# Patient Record
Sex: MALE | Race: WHITE | HISPANIC OR LATINO | Employment: OTHER | URBAN - METROPOLITAN AREA
[De-identification: names, ages, dates, MRNs, and addresses within clinical notes are randomized per-mention and may not be internally consistent; named-entity substitution may affect disease eponyms.]

---

## 2018-04-24 LAB
APTT PPP: 29 SEC (ref 23–31)
PT - I.N. RATIO (HISTORICAL): 1.1 RATIO(INR)
PT, PATIENT (HISTORICAL): 10.8 SEC (ref 9.2–11.1)

## 2018-04-30 LAB
ABSOL LYMPHOCYTES (HISTORICAL): 2.3 K/UL (ref 0.5–4)
ALBUMIN SERPL BCP-MCNC: 3.8 G/DL (ref 3–5.2)
ALP SERPL-CCNC: 46 U/L (ref 43–122)
ALT SERPL W P-5'-P-CCNC: 37 U/L (ref 9–52)
ANION GAP SERPL CALCULATED.3IONS-SCNC: 10 MMOL/L (ref 5–14)
AST SERPL W P-5'-P-CCNC: 15 U/L (ref 17–59)
BASOPHILS # BLD AUTO: 0.1 K/UL (ref 0–0.1)
BASOPHILS # BLD AUTO: 1 % (ref 0–1)
BILIRUB SERPL-MCNC: 0.4 MG/DL
BUN SERPL-MCNC: 15 MG/DL (ref 5–25)
CALCIUM SERPL-MCNC: 9.5 MG/DL (ref 8.4–10.2)
CHLORIDE SERPL-SCNC: 104 MEQ/L (ref 97–108)
CO2 SERPL-SCNC: 31 MMOL/L (ref 22–30)
COMMENT (HISTORICAL): ABNORMAL
CREATINE, SERUM (HISTORICAL): 0.83 MG/DL (ref 0.7–1.5)
DEPRECATED RDW RBC AUTO: 15.8 %
EGFR (HISTORICAL): >60 ML/MIN/1.73 M2
EOSINOPHIL # BLD AUTO: 0.3 K/UL (ref 0–0.4)
EOSINOPHIL NFR BLD AUTO: 4 % (ref 0–6)
FERRITIN SERPL-MCNC: 37 NG/ML (ref 18–464)
GLUCOSE FASTING (HISTORICAL): 85 MG/DL (ref 70–99)
HCT VFR BLD AUTO: 37.2 % (ref 41–53)
HGB BLD-MCNC: 12 G/DL (ref 13.5–17.5)
IRON SERPL-MCNC: 48 UG/DL (ref 49–181)
LYMPHOCYTES NFR BLD AUTO: 34 % (ref 25–45)
MCH RBC QN AUTO: 25.3 PG (ref 26–34)
MCHC RBC AUTO-ENTMCNC: 32.4 % (ref 31–36)
MCV RBC AUTO: 78 FL (ref 80–100)
MONOCYTES # BLD AUTO: 0.5 K/UL (ref 0.2–0.9)
MONOCYTES NFR BLD AUTO: 8 % (ref 1–10)
NEUTROPHILS ABS COUNT (HISTORICAL): 3.6 K/UL (ref 1.8–7.8)
NEUTS SEG NFR BLD AUTO: 53 % (ref 45–65)
PERCENT SATURATION (HISTORICAL): 14 % (ref 11–46)
PLATELET # BLD AUTO: 264 K/MCL (ref 150–450)
POTASSIUM SERPL-SCNC: 4.5 MEQ/L (ref 3.6–5)
RBC # BLD AUTO: 4.77 M/MCL (ref 4.5–5.9)
RBC MORPHOLOGY (HISTORICAL): ABNORMAL
SODIUM SERPL-SCNC: 144 MEQ/L (ref 137–147)
TIBC SERPL-MCNC: 332 UG/DL (ref 261–462)
TOTAL PROTEIN (HISTORICAL): 6.4 G/DL (ref 5.9–8.4)
VIT B12 SERPL-MCNC: 366 PG/ML (ref 239–931)
WBC # BLD AUTO: 6.8 K/MCL (ref 4.5–11)

## 2018-05-14 LAB
ABSOL LYMPHOCYTES (HISTORICAL): 2 K/UL (ref 0.5–4)
ALBUMIN SERPL BCP-MCNC: 4 G/DL (ref 3–5.2)
ALP SERPL-CCNC: 55 U/L (ref 43–122)
ALT SERPL W P-5'-P-CCNC: 31 U/L (ref 9–52)
ANION GAP SERPL CALCULATED.3IONS-SCNC: 9 MMOL/L (ref 5–14)
AST SERPL W P-5'-P-CCNC: 13 U/L (ref 17–59)
BASOPHILS # BLD AUTO: 0.1 K/UL (ref 0–0.1)
BASOPHILS # BLD AUTO: 1 % (ref 0–1)
BILIRUB SERPL-MCNC: 0.5 MG/DL
BUN SERPL-MCNC: 13 MG/DL (ref 5–25)
CALCIUM SERPL-MCNC: 9.2 MG/DL (ref 8.4–10.2)
CHLORIDE SERPL-SCNC: 102 MEQ/L (ref 97–108)
CO2 SERPL-SCNC: 29 MMOL/L (ref 22–30)
COMMENT (HISTORICAL): ABNORMAL
CREATINE, SERUM (HISTORICAL): 0.81 MG/DL (ref 0.7–1.5)
DEPRECATED RDW RBC AUTO: 15.6 %
EGFR (HISTORICAL): >60 ML/MIN/1.73 M2
EOSINOPHIL # BLD AUTO: 0.2 K/UL (ref 0–0.4)
EOSINOPHIL NFR BLD AUTO: 3 % (ref 0–6)
GLUCOSE SERPL-MCNC: 97 MG/DL (ref 70–99)
HCT VFR BLD AUTO: 38.7 % (ref 41–53)
HGB BLD-MCNC: 12.3 G/DL (ref 13.5–17.5)
LYMPHOCYTES NFR BLD AUTO: 35 % (ref 25–45)
MAGNESIUM SERPL-MCNC: 2.2 MG/DL (ref 1.6–2.3)
MCH RBC QN AUTO: 24.9 PG (ref 26–34)
MCHC RBC AUTO-ENTMCNC: 31.9 % (ref 31–36)
MCV RBC AUTO: 78 FL (ref 80–100)
MONOCYTES # BLD AUTO: 0.6 K/UL (ref 0.2–0.9)
MONOCYTES NFR BLD AUTO: 10 % (ref 1–10)
NEUTROPHILS ABS COUNT (HISTORICAL): 2.8 K/UL (ref 1.8–7.8)
NEUTS SEG NFR BLD AUTO: 51 % (ref 45–65)
PLATELET # BLD AUTO: 340 K/MCL (ref 150–450)
POTASSIUM SERPL-SCNC: 4.1 MEQ/L (ref 3.6–5)
RBC # BLD AUTO: 4.95 M/MCL (ref 4.5–5.9)
RBC MORPHOLOGY (HISTORICAL): ABNORMAL
SODIUM SERPL-SCNC: 140 MEQ/L (ref 137–147)
TOTAL PROTEIN (HISTORICAL): 6.5 G/DL (ref 5.9–8.4)
WBC # BLD AUTO: 5.7 K/MCL (ref 4.5–11)

## 2018-05-29 LAB
ABSOL LYMPHOCYTES (HISTORICAL): 2.2 K/UL (ref 0.5–4)
ALBUMIN SERPL BCP-MCNC: 3.6 G/DL (ref 3–5.2)
ALP SERPL-CCNC: 53 U/L (ref 43–122)
ALT SERPL W P-5'-P-CCNC: 32 U/L (ref 9–52)
ANION GAP SERPL CALCULATED.3IONS-SCNC: 10 MMOL/L (ref 5–14)
AST SERPL W P-5'-P-CCNC: 12 U/L (ref 17–59)
BASOPHILS # BLD AUTO: 0 K/UL (ref 0–0.1)
BASOPHILS # BLD AUTO: 1 % (ref 0–1)
BILIRUB SERPL-MCNC: 0.2 MG/DL
BUN SERPL-MCNC: 19 MG/DL (ref 5–25)
CALCIUM SERPL-MCNC: 8.7 MG/DL (ref 8.4–10.2)
CHLORIDE SERPL-SCNC: 108 MEQ/L (ref 97–108)
CO2 SERPL-SCNC: 24 MMOL/L (ref 22–30)
CREATINE, SERUM (HISTORICAL): 0.8 MG/DL (ref 0.7–1.5)
DEPRECATED RDW RBC AUTO: 16.1 %
EGFR (HISTORICAL): >60 ML/MIN/1.73 M2
EOSINOPHIL # BLD AUTO: 0.1 K/UL (ref 0–0.4)
EOSINOPHIL NFR BLD AUTO: 2 % (ref 0–6)
GLUCOSE SERPL-MCNC: 100 MG/DL (ref 70–99)
HCT VFR BLD AUTO: 37.8 % (ref 41–53)
HGB BLD-MCNC: 12 G/DL (ref 13.5–17.5)
LYMPHOCYTES NFR BLD AUTO: 39 % (ref 25–45)
MAGNESIUM SERPL-MCNC: 1.9 MG/DL (ref 1.6–2.3)
MCH RBC QN AUTO: 25.4 PG (ref 26–34)
MCHC RBC AUTO-ENTMCNC: 31.8 % (ref 31–36)
MCV RBC AUTO: 80 FL (ref 80–100)
MONOCYTES # BLD AUTO: 0.5 K/UL (ref 0.2–0.9)
MONOCYTES NFR BLD AUTO: 9 % (ref 1–10)
NEUTROPHILS ABS COUNT (HISTORICAL): 2.8 K/UL (ref 1.8–7.8)
NEUTS SEG NFR BLD AUTO: 49 % (ref 45–65)
PLATELET # BLD AUTO: 291 K/MCL (ref 150–450)
POTASSIUM SERPL-SCNC: 3.9 MEQ/L (ref 3.6–5)
RBC # BLD AUTO: 4.72 M/MCL (ref 4.5–5.9)
SODIUM SERPL-SCNC: 142 MEQ/L (ref 137–147)
TOTAL PROTEIN (HISTORICAL): 5.9 G/DL (ref 5.9–8.4)
WBC # BLD AUTO: 5.6 K/MCL (ref 4.5–11)

## 2018-06-11 LAB
ABSOL LYMPHOCYTES (HISTORICAL): 2.4 K/UL (ref 0.5–4)
ALBUMIN SERPL BCP-MCNC: 4.1 G/DL (ref 3–5.2)
ALP SERPL-CCNC: 62 U/L (ref 43–122)
ALT SERPL W P-5'-P-CCNC: 31 U/L (ref 9–52)
ANION GAP SERPL CALCULATED.3IONS-SCNC: 12 MMOL/L (ref 5–14)
AST SERPL W P-5'-P-CCNC: 17 U/L (ref 17–59)
BASOPHILS # BLD AUTO: 0.1 K/UL (ref 0–0.1)
BASOPHILS # BLD AUTO: 1 % (ref 0–1)
BILIRUB SERPL-MCNC: 0.3 MG/DL
BUN SERPL-MCNC: 16 MG/DL (ref 5–25)
CALCIUM SERPL-MCNC: 9.3 MG/DL (ref 8.4–10.2)
CHLORIDE SERPL-SCNC: 103 MEQ/L (ref 97–108)
CO2 SERPL-SCNC: 27 MMOL/L (ref 22–30)
COMMENT (HISTORICAL): ABNORMAL
CREATINE, SERUM (HISTORICAL): 0.86 MG/DL (ref 0.7–1.5)
DEPRECATED RDW RBC AUTO: 16.2 %
EGFR (HISTORICAL): >60 ML/MIN/1.73 M2
EOSINOPHIL # BLD AUTO: 0.1 K/UL (ref 0–0.4)
EOSINOPHIL NFR BLD AUTO: 1 % (ref 0–6)
GLUCOSE SERPL-MCNC: 101 MG/DL (ref 70–99)
HCT VFR BLD AUTO: 40.1 % (ref 41–53)
HGB BLD-MCNC: 13.1 G/DL (ref 13.5–17.5)
LYMPHOCYTES NFR BLD AUTO: 43 % (ref 25–45)
MAGNESIUM SERPL-MCNC: 2 MG/DL (ref 1.6–2.3)
MCH RBC QN AUTO: 25.6 PG (ref 26–34)
MCHC RBC AUTO-ENTMCNC: 32.5 % (ref 31–36)
MCV RBC AUTO: 79 FL (ref 80–100)
MONOCYTES # BLD AUTO: 0.6 K/UL (ref 0.2–0.9)
MONOCYTES NFR BLD AUTO: 10 % (ref 1–10)
NEUTROPHILS ABS COUNT (HISTORICAL): 2.4 K/UL (ref 1.8–7.8)
NEUTS SEG NFR BLD AUTO: 45 % (ref 45–65)
PLATELET # BLD AUTO: 295 K/MCL (ref 150–450)
POTASSIUM SERPL-SCNC: 4.5 MEQ/L (ref 3.6–5)
RBC # BLD AUTO: 5.09 M/MCL (ref 4.5–5.9)
RBC MORPHOLOGY (HISTORICAL): ABNORMAL
SODIUM SERPL-SCNC: 141 MEQ/L (ref 137–147)
TOTAL PROTEIN (HISTORICAL): 7.1 G/DL (ref 5.9–8.4)
WBC # BLD AUTO: 5.6 K/MCL (ref 4.5–11)

## 2018-06-25 ENCOUNTER — HOSPITAL ENCOUNTER (OUTPATIENT)
Dept: INFUSION CENTER | Facility: HOSPITAL | Age: 63
Discharge: HOME | End: 2018-06-25
Payer: COMMERCIAL

## 2018-06-25 PROBLEM — C19 RECTOSIGMOID JUNCTION CARCINOMA (HCC): Chronic | Status: ACTIVE | Noted: 2018-06-25

## 2018-06-25 LAB
ALBUMIN SERPL BCP-MCNC: 3.8 G/DL (ref 3–5.2)
ALP SERPL-CCNC: 55 U/L (ref 43–122)
ALT SERPL W P-5'-P-CCNC: 29 U/L (ref 9–52)
ANION GAP SERPL CALCULATED.3IONS-SCNC: 9 MMOL/L (ref 5–14)
AST SERPL W P-5'-P-CCNC: 24 U/L (ref 17–59)
BASOPHILS # BLD AUTO: 0 THOUSANDS/ΜL (ref 0–0.1)
BASOPHILS NFR BLD AUTO: 1 % (ref 0–1)
BILIRUB SERPL-MCNC: 0.4 MG/DL
BUN SERPL-MCNC: 13 MG/DL (ref 5–25)
CALCIUM SERPL-MCNC: 8.7 MG/DL (ref 8.4–10.2)
CHLORIDE SERPL-SCNC: 106 MMOL/L (ref 97–108)
CO2 SERPL-SCNC: 27 MMOL/L (ref 22–30)
CREAT SERPL-MCNC: 0.79 MG/DL (ref 0.7–1.5)
EOSINOPHIL # BLD AUTO: 0.1 THOUSAND/ΜL (ref 0–0.4)
EOSINOPHIL NFR BLD AUTO: 2 % (ref 0–6)
ERYTHROCYTE [DISTWIDTH] IN BLOOD BY AUTOMATED COUNT: 16.3 %
GFR SERPL CREATININE-BSD FRML MDRD: 111 ML/MIN/1.73SQ M
GLUCOSE SERPL-MCNC: 139 MG/DL (ref 70–99)
HCT VFR BLD AUTO: 38.9 % (ref 41–53)
HGB BLD-MCNC: 12.4 G/DL (ref 13.5–17.5)
LYMPHOCYTES # BLD AUTO: 1.7 THOUSANDS/ΜL (ref 0.5–4)
LYMPHOCYTES NFR BLD AUTO: 39 % (ref 20–50)
MAGNESIUM SERPL-MCNC: 2.1 MG/DL (ref 1.6–2.3)
MCH RBC QN AUTO: 25.3 PG (ref 26–34)
MCHC RBC AUTO-ENTMCNC: 31.9 G/DL (ref 31–36)
MCV RBC AUTO: 79 FL (ref 80–100)
MONOCYTES # BLD AUTO: 0.4 THOUSAND/ΜL (ref 0.2–0.9)
MONOCYTES NFR BLD AUTO: 9 % (ref 1–10)
NEUTROPHILS # BLD AUTO: 2.1 THOUSANDS/ΜL (ref 1.8–7.8)
NEUTS SEG NFR BLD AUTO: 49 % (ref 45–65)
PLATELET # BLD AUTO: 244 THOUSANDS/UL (ref 150–450)
PLATELET BLD QL SMEAR: ADEQUATE
PMV BLD AUTO: 7.7 FL (ref 8.9–12.7)
POTASSIUM SERPL-SCNC: 3.8 MMOL/L (ref 3.6–5)
PROT SERPL-MCNC: 6.7 G/DL (ref 5.9–8.4)
RBC # BLD AUTO: 4.9 MILLION/UL (ref 4.5–5.9)
RBC MORPH BLD: NORMAL
SODIUM SERPL-SCNC: 142 MMOL/L (ref 137–147)
WBC # BLD AUTO: 4.2 THOUSAND/UL (ref 4.5–11)

## 2018-06-25 PROCEDURE — 80053 COMPREHEN METABOLIC PANEL: CPT | Performed by: INTERNAL MEDICINE

## 2018-06-25 PROCEDURE — 83735 ASSAY OF MAGNESIUM: CPT | Performed by: INTERNAL MEDICINE

## 2018-06-25 PROCEDURE — 85025 COMPLETE CBC W/AUTO DIFF WBC: CPT | Performed by: INTERNAL MEDICINE

## 2018-06-25 NOTE — PLAN OF CARE
Problem: INFECTION - ADULT  Goal: Absence or prevention of progression during hospitalization  INTERVENTIONS:  - Assess and monitor for signs and symptoms of infection  - Monitor lab/diagnostic results  - Monitor all insertion sites, i e  indwelling lines, tubes, and drains  - Monitor endotracheal (as able) and nasal secretions for changes in amount and color  - [place] appropriate cooling/warming therapies per order  - Administer medications as ordered  - Instruct and encourage patient and family to use good hand hygiene technique  - Identify and instruct in appropriate isolation precautions for identified infection/condition  Outcome: Progressing    Goal: Absence of fever/infection during neutropenic period  INTERVENTIONS:  - Monitor WBC  - Implement neutropenic guidelines  Outcome: Progressing      Problem: Knowledge Deficit  Goal: Patient/family/caregiver demonstrates understanding of disease process, treatment plan, medications, and discharge instructions  Complete learning assessment and assess knowledge base    Interventions:  - Provide teaching at level of understanding  - Provide teaching via preferred learning methods  Outcome: Progressing

## 2018-06-25 NOTE — PROGRESS NOTES
Pt arrived for labs  Port accessed without difficulties aND LABS DRAWN  PTS ENGLISH IS LIMITED  WILL DEFER CLINICAL INTAKE UNTIL TOMORROW PRIOR TO THERAPY

## 2018-06-26 ENCOUNTER — OFFICE VISIT (OUTPATIENT)
Dept: HEMATOLOGY ONCOLOGY | Facility: CLINIC | Age: 63
End: 2018-06-26
Payer: COMMERCIAL

## 2018-06-26 ENCOUNTER — HOSPITAL ENCOUNTER (OUTPATIENT)
Dept: INFUSION CENTER | Facility: HOSPITAL | Age: 63
Discharge: HOME | End: 2018-06-26
Payer: COMMERCIAL

## 2018-06-26 VITALS
HEIGHT: 68 IN | HEART RATE: 95 BPM | WEIGHT: 171.2 LBS | TEMPERATURE: 97.6 F | SYSTOLIC BLOOD PRESSURE: 150 MMHG | DIASTOLIC BLOOD PRESSURE: 90 MMHG | BODY MASS INDEX: 25.94 KG/M2

## 2018-06-26 VITALS
TEMPERATURE: 98.3 F | BODY MASS INDEX: 26.68 KG/M2 | SYSTOLIC BLOOD PRESSURE: 128 MMHG | HEIGHT: 67 IN | HEART RATE: 68 BPM | DIASTOLIC BLOOD PRESSURE: 78 MMHG | RESPIRATION RATE: 16 BRPM | WEIGHT: 170 LBS | OXYGEN SATURATION: 99 %

## 2018-06-26 DIAGNOSIS — C18.9 MALIGNANT NEOPLASM OF COLON, UNSPECIFIED PART OF COLON (HCC): Primary | ICD-10-CM

## 2018-06-26 PROCEDURE — 96415 CHEMO IV INFUSION ADDL HR: CPT

## 2018-06-26 PROCEDURE — 96367 TX/PROPH/DG ADDL SEQ IV INF: CPT

## 2018-06-26 PROCEDURE — 96416 CHEMO PROLONG INFUSE W/PUMP: CPT

## 2018-06-26 PROCEDURE — 96372 THER/PROPH/DIAG INJ SC/IM: CPT

## 2018-06-26 PROCEDURE — 96368 THER/DIAG CONCURRENT INF: CPT

## 2018-06-26 PROCEDURE — 99214 OFFICE O/P EST MOD 30 MIN: CPT | Performed by: INTERNAL MEDICINE

## 2018-06-26 PROCEDURE — 96411 CHEMO IV PUSH ADDL DRUG: CPT

## 2018-06-26 PROCEDURE — 96413 CHEMO IV INFUSION 1 HR: CPT

## 2018-06-26 PROCEDURE — G0498 CHEMO EXTEND IV INFUS W/PUMP: HCPCS

## 2018-06-26 RX ORDER — CYANOCOBALAMIN 1000 UG/ML
1000 INJECTION INTRAMUSCULAR; SUBCUTANEOUS
Status: DISCONTINUED | OUTPATIENT
Start: 2018-06-26 | End: 2018-06-26

## 2018-06-26 RX ORDER — PANTOPRAZOLE SODIUM 40 MG/1
TABLET, DELAYED RELEASE ORAL
Refills: 3 | COMMUNITY
Start: 2018-05-28 | End: 2018-09-10 | Stop reason: SDUPTHER

## 2018-06-26 RX ORDER — CYANOCOBALAMIN 1000 UG/ML
INJECTION INTRAMUSCULAR; SUBCUTANEOUS
Status: DISPENSED
Start: 2018-06-26 | End: 2018-06-27

## 2018-06-26 RX ORDER — FLUOROURACIL 50 MG/ML
768 INJECTION, SOLUTION INTRAVENOUS ONCE
Status: COMPLETED | OUTPATIENT
Start: 2018-06-26 | End: 2018-06-26

## 2018-06-26 RX ORDER — ARIPIPRAZOLE 20 MG/1
TABLET ORAL
Refills: 2 | COMMUNITY
Start: 2018-06-15

## 2018-06-26 RX ORDER — BENZTROPINE MESYLATE 0.5 MG/1
TABLET ORAL EVERY 24 HOURS
COMMUNITY

## 2018-06-26 RX ORDER — CLONAZEPAM 2 MG/1
TABLET ORAL
Refills: 2 | COMMUNITY
Start: 2018-04-21

## 2018-06-26 RX ORDER — ATORVASTATIN CALCIUM 20 MG/1
20 TABLET, FILM COATED ORAL
COMMUNITY
End: 2018-08-31

## 2018-06-26 RX ORDER — LIDOCAINE AND PRILOCAINE 25; 25 MG/G; MG/G
CREAM TOPICAL
Refills: 3 | COMMUNITY
Start: 2018-04-26

## 2018-06-26 RX ORDER — HEPARIN SODIUM (PORCINE) LOCK FLUSH IV SOLN 100 UNIT/ML 100 UNIT/ML
500 SOLUTION INTRAVENOUS AS NEEDED
Status: DISCONTINUED | OUTPATIENT
Start: 2018-06-26 | End: 2018-06-30 | Stop reason: HOSPADM

## 2018-06-26 RX ORDER — CYANOCOBALAMIN 1000 UG/ML
1000 INJECTION INTRAMUSCULAR; SUBCUTANEOUS ONCE
Status: COMPLETED | OUTPATIENT
Start: 2018-06-26 | End: 2018-06-26

## 2018-06-26 RX ORDER — ASPIRIN 81 MG/1
TABLET ORAL
Refills: 4 | COMMUNITY
Start: 2018-05-25 | End: 2019-03-21 | Stop reason: SDUPTHER

## 2018-06-26 RX ORDER — POLYVINYL ALCOHOL 14 MG/ML
SOLUTION/ DROPS OPHTHALMIC
Refills: 0 | COMMUNITY
Start: 2018-05-18 | End: 2021-03-23 | Stop reason: SDUPTHER

## 2018-06-26 RX ORDER — LISINOPRIL 40 MG/1
TABLET ORAL
Refills: 2 | COMMUNITY
Start: 2018-05-28 | End: 2018-07-23 | Stop reason: SDUPTHER

## 2018-06-26 RX ORDER — ONDANSETRON HYDROCHLORIDE 8 MG/1
TABLET, FILM COATED ORAL
Refills: 3 | COMMUNITY
Start: 2018-04-26 | End: 2018-08-06 | Stop reason: SDUPTHER

## 2018-06-26 RX ORDER — HYDROCHLOROTHIAZIDE 12.5 MG/1
12.5 CAPSULE, GELATIN COATED ORAL
COMMUNITY
End: 2018-07-23 | Stop reason: SDUPTHER

## 2018-06-26 RX ADMIN — OXALIPLATIN 163 MG: 5 INJECTION, SOLUTION INTRAVENOUS at 12:17

## 2018-06-26 RX ADMIN — LEUCOVORIN CALCIUM 768 MG: 350 INJECTION, POWDER, LYOPHILIZED, FOR SOLUTION INTRAMUSCULAR; INTRAVENOUS at 12:16

## 2018-06-26 RX ADMIN — FLUOROURACIL 768 MG: 50 INJECTION, SOLUTION INTRAVENOUS at 14:28

## 2018-06-26 RX ADMIN — ONDANSETRON HYDROCHLORIDE: 2 INJECTION, SOLUTION INTRAMUSCULAR; INTRAVENOUS at 11:30

## 2018-06-26 RX ADMIN — SODIUM CHLORIDE 150 MG: 9 INJECTION, SOLUTION INTRAVENOUS at 10:54

## 2018-06-26 RX ADMIN — CYANOCOBALAMIN 1000 MCG: 1000 INJECTION INTRAMUSCULAR; SUBCUTANEOUS at 14:39

## 2018-06-26 NOTE — PLAN OF CARE

## 2018-06-26 NOTE — PLAN OF CARE
Problem: INFECTION - ADULT  Goal: Absence of fever/infection during neutropenic period  INTERVENTIONS:  - Monitor WBC  - Implement neutropenic guidelines  Outcome: Progressing      Problem: Knowledge Deficit  Goal: Patient/family/caregiver demonstrates understanding of disease process, treatment plan, medications, and discharge instructions  Complete learning assessment and assess knowledge base    Interventions:  - Provide teaching at level of understanding  - Provide teaching via preferred learning methods  Outcome: Progressing

## 2018-06-26 NOTE — PROGRESS NOTES
Hematology/Oncology Outpatient Follow-up  Zachary Thayer 58 y o  male 1955 [id number]    Date:  6/26/2018      Assessment and Plan:  1  Stage IIIC adenocarcinoma of the colon  We will continue with the adjuvant chemotherapy FOLFOX6 on every other week basis  He is due for cycle 5 of 12 cycles  2   Mild microcytic anemia without iron deficiency  We will continue to monitor his hemoglobin level and treat him with iron IV if necessary  HPI:    Mr lloyd is tolerating the adjuvant chemotherapy very well  He only complains about mild neuropathy and mild difficulties of swallowing for 3 days after chemotherapy  Oncology History    Mr Wendie Xiao was diagnosed with rectosigmoid adenocarcinoma in March of 2018  He was in his usual state of health until he started to notice blood per rectum with each bowel movement for approximately 2 months  He had a colonoscopy February 27th 2018 which revealed a rectosigmoid junction mass  Biopsy revealed moderately differentiated adenocarcinoma  Staging CT of the chest abdomen and pelvis on March 5, 2018 showed no significant hand of any metastatic process in the liver  He did have 3-5 mm round ground-glass opacities within the periphery periphery of the right upper lobe of the lung, 3-6 month follow-up was recommended  CT also revealed focal narrowing of the mid sigmoid colon with associated wall thickening and mild prostatic hypertrophy and posterior bladder diverticuli  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[address] )    6/25/2018 Initial Diagnosis     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[address] )        Chemotherapy     1  Started FOLFOX6 cycle #1 May 1, 2018  Surgery     1  March 28, 2018 low anterior resection of the rectosigmoid sigmoid junction mass by Dr Marcia Hwang  Stage IIIc (T4, N2a, M0)            Interval history:    ROS: Review of Systems   Constitutional: Negative for appetite change, diaphoresis, fatigue and fever     HENT: Negative for congestion, dental problem, facial swelling, hearing loss, tinnitus and trouble swallowing  Eyes: Negative for visual disturbance  Respiratory: Negative for cough, chest tightness, shortness of breath and wheezing  Cardiovascular: Negative for chest pain and leg swelling  Gastrointestinal: Negative for abdominal distention, abdominal pain, blood in stool, constipation, diarrhea, nausea and vomiting  Genitourinary: Negative for dysuria, hematuria and urgency  Musculoskeletal: Negative for arthralgias, myalgias and neck pain  Skin: Negative  Negative for color change, pallor, rash and wound  Neurological: Positive for numbness (mild)  Negative for dizziness, weakness and headaches  Hematological: Negative for adenopathy  Psychiatric/Behavioral: Negative for agitation, behavioral problems, confusion, hallucinations, self-injury and sleep disturbance  The patient is not nervous/anxious and is not hyperactive  Past Medical History:   Diagnosis Date    [facility]     Depression     Hypertension        History reviewed  No pertinent surgical history      Social History     Social History    Marital status: Single     Spouse name: N/A    Number of children: N/A    Years of education: N/A     Social History Main Topics    Smoking status: Former Smoker    Smokeless tobacco: Never Used      Comment: quit 20 years ago     Alcohol use 0 6 oz/week     1 Cans of beer per week    Drug use: No    Sexual activity: Not Asked     Other Topics Concern    None     Social History Narrative    None       Family History   Problem Relation Age of Onset    Family history unknown: Yes       No Known Allergies      Current Outpatient Prescriptions:     ARIPiprazole (ABILIFY) 20 MG tablet, TK 1 T PO QD , Disp: , Rfl: 2    ARTIFICIAL TEARS 1 4 % ophthalmic solution, USE 1 DROP IN BOTH EYES 3-4 TIMES PER DAY AS NEEDED, Disp: , Rfl: 0    aspirin (ECOTRIN LOW STRENGTH) 81 mg EC tablet, TK 1 T PO QD, Disp: , Rfl: 4    atorvastatin (LIPITOR) 20 mg tablet, Take 20 mg by mouth, Disp: , Rfl:     benztropine (COGENTIN) 0 5 mg tablet, every 24 hours, Disp: , Rfl:     clonazePAM (KlonoPIN) 2 mg tablet, TK 1 T PO HS PRN , Disp: , Rfl: 2    enoxaparin (LOVENOX) 40 mg/0 4 mL, INJECT 0 4 ML UNDER THE SKIN DAILY FOR 21 DAYS, Disp: , Rfl: 0    hydrochlorothiazide (MICROZIDE) 12 5 mg capsule, Take 12 5 mg by mouth, Disp: , Rfl:     lidocaine-prilocaine (EMLA) cream, , Disp: , Rfl: 3    lisinopril (ZESTRIL) 40 mg tablet, TK 1 T PO QD, Disp: , Rfl: 2    ondansetron (ZOFRAN) 8 mg tablet, TK 1 T PO  Q 8 H PRF NAUSEA AND VOMITING, Disp: , Rfl: 3    pantoprazole (PROTONIX) 40 mg tablet, TK 1 T PO D, Disp: , Rfl: 3    Propylene Glycol-Glycerin 1-0 3 % SOLN, Use 3-4 times per day PRN, Disp: , Rfl:       Physical Exam:  /90 (BP Location: Left arm, Patient Position: Sitting, Cuff Size: Adult)   Pulse 95   Temp 97 6 °F (36 4 °C) (Tympanic)   Ht 5' 8" (1 727 m)   Wt 77 7 kg (171 lb 3 2 oz)   BMI 26 03 kg/m²     Physical Exam   Constitutional: He is oriented to person, place, and time  He appears well-developed and well-nourished  HENT:   Head: Normocephalic and atraumatic  Nose: Nose normal    Mouth/Throat: Oropharynx is clear and moist    Eyes: Conjunctivae and EOM are normal  Pupils are equal, round, and reactive to light  Right eye exhibits no discharge  Left eye exhibits no discharge  No scleral icterus  Neck: Normal range of motion  Neck supple  No tracheal deviation present  No thyromegaly present  Cardiovascular: Normal rate, regular rhythm and normal heart sounds  Exam reveals no friction rub  No murmur heard  Pulmonary/Chest: Effort normal and breath sounds normal  No respiratory distress  He has no wheezes  He has no rales  He exhibits no tenderness  Abdominal: Soft  Bowel sounds are normal  He exhibits no distension and no mass  There is no hepatosplenomegaly, splenomegaly or hepatomegaly   There is no tenderness  There is no rebound and no guarding  Musculoskeletal: Normal range of motion  He exhibits no edema, tenderness or deformity  Lymphadenopathy:     He has no cervical adenopathy  Neurological: He is alert and oriented to person, place, and time  He has normal reflexes  No cranial nerve deficit  Coordination normal    Skin: Skin is warm and dry  No rash noted  No erythema  No pallor  Psychiatric: He has a normal mood and affect  His behavior is normal  Judgment and thought content normal          Labs:  Lab Results   Component Value Date    WBC 4 20 (L) 06/25/2018    HGB 12 4 (L) 06/25/2018    HCT 38 9 (L) 06/25/2018    MCV 79 (L) 06/25/2018     06/25/2018     Lab Results   Component Value Date     06/25/2018    K 3 8 06/25/2018     06/25/2018    CO2 27 06/25/2018    ANIONGAP 9 06/25/2018    BUN 13 06/25/2018    CREATININE 0 79 06/25/2018    GLUCOSE 139 (H) 06/25/2018    GLUF 85 04/30/2018    CALCIUM 8 7 06/25/2018    AST 24 06/25/2018    ALT 29 06/25/2018    ALKPHOS 55 06/25/2018    PROT 6 7 06/25/2018    BILITOT 0 40 06/25/2018    EGFR 111 06/25/2018     @RESULTFAST(TSH)@    Patient voiced understanding and agreement in the above discussion  Aware to contact our office with questions/symptoms in the interim

## 2018-06-28 ENCOUNTER — HOSPITAL ENCOUNTER (OUTPATIENT)
Dept: INFUSION CENTER | Facility: HOSPITAL | Age: 63
Discharge: HOME | End: 2018-06-28
Payer: COMMERCIAL

## 2018-06-28 RX ADMIN — Medication 500 UNITS: at 12:58

## 2018-06-28 NOTE — PROGRESS NOTES
Pt arrived for CADD d/c  Res vol=0  Good blood return noted, flushed per protocol   Offers no complaints

## 2018-07-09 ENCOUNTER — HOSPITAL ENCOUNTER (OUTPATIENT)
Dept: INFUSION CENTER | Facility: HOSPITAL | Age: 63
Discharge: HOME | End: 2018-07-09
Payer: COMMERCIAL

## 2018-07-09 ENCOUNTER — OFFICE VISIT (OUTPATIENT)
Dept: HEMATOLOGY ONCOLOGY | Facility: CLINIC | Age: 63
End: 2018-07-09
Payer: COMMERCIAL

## 2018-07-09 VITALS
HEART RATE: 74 BPM | BODY MASS INDEX: 25.67 KG/M2 | SYSTOLIC BLOOD PRESSURE: 138 MMHG | HEIGHT: 68 IN | RESPIRATION RATE: 18 BRPM | DIASTOLIC BLOOD PRESSURE: 88 MMHG | TEMPERATURE: 97.1 F | OXYGEN SATURATION: 99 % | WEIGHT: 169.4 LBS

## 2018-07-09 DIAGNOSIS — D50.9 IRON DEFICIENCY ANEMIA, UNSPECIFIED IRON DEFICIENCY ANEMIA TYPE: ICD-10-CM

## 2018-07-09 DIAGNOSIS — C19 RECTOSIGMOID JUNCTION CARCINOMA (HCC): ICD-10-CM

## 2018-07-09 DIAGNOSIS — C19 RECTOSIGMOID JUNCTION CARCINOMA (HCC): Primary | Chronic | ICD-10-CM

## 2018-07-09 LAB
ALBUMIN SERPL BCP-MCNC: 3.9 G/DL (ref 3–5.2)
ALP SERPL-CCNC: 68 U/L (ref 43–122)
ALT SERPL W P-5'-P-CCNC: 36 U/L (ref 9–52)
ANION GAP SERPL CALCULATED.3IONS-SCNC: 7 MMOL/L (ref 5–14)
AST SERPL W P-5'-P-CCNC: 29 U/L (ref 17–59)
BASOPHILS # BLD AUTO: 0 THOUSANDS/ΜL (ref 0–0.1)
BASOPHILS NFR BLD AUTO: 1 % (ref 0–1)
BILIRUB SERPL-MCNC: 0.4 MG/DL
BUN SERPL-MCNC: 12 MG/DL (ref 5–25)
CALCIUM SERPL-MCNC: 8.9 MG/DL (ref 8.4–10.2)
CHLORIDE SERPL-SCNC: 104 MMOL/L (ref 97–108)
CO2 SERPL-SCNC: 27 MMOL/L (ref 22–30)
CREAT SERPL-MCNC: 0.79 MG/DL (ref 0.7–1.5)
EOSINOPHIL # BLD AUTO: 0.1 THOUSAND/ΜL (ref 0–0.4)
EOSINOPHIL NFR BLD AUTO: 1 % (ref 0–6)
ERYTHROCYTE [DISTWIDTH] IN BLOOD BY AUTOMATED COUNT: 18 %
GFR SERPL CREATININE-BSD FRML MDRD: 111 ML/MIN/1.73SQ M
GLUCOSE SERPL-MCNC: 106 MG/DL (ref 70–99)
HCT VFR BLD AUTO: 39.4 % (ref 41–53)
HGB BLD-MCNC: 12.9 G/DL (ref 13.5–17.5)
HYPERCHROMIA BLD QL SMEAR: PRESENT
LYMPHOCYTES # BLD AUTO: 1.6 THOUSANDS/ΜL (ref 0.5–4)
LYMPHOCYTES NFR BLD AUTO: 32 % (ref 20–50)
MAGNESIUM SERPL-MCNC: 2.2 MG/DL (ref 1.6–2.3)
MCH RBC QN AUTO: 26 PG (ref 26–34)
MCHC RBC AUTO-ENTMCNC: 32.8 G/DL (ref 31–36)
MCV RBC AUTO: 79 FL (ref 80–100)
MONOCYTES # BLD AUTO: 0.5 THOUSAND/ΜL (ref 0.2–0.9)
MONOCYTES NFR BLD AUTO: 10 % (ref 1–10)
NEUTROPHILS # BLD AUTO: 2.8 THOUSANDS/ΜL (ref 1.8–7.8)
NEUTS SEG NFR BLD AUTO: 56 % (ref 45–65)
PLATELET # BLD AUTO: 278 THOUSANDS/UL (ref 150–450)
PLATELET BLD QL SMEAR: ADEQUATE
PMV BLD AUTO: 7.8 FL (ref 8.9–12.7)
POTASSIUM SERPL-SCNC: 4 MMOL/L (ref 3.6–5)
PROT SERPL-MCNC: 7 G/DL (ref 5.9–8.4)
RBC # BLD AUTO: 4.97 MILLION/UL (ref 4.5–5.9)
RBC MORPH BLD: PRESENT
SODIUM SERPL-SCNC: 138 MMOL/L (ref 137–147)
WBC # BLD AUTO: 5.1 THOUSAND/UL (ref 4.5–11)

## 2018-07-09 PROCEDURE — 80053 COMPREHEN METABOLIC PANEL: CPT

## 2018-07-09 PROCEDURE — 85025 COMPLETE CBC W/AUTO DIFF WBC: CPT

## 2018-07-09 PROCEDURE — 99214 OFFICE O/P EST MOD 30 MIN: CPT | Performed by: INTERNAL MEDICINE

## 2018-07-09 PROCEDURE — 83735 ASSAY OF MAGNESIUM: CPT | Performed by: INTERNAL MEDICINE

## 2018-07-09 RX ORDER — HEPARIN SODIUM (PORCINE) LOCK FLUSH IV SOLN 100 UNIT/ML 100 UNIT/ML
300 SOLUTION INTRAVENOUS AS NEEDED
Status: DISCONTINUED | OUTPATIENT
Start: 2018-07-10 | End: 2018-07-14 | Stop reason: HOSPADM

## 2018-07-09 RX ORDER — FLUOROURACIL 50 MG/ML
768 INJECTION, SOLUTION INTRAVENOUS ONCE
Status: COMPLETED | OUTPATIENT
Start: 2018-07-10 | End: 2018-07-10

## 2018-07-09 RX ORDER — SODIUM CHLORIDE 9 MG/ML
20 INJECTION, SOLUTION INTRAVENOUS CONTINUOUS
Status: DISCONTINUED | OUTPATIENT
Start: 2018-07-10 | End: 2018-07-14 | Stop reason: HOSPADM

## 2018-07-09 NOTE — PROGRESS NOTES
Admitted to infusion center today for lab draw via port  Pac accessed per protocol  Good blood return noted  Flushed and deaccessed per routine

## 2018-07-10 ENCOUNTER — HOSPITAL ENCOUNTER (OUTPATIENT)
Dept: INFUSION CENTER | Facility: HOSPITAL | Age: 63
Discharge: HOME | End: 2018-07-10
Payer: COMMERCIAL

## 2018-07-10 VITALS
SYSTOLIC BLOOD PRESSURE: 128 MMHG | RESPIRATION RATE: 16 BRPM | TEMPERATURE: 98.4 F | DIASTOLIC BLOOD PRESSURE: 74 MMHG | HEART RATE: 93 BPM | BODY MASS INDEX: 26.89 KG/M2 | HEIGHT: 67 IN | WEIGHT: 171.3 LBS

## 2018-07-10 DIAGNOSIS — D50.9 IRON DEFICIENCY ANEMIA, UNSPECIFIED IRON DEFICIENCY ANEMIA TYPE: ICD-10-CM

## 2018-07-10 LAB
FERRITIN SERPL-MCNC: 74 NG/ML (ref 8–388)
IRON SATN MFR SERPL: 24 %
IRON SERPL-MCNC: 98 UG/DL (ref 65–175)
TIBC SERPL-MCNC: 405 UG/DL (ref 250–450)
VIT B12 SERPL-MCNC: 619 PG/ML (ref 100–900)

## 2018-07-10 PROCEDURE — 82607 VITAMIN B-12: CPT

## 2018-07-10 PROCEDURE — 83550 IRON BINDING TEST: CPT

## 2018-07-10 PROCEDURE — 96415 CHEMO IV INFUSION ADDL HR: CPT

## 2018-07-10 PROCEDURE — 96413 CHEMO IV INFUSION 1 HR: CPT

## 2018-07-10 PROCEDURE — 96411 CHEMO IV PUSH ADDL DRUG: CPT

## 2018-07-10 PROCEDURE — 96367 TX/PROPH/DG ADDL SEQ IV INF: CPT

## 2018-07-10 PROCEDURE — 96368 THER/DIAG CONCURRENT INF: CPT

## 2018-07-10 PROCEDURE — 83540 ASSAY OF IRON: CPT

## 2018-07-10 PROCEDURE — G0498 CHEMO EXTEND IV INFUS W/PUMP: HCPCS

## 2018-07-10 PROCEDURE — 82728 ASSAY OF FERRITIN: CPT

## 2018-07-10 RX ORDER — DEXTROSE MONOHYDRATE 50 MG/ML
20 INJECTION, SOLUTION INTRAVENOUS CONTINUOUS
Status: DISCONTINUED | OUTPATIENT
Start: 2018-07-10 | End: 2018-07-14 | Stop reason: HOSPADM

## 2018-07-10 RX ADMIN — SODIUM CHLORIDE 20 ML/HR: 9 INJECTION, SOLUTION INTRAVENOUS at 10:33

## 2018-07-10 RX ADMIN — LEUCOVORIN CALCIUM 768 MG: 350 INJECTION, POWDER, LYOPHILIZED, FOR SOLUTION INTRAMUSCULAR; INTRAVENOUS at 12:19

## 2018-07-10 RX ADMIN — OXALIPLATIN 163 MG: 5 INJECTION, SOLUTION INTRAVENOUS at 12:25

## 2018-07-10 RX ADMIN — SODIUM CHLORIDE 150 MG: 9 INJECTION, SOLUTION INTRAVENOUS at 11:40

## 2018-07-10 RX ADMIN — DEXTROSE MONOHYDRATE 20 ML/HR: 50 INJECTION, SOLUTION INTRAVENOUS at 12:16

## 2018-07-10 RX ADMIN — FLUOROURACIL 768 MG: 50 INJECTION, SOLUTION INTRAVENOUS at 14:50

## 2018-07-10 RX ADMIN — ONDANSETRON HYDROCHLORIDE: 2 INJECTION, SOLUTION INTRAMUSCULAR; INTRAVENOUS at 11:14

## 2018-07-10 NOTE — PROGRESS NOTES
Patient offers no complaints today  Tolerated chemo well without complications  CADD pump connected at end and patient aware to return [address] 7-12-18 at 1300  AVS provided in [place] and [facility] [place]

## 2018-07-10 NOTE — PLAN OF CARE
Problem: Potential for Falls  Goal: Patient will remain free of falls  INTERVENTIONS:  - Assess patient frequently for physical needs  -  Identify cognitive and physical deficits and behaviors that affect risk of falls    -  [place] fall precautions as indicated by assessment   - Educate patient/family on patient safety including physical limitations  - Instruct patient to call for assistance with activity based on assessment  - Modify environment to reduce risk of injury  - Consider OT/PT consult to assist with strengthening/mobility   Outcome: Progressing

## 2018-07-11 RX ORDER — HEPARIN SODIUM (PORCINE) LOCK FLUSH IV SOLN 100 UNIT/ML 100 UNIT/ML
300 SOLUTION INTRAVENOUS AS NEEDED
Status: DISCONTINUED | OUTPATIENT
Start: 2018-07-12 | End: 2018-07-16 | Stop reason: HOSPADM

## 2018-07-11 RX ORDER — SODIUM CHLORIDE 9 MG/ML
20 INJECTION, SOLUTION INTRAVENOUS CONTINUOUS
Status: DISCONTINUED | OUTPATIENT
Start: 2018-07-12 | End: 2018-07-16 | Stop reason: HOSPADM

## 2018-07-12 ENCOUNTER — HOSPITAL ENCOUNTER (OUTPATIENT)
Dept: INFUSION CENTER | Facility: HOSPITAL | Age: 63
Discharge: HOME | End: 2018-07-12
Payer: COMMERCIAL

## 2018-07-12 RX ADMIN — Medication 300 UNITS: at 13:09

## 2018-07-18 DIAGNOSIS — C19 RECTOSIGMOID JUNCTION CARCINOMA (HCC): Primary | Chronic | ICD-10-CM

## 2018-07-23 ENCOUNTER — OFFICE VISIT (OUTPATIENT)
Dept: HEMATOLOGY ONCOLOGY | Facility: CLINIC | Age: 63
End: 2018-07-23
Payer: COMMERCIAL

## 2018-07-23 ENCOUNTER — HOSPITAL ENCOUNTER (OUTPATIENT)
Dept: INFUSION CENTER | Facility: HOSPITAL | Age: 63
Discharge: HOME | End: 2018-07-23
Payer: COMMERCIAL

## 2018-07-23 ENCOUNTER — TELEPHONE (OUTPATIENT)
Dept: FAMILY MEDICINE CLINIC | Facility: CLINIC | Age: 63
End: 2018-07-23

## 2018-07-23 VITALS
WEIGHT: 169.4 LBS | DIASTOLIC BLOOD PRESSURE: 84 MMHG | OXYGEN SATURATION: 98 % | HEART RATE: 79 BPM | BODY MASS INDEX: 26.59 KG/M2 | RESPIRATION RATE: 17 BRPM | SYSTOLIC BLOOD PRESSURE: 148 MMHG | TEMPERATURE: 97.5 F | HEIGHT: 67 IN

## 2018-07-23 DIAGNOSIS — E78.5 HYPERLIPIDEMIA, UNSPECIFIED HYPERLIPIDEMIA TYPE: ICD-10-CM

## 2018-07-23 DIAGNOSIS — D50.9 IRON DEFICIENCY ANEMIA, UNSPECIFIED IRON DEFICIENCY ANEMIA TYPE: ICD-10-CM

## 2018-07-23 DIAGNOSIS — C19 RECTOSIGMOID JUNCTION CARCINOMA (HCC): Primary | Chronic | ICD-10-CM

## 2018-07-23 DIAGNOSIS — I10 HYPERTENSION, UNSPECIFIED TYPE: ICD-10-CM

## 2018-07-23 DIAGNOSIS — C19 RECTOSIGMOID JUNCTION CARCINOMA (HCC): ICD-10-CM

## 2018-07-23 DIAGNOSIS — I15.9 SECONDARY HYPERTENSION: Primary | ICD-10-CM

## 2018-07-23 DIAGNOSIS — D51.3 OTHER DIETARY VITAMIN B12 DEFICIENCY ANEMIA: ICD-10-CM

## 2018-07-23 LAB
ALBUMIN SERPL BCP-MCNC: 3.7 G/DL (ref 3–5.2)
ALP SERPL-CCNC: 60 U/L (ref 43–122)
ALT SERPL W P-5'-P-CCNC: 75 U/L (ref 9–52)
ANION GAP SERPL CALCULATED.3IONS-SCNC: 6 MMOL/L (ref 5–14)
ANISOCYTOSIS BLD QL SMEAR: PRESENT
AST SERPL W P-5'-P-CCNC: 41 U/L (ref 17–59)
BILIRUB SERPL-MCNC: 0.3 MG/DL
BUN SERPL-MCNC: 10 MG/DL (ref 5–25)
CALCIUM SERPL-MCNC: 8.8 MG/DL (ref 8.4–10.2)
CHLORIDE SERPL-SCNC: 105 MMOL/L (ref 97–108)
CO2 SERPL-SCNC: 30 MMOL/L (ref 22–30)
CREAT SERPL-MCNC: 0.76 MG/DL (ref 0.7–1.5)
EOSINOPHIL # BLD AUTO: 0.09 THOUSAND/UL (ref 0–0.4)
EOSINOPHIL NFR BLD MANUAL: 2 % (ref 0–6)
ERYTHROCYTE [DISTWIDTH] IN BLOOD BY AUTOMATED COUNT: 19.7 %
FERRITIN SERPL-MCNC: 123 NG/ML (ref 8–388)
GFR SERPL CREATININE-BSD FRML MDRD: 113 ML/MIN/1.73SQ M
GLUCOSE SERPL-MCNC: 84 MG/DL (ref 70–99)
HCT VFR BLD AUTO: 36.7 % (ref 41–53)
HGB BLD-MCNC: 11.9 G/DL (ref 13.5–17.5)
IRON SATN MFR SERPL: 9 %
IRON SERPL-MCNC: 34 UG/DL (ref 65–175)
LYMPHOCYTES # BLD AUTO: 1.8 THOUSAND/UL (ref 0.5–4)
LYMPHOCYTES # BLD AUTO: 41 % (ref 20–50)
MAGNESIUM SERPL-MCNC: 2.3 MG/DL (ref 1.6–2.3)
MCH RBC QN AUTO: 25.9 PG (ref 26–34)
MCHC RBC AUTO-ENTMCNC: 32.4 G/DL (ref 31–36)
MCV RBC AUTO: 80 FL (ref 80–100)
MONOCYTES # BLD AUTO: 0.35 THOUSAND/UL (ref 0.2–0.9)
MONOCYTES NFR BLD AUTO: 8 % (ref 1–10)
NEUTS SEG # BLD: 2.16 THOUSAND/UL (ref 1.8–7.8)
NEUTS SEG NFR BLD AUTO: 49 %
OVALOCYTES BLD QL SMEAR: PRESENT
PLATELET # BLD AUTO: 226 THOUSANDS/UL (ref 150–450)
PLATELET BLD QL SMEAR: ADEQUATE
PMV BLD AUTO: 7.8 FL (ref 8.9–12.7)
POTASSIUM SERPL-SCNC: 3.9 MMOL/L (ref 3.6–5)
PROT SERPL-MCNC: 6.8 G/DL (ref 5.9–8.4)
RBC # BLD AUTO: 4.59 MILLION/UL (ref 4.5–5.9)
RBC MORPH BLD: PRESENT
SODIUM SERPL-SCNC: 141 MMOL/L (ref 137–147)
TIBC SERPL-MCNC: 395 UG/DL (ref 250–450)
TOTAL CELLS COUNTED SPEC: 100
VIT B12 SERPL-MCNC: 791 PG/ML (ref 100–900)
WBC # BLD AUTO: 4.4 THOUSAND/UL (ref 4.5–11)

## 2018-07-23 PROCEDURE — 99214 OFFICE O/P EST MOD 30 MIN: CPT | Performed by: INTERNAL MEDICINE

## 2018-07-23 PROCEDURE — 82607 VITAMIN B-12: CPT

## 2018-07-23 PROCEDURE — 83735 ASSAY OF MAGNESIUM: CPT | Performed by: INTERNAL MEDICINE

## 2018-07-23 PROCEDURE — 83550 IRON BINDING TEST: CPT

## 2018-07-23 PROCEDURE — 80053 COMPREHEN METABOLIC PANEL: CPT

## 2018-07-23 PROCEDURE — 85027 COMPLETE CBC AUTOMATED: CPT

## 2018-07-23 PROCEDURE — 83540 ASSAY OF IRON: CPT

## 2018-07-23 PROCEDURE — 82728 ASSAY OF FERRITIN: CPT

## 2018-07-23 PROCEDURE — 85007 BL SMEAR W/DIFF WBC COUNT: CPT

## 2018-07-23 RX ORDER — FLUOROURACIL 50 MG/ML
768 INJECTION, SOLUTION INTRAVENOUS ONCE
Status: COMPLETED | OUTPATIENT
Start: 2018-07-24 | End: 2018-07-24

## 2018-07-23 RX ORDER — HYDROCHLOROTHIAZIDE 12.5 MG/1
12.5 CAPSULE, GELATIN COATED ORAL EVERY MORNING
Qty: 30 CAPSULE | Refills: 2 | Status: SHIPPED | OUTPATIENT
Start: 2018-07-23 | End: 2019-03-21 | Stop reason: SDUPTHER

## 2018-07-23 RX ORDER — DEXTROSE MONOHYDRATE 50 MG/ML
20 INJECTION, SOLUTION INTRAVENOUS CONTINUOUS
Status: DISCONTINUED | OUTPATIENT
Start: 2018-07-24 | End: 2018-07-28 | Stop reason: HOSPADM

## 2018-07-23 RX ORDER — CYANOCOBALAMIN 1000 UG/ML
1000 INJECTION INTRAMUSCULAR; SUBCUTANEOUS
Status: DISCONTINUED | OUTPATIENT
Start: 2018-07-24 | End: 2018-07-28 | Stop reason: HOSPADM

## 2018-07-23 RX ORDER — LISINOPRIL 40 MG/1
40 TABLET ORAL DAILY
Qty: 30 TABLET | Refills: 2 | Status: SHIPPED | OUTPATIENT
Start: 2018-07-23 | End: 2018-11-20 | Stop reason: SDUPTHER

## 2018-07-23 RX ORDER — HEPARIN SODIUM (PORCINE) LOCK FLUSH IV SOLN 100 UNIT/ML 100 UNIT/ML
300 SOLUTION INTRAVENOUS AS NEEDED
Status: DISCONTINUED | OUTPATIENT
Start: 2018-07-24 | End: 2018-07-26

## 2018-07-23 RX ORDER — ATORVASTATIN CALCIUM 20 MG/1
20 TABLET, FILM COATED ORAL DAILY
Qty: 30 TABLET | Refills: 2 | Status: SHIPPED | OUTPATIENT
Start: 2018-07-23 | End: 2019-03-21 | Stop reason: SDUPTHER

## 2018-07-23 NOTE — PROGRESS NOTES
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[address] )  The patient will be continue on the adjuvant chemotherapy with FOLFOX6, he will need 4 more treatments after this cycle  - CBC and differential; Future  - Comprehensive metabolic panel; Future  - Iron Panel; Future  - Vitamin B12; Future  - Magnesium    2  Iron deficiency anemia, unspecified iron deficiency anemia type  We will check his iron panel prior to his next visit  - CBC and differential; Future  - Comprehensive metabolic panel; Future  - Iron Panel; Future    3  Other dietary vitamin B12 deficiency anemia  Will continue with the vitamin B12 injections on every 4 week basis  - CBC and differential; Future  - Comprehensive metabolic panel; Future  - Vitamin B12; Future      HPI:  The patient came in today for follow-up visit  He is entirely asymptomatic  He continues to work full-time  Oncology History    Mr Alexandre Borges was diagnosed with rectosigmoid adenocarcinoma in March of 2018  He was in his usual state of health until he started to notice blood per rectum with each bowel movement for approximately 2 months  He had a colonoscopy February 27th 2018 which revealed a rectosigmoid junction mass  Biopsy revealed moderately differentiated adenocarcinoma  Staging CT of the chest abdomen and pelvis on March 5, 2018 showed no significant hand of any metastatic process in the liver  He did have 3-5 mm round ground-glass opacities within the periphery periphery of the right upper lobe of the lung, 3-6 month follow-up was recommended  CT also revealed focal narrowing of the mid sigmoid colon with associated wall thickening and mild prostatic hypertrophy and posterior bladder diverticuli  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[address] )    3/28/2018 Initial Diagnosis     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[address] )          Chemotherapy     1  Started FOLFOX6 cycle #1 May 1, 2018  Surgery     1  March 28, 2018 low anterior resection of the rectosigmoid sigmoid junction mass by Dr Kelvin Andrade  Stage IIIc (T4, N2a, M0)            Interval history:    ROS: Review of Systems   Constitutional: Negative for appetite change, diaphoresis, fatigue and fever  HENT: Negative for congestion, dental problem, facial swelling, hearing loss, tinnitus and trouble swallowing  Eyes: Negative for visual disturbance  Respiratory: Negative for cough, chest tightness, shortness of breath and wheezing  Cardiovascular: Negative for chest pain and leg swelling  Gastrointestinal: Negative for abdominal distention, abdominal pain, blood in stool, constipation, diarrhea, nausea and vomiting  Genitourinary: Negative for dysuria, hematuria and urgency  Musculoskeletal: Negative for arthralgias, myalgias and neck pain  Skin: Negative  Negative for color change, pallor, rash and wound  Neurological: Negative for dizziness, weakness, numbness and headaches  Hematological: Negative for adenopathy  Psychiatric/Behavioral: Negative for agitation, behavioral problems, confusion, hallucinations, self-injury and sleep disturbance  The patient is not nervous/anxious and is not hyperactive          Past Medical History:   Diagnosis Date    [facility]     Depression     Hypertension     Psychiatric disorder        Past Surgical History:   Procedure Laterality Date    ABDOMINAL SURGERY      PORTACATH PLACEMENT Right        Social History     Social History    Marital status: Single     Spouse name: N/A    Number of children: N/A    Years of education: N/A     Social History Main Topics    Smoking status: Former Smoker    Smokeless tobacco: Never Used      Comment: quit 20 years ago     Alcohol use 1 2 - 1 8 oz/week     2 - 3 Cans of beer per week      Comment: weekly    Drug use: No    Sexual activity: Not Asked     Other Topics Concern    None     Social History Narrative    None       Family History   Problem Relation Age of Onset    No Known Problems Mother     No Known Problems Father        No Known Allergies      Current Outpatient Prescriptions:     ARIPiprazole (ABILIFY) 20 MG tablet, TK 1 T PO QD , Disp: , Rfl: 2    ARTIFICIAL TEARS 1 4 % ophthalmic solution, USE 1 DROP IN BOTH EYES 3-4 TIMES PER DAY AS NEEDED, Disp: , Rfl: 0    aspirin (ECOTRIN LOW STRENGTH) 81 mg EC tablet, TK 1 T PO QD, Disp: , Rfl: 4    atorvastatin (LIPITOR) 20 mg tablet, Take 20 mg by mouth, Disp: , Rfl:     benztropine (COGENTIN) 0 5 mg tablet, every 24 hours, Disp: , Rfl:     clonazePAM (KlonoPIN) 2 mg tablet, TK 1 T PO HS PRN , Disp: , Rfl: 2    enoxaparin (LOVENOX) 40 mg/0 4 mL, INJECT 0 4 ML UNDER THE SKIN DAILY FOR 21 DAYS, Disp: , Rfl: 0    hydrochlorothiazide (MICROZIDE) 12 5 mg capsule, Take 12 5 mg by mouth, Disp: , Rfl:     lidocaine-prilocaine (EMLA) cream, , Disp: , Rfl: 3    lisinopril (ZESTRIL) 40 mg tablet, TK 1 T PO QD, Disp: , Rfl: 2    ondansetron (ZOFRAN) 8 mg tablet, TK 1 T PO  Q 8 H PRF NAUSEA AND VOMITING, Disp: , Rfl: 3    pantoprazole (PROTONIX) 40 mg tablet, TK 1 T PO D, Disp: , Rfl: 3    Propylene Glycol-Glycerin 1-0 3 % SOLN, Use 3-4 times per day PRN, Disp: , Rfl:       Physical Exam:  /84 (BP Location: Left arm, Cuff Size: Adult)   Pulse 79   Temp 97 5 °F (36 4 °C) (Tympanic)   Resp 17   Ht 5' 7 25" (1 708 m)   Wt 76 8 kg (169 lb 6 4 oz)   SpO2 98%   BMI 26 33 kg/m²     Physical Exam   Constitutional: He is oriented to person, place, and time  He appears well-developed and well-nourished  HENT:   Head: Normocephalic and atraumatic  Nose: Nose normal    Mouth/Throat: Oropharynx is clear and moist    Eyes: Conjunctivae and EOM are normal  Pupils are equal, round, and reactive to light  Right eye exhibits no discharge  Left eye exhibits no discharge  No scleral icterus  Neck: Normal range of motion   Neck supple  No tracheal deviation present  No thyromegaly present  Cardiovascular: Normal rate, regular rhythm and normal heart sounds  Exam reveals no friction rub  No murmur heard  Pulmonary/Chest: Effort normal and breath sounds normal  No respiratory distress  He has no wheezes  He has no rales  He exhibits no tenderness  Abdominal: Soft  Bowel sounds are normal  He exhibits no distension and no mass  There is no hepatosplenomegaly, splenomegaly or hepatomegaly  There is no tenderness  There is no rebound and no guarding  Musculoskeletal: Normal range of motion  He exhibits no edema, tenderness or deformity  Lymphadenopathy:     He has no cervical adenopathy  Neurological: He is alert and oriented to person, place, and time  He has normal reflexes  No cranial nerve deficit  Coordination normal    Skin: Skin is warm and dry  No rash noted  No erythema  No pallor  Psychiatric: He has a normal mood and affect  His behavior is normal  Judgment and thought content normal          Labs:  Lab Results   Component Value Date    WBC 5 10 07/09/2018    HGB 12 9 (L) 07/09/2018    HCT 39 4 (L) 07/09/2018    MCV 79 (L) 07/09/2018     07/09/2018     Lab Results   Component Value Date     07/09/2018    K 4 0 07/09/2018     07/09/2018    CO2 27 07/09/2018    ANIONGAP 7 07/09/2018    BUN 12 07/09/2018    CREATININE 0 79 07/09/2018    GLUCOSE 106 (H) 07/09/2018    GLUF 85 04/30/2018    CALCIUM 8 9 07/09/2018    AST 29 07/09/2018    ALT 36 07/09/2018    ALKPHOS 68 07/09/2018    PROT 7 0 07/09/2018    BILITOT 0 40 07/09/2018    EGFR 111 07/09/2018     Lab Results   Component Value Date    IRON 98 07/10/2018    TIBC 405 07/10/2018    FERRITIN 74 07/10/2018     Lab Results   Component Value Date    VUVCSBPY18 619 07/10/2018       Patient voiced understanding and agreement in the above discussion  Aware to contact our office with questions/symptoms in the interim

## 2018-07-23 NOTE — TELEPHONE ENCOUNTER
hydrochlorothiazide (MICROZIDE) 12 5 mg capsule  lisinopril (ZESTRIL) 40 mg tablet    atorvastatin (LIPITOR) 20 mg tablet

## 2018-07-23 NOTE — PROGRESS NOTES
Admitted to infusion center for lab draw via port  Go[place] blood return noted  No recent changes in health  Port flushed and capped for tomorrows use   Discharged with avs

## 2018-07-24 ENCOUNTER — HOSPITAL ENCOUNTER (OUTPATIENT)
Dept: INFUSION CENTER | Facility: HOSPITAL | Age: 63
Discharge: HOME | End: 2018-07-24
Payer: COMMERCIAL

## 2018-07-24 VITALS
HEIGHT: 67 IN | BODY MASS INDEX: 26.26 KG/M2 | HEART RATE: 87 BPM | WEIGHT: 167.33 LBS | DIASTOLIC BLOOD PRESSURE: 77 MMHG | SYSTOLIC BLOOD PRESSURE: 130 MMHG | RESPIRATION RATE: 16 BRPM | TEMPERATURE: 96.5 F

## 2018-07-24 PROCEDURE — 96368 THER/DIAG CONCURRENT INF: CPT

## 2018-07-24 PROCEDURE — G0498 CHEMO EXTEND IV INFUS W/PUMP: HCPCS

## 2018-07-24 PROCEDURE — 96415 CHEMO IV INFUSION ADDL HR: CPT

## 2018-07-24 PROCEDURE — 96411 CHEMO IV PUSH ADDL DRUG: CPT

## 2018-07-24 PROCEDURE — 96367 TX/PROPH/DG ADDL SEQ IV INF: CPT

## 2018-07-24 PROCEDURE — 96372 THER/PROPH/DIAG INJ SC/IM: CPT

## 2018-07-24 PROCEDURE — 96413 CHEMO IV INFUSION 1 HR: CPT

## 2018-07-24 RX ORDER — SODIUM CHLORIDE 9 MG/ML
20 INJECTION, SOLUTION INTRAVENOUS CONTINUOUS
Status: DISPENSED | OUTPATIENT
Start: 2018-07-24 | End: 2018-07-25

## 2018-07-24 RX ORDER — CYANOCOBALAMIN 1000 UG/ML
INJECTION INTRAMUSCULAR; SUBCUTANEOUS
Status: DISPENSED
Start: 2018-07-24 | End: 2018-07-24

## 2018-07-24 RX ORDER — CYANOCOBALAMIN 1000 UG/ML
INJECTION INTRAMUSCULAR; SUBCUTANEOUS
Status: COMPLETED
Start: 2018-07-24 | End: 2018-07-24

## 2018-07-24 RX ADMIN — DEXTROSE MONOHYDRATE 20 ML/HR: 50 INJECTION, SOLUTION INTRAVENOUS at 11:19

## 2018-07-24 RX ADMIN — CYANOCOBALAMIN 1000 MCG: 1000 INJECTION INTRAMUSCULAR; SUBCUTANEOUS at 09:33

## 2018-07-24 RX ADMIN — FLUOROURACIL 768 MG: 50 INJECTION, SOLUTION INTRAVENOUS at 13:37

## 2018-07-24 RX ADMIN — DIPHENHYDRAMINE HYDROCHLORIDE 50 MG: 50 INJECTION INTRAMUSCULAR; INTRAVENOUS at 10:15

## 2018-07-24 RX ADMIN — LEUCOVORIN CALCIUM 768 MG: 350 INJECTION, POWDER, LYOPHILIZED, FOR SOLUTION INTRAMUSCULAR; INTRAVENOUS at 11:26

## 2018-07-24 RX ADMIN — SODIUM CHLORIDE 150 MG: 9 INJECTION, SOLUTION INTRAVENOUS at 10:37

## 2018-07-24 RX ADMIN — ONDANSETRON HYDROCHLORIDE: 2 INJECTION, SOLUTION INTRAMUSCULAR; INTRAVENOUS at 09:55

## 2018-07-24 RX ADMIN — OXALIPLATIN 163 MG: 5 INJECTION, SOLUTION INTRAVENOUS at 11:26

## 2018-07-24 RX ADMIN — SODIUM CHLORIDE 20 ML/HR: 9 INJECTION, SOLUTION INTRAVENOUS at 09:18

## 2018-07-24 NOTE — PLAN OF CARE
Problem: Potential for Falls  Goal: Patient will remain free of falls  INTERVENTIONS:  - Assess patient frequently for physical needs  -  Identify cognitive and physical deficits and behaviors that affect risk of falls    -  [place] fall precautions as indicated by assessment   - Educate patient/family on patient safety including physical limitations  - Instruct patient to call for assistance with activity based on assessment  - Modify environment to reduce risk of injury  - Consider OT/PT consult to assist with strengthening/mobility   Outcome: Progressing

## 2018-07-24 NOTE — PROGRESS NOTES
Patient arrives for chemo/CADD pump and offers no complaints  Patient tolerated well without complications  Patient CADD pump connected and aware to return 12:00 on Thursday 7-26-18  AVS provided

## 2018-07-26 ENCOUNTER — HOSPITAL ENCOUNTER (OUTPATIENT)
Dept: INFUSION CENTER | Facility: HOSPITAL | Age: 63
Discharge: HOME | End: 2018-07-26
Payer: COMMERCIAL

## 2018-07-26 RX ORDER — HEPARIN SODIUM (PORCINE) LOCK FLUSH IV SOLN 100 UNIT/ML 100 UNIT/ML
300 SOLUTION INTRAVENOUS AS NEEDED
Status: DISCONTINUED | OUTPATIENT
Start: 2018-07-26 | End: 2018-07-30 | Stop reason: HOSPADM

## 2018-07-26 RX ADMIN — Medication 300 UNITS: at 12:09

## 2018-08-03 RX ORDER — SODIUM CHLORIDE 9 MG/ML
20 INJECTION, SOLUTION INTRAVENOUS CONTINUOUS
Status: DISCONTINUED | OUTPATIENT
Start: 2018-08-06 | End: 2018-08-07

## 2018-08-05 PROBLEM — I10 BENIGN ESSENTIAL HYPERTENSION: Status: ACTIVE | Noted: 2017-05-12

## 2018-08-05 PROBLEM — R45.89 DEPRESSED MOOD: Status: ACTIVE | Noted: 2017-05-12

## 2018-08-05 PROBLEM — K21.9 GERD (GASTROESOPHAGEAL REFLUX DISEASE): Status: ACTIVE | Noted: 2018-08-05

## 2018-08-05 PROBLEM — J45.909 ASTHMA: Status: ACTIVE | Noted: 2018-08-05

## 2018-08-05 PROBLEM — E78.00 HYPERCHOLESTEREMIA: Status: ACTIVE | Noted: 2018-08-05

## 2018-08-05 NOTE — ASSESSMENT & PLAN NOTE
Continue Abilify  Denies any suicidal or homicidal ideation    He actually states that he feels good

## 2018-08-05 NOTE — ASSESSMENT & PLAN NOTE
Currently blood pressure is controlled at this time  Reviewed BP target goal with patient  Continue to maintain healthy balanced diet with focus on low salt intake  Limit alcohol intake

## 2018-08-05 NOTE — ASSESSMENT & PLAN NOTE
Symptoms are currently controlled at this time  Avoid exposure to tobacco smoke, polluted air and other known asthma triggers  Monitor albuterol use to report back at follow up  Patient aware that increased albuterol use indicates poor control and may need further medication adjustment

## 2018-08-05 NOTE — ASSESSMENT & PLAN NOTE
S/P  low anterior resection of the rectosigmoid sigmoid junction mass by Dr Maricarmen Stuart  Stage IIIc (T4, N2a, M0) in March 28, 2018 He follows up with Oncology  Started  FOLFOX6 cycle #1 May 1, 2018

## 2018-08-06 ENCOUNTER — OFFICE VISIT (OUTPATIENT)
Dept: HEMATOLOGY ONCOLOGY | Facility: CLINIC | Age: 63
End: 2018-08-06
Payer: COMMERCIAL

## 2018-08-06 ENCOUNTER — HOSPITAL ENCOUNTER (OUTPATIENT)
Dept: INFUSION CENTER | Facility: HOSPITAL | Age: 63
Discharge: HOME | End: 2018-08-06
Payer: COMMERCIAL

## 2018-08-06 ENCOUNTER — OFFICE VISIT (OUTPATIENT)
Dept: FAMILY MEDICINE CLINIC | Facility: CLINIC | Age: 63
End: 2018-08-06
Payer: COMMERCIAL

## 2018-08-06 VITALS
DIASTOLIC BLOOD PRESSURE: 80 MMHG | BODY MASS INDEX: 25.24 KG/M2 | OXYGEN SATURATION: 97 % | SYSTOLIC BLOOD PRESSURE: 124 MMHG | TEMPERATURE: 98.1 F | RESPIRATION RATE: 16 BRPM | HEART RATE: 88 BPM | WEIGHT: 166 LBS

## 2018-08-06 VITALS
RESPIRATION RATE: 16 BRPM | HEART RATE: 96 BPM | OXYGEN SATURATION: 96 % | DIASTOLIC BLOOD PRESSURE: 70 MMHG | SYSTOLIC BLOOD PRESSURE: 114 MMHG

## 2018-08-06 VITALS
DIASTOLIC BLOOD PRESSURE: 95 MMHG | HEART RATE: 102 BPM | BODY MASS INDEX: 25.16 KG/M2 | SYSTOLIC BLOOD PRESSURE: 140 MMHG | OXYGEN SATURATION: 99 % | TEMPERATURE: 96.9 F | WEIGHT: 166 LBS | HEIGHT: 68 IN | RESPIRATION RATE: 16 BRPM

## 2018-08-06 DIAGNOSIS — R42 DIZZINESS: ICD-10-CM

## 2018-08-06 DIAGNOSIS — J45.909 UNCOMPLICATED ASTHMA, UNSPECIFIED ASTHMA SEVERITY, UNSPECIFIED WHETHER PERSISTENT: Primary | ICD-10-CM

## 2018-08-06 DIAGNOSIS — D50.9 IRON DEFICIENCY ANEMIA, UNSPECIFIED IRON DEFICIENCY ANEMIA TYPE: ICD-10-CM

## 2018-08-06 DIAGNOSIS — D51.3 OTHER DIETARY VITAMIN B12 DEFICIENCY ANEMIA: ICD-10-CM

## 2018-08-06 DIAGNOSIS — C19 RECTOSIGMOID JUNCTION CARCINOMA (HCC): Primary | Chronic | ICD-10-CM

## 2018-08-06 PROBLEM — F20.9 SCHIZOPHRENIA (HCC): Status: ACTIVE | Noted: 2018-08-06

## 2018-08-06 LAB
ALBUMIN SERPL BCP-MCNC: 4.1 G/DL (ref 3–5.2)
ALP SERPL-CCNC: 65 U/L (ref 43–122)
ALT SERPL W P-5'-P-CCNC: 60 U/L (ref 9–52)
ANION GAP SERPL CALCULATED.3IONS-SCNC: 10 MMOL/L (ref 5–14)
AST SERPL W P-5'-P-CCNC: 37 U/L (ref 17–59)
BASOPHILS # BLD AUTO: 0.07 THOUSAND/UL (ref 0–0.1)
BASOPHILS NFR MAR MANUAL: 1 % (ref 0–1)
BILIRUB SERPL-MCNC: 0.3 MG/DL
BUN SERPL-MCNC: 15 MG/DL (ref 5–25)
CALCIUM SERPL-MCNC: 9.3 MG/DL (ref 8.4–10.2)
CHLORIDE SERPL-SCNC: 103 MMOL/L (ref 97–108)
CO2 SERPL-SCNC: 29 MMOL/L (ref 22–30)
CREAT SERPL-MCNC: 0.87 MG/DL (ref 0.7–1.5)
EOSINOPHIL # BLD AUTO: 0.07 THOUSAND/UL (ref 0–0.4)
EOSINOPHIL NFR BLD MANUAL: 1 % (ref 0–6)
ERYTHROCYTE [DISTWIDTH] IN BLOOD BY AUTOMATED COUNT: 19.7 %
GFR SERPL CREATININE-BSD FRML MDRD: 107 ML/MIN/1.73SQ M
GLUCOSE SERPL-MCNC: 117 MG/DL (ref 70–99)
HCT VFR BLD AUTO: 37.8 % (ref 41–53)
HGB BLD-MCNC: 12.4 G/DL (ref 13.5–17.5)
LYMPHOCYTES # BLD AUTO: 2.08 THOUSAND/UL (ref 0.5–4)
LYMPHOCYTES # BLD AUTO: 31 % (ref 20–50)
MAGNESIUM SERPL-MCNC: 2 MG/DL (ref 1.6–2.3)
MCH RBC QN AUTO: 26.2 PG (ref 26–34)
MCHC RBC AUTO-ENTMCNC: 32.7 G/DL (ref 31–36)
MCV RBC AUTO: 80 FL (ref 80–100)
MONOCYTES # BLD AUTO: 0.54 THOUSAND/UL (ref 0.2–0.9)
MONOCYTES NFR BLD AUTO: 8 % (ref 1–10)
NEUTS BAND NFR BLD MANUAL: 2 % (ref 0–8)
NEUTS SEG # BLD: 3.95 THOUSAND/UL (ref 1.8–7.8)
NEUTS SEG NFR BLD AUTO: 57 %
PLATELET # BLD AUTO: 244 THOUSANDS/UL (ref 150–450)
PLATELET BLD QL SMEAR: ADEQUATE
PMV BLD AUTO: 8.4 FL (ref 8.9–12.7)
POTASSIUM SERPL-SCNC: 3.5 MMOL/L (ref 3.6–5)
PROT SERPL-MCNC: 7.3 G/DL (ref 5.9–8.4)
RBC # BLD AUTO: 4.72 MILLION/UL (ref 4.5–5.9)
RBC MORPH BLD: NORMAL
SODIUM SERPL-SCNC: 142 MMOL/L (ref 137–147)
TOTAL CELLS COUNTED SPEC: 100
WBC # BLD AUTO: 6.7 THOUSAND/UL (ref 4.5–11)

## 2018-08-06 PROCEDURE — 83735 ASSAY OF MAGNESIUM: CPT | Performed by: INTERNAL MEDICINE

## 2018-08-06 PROCEDURE — 99214 OFFICE O/P EST MOD 30 MIN: CPT | Performed by: NURSE PRACTITIONER

## 2018-08-06 PROCEDURE — 99213 OFFICE O/P EST LOW 20 MIN: CPT | Performed by: INTERNAL MEDICINE

## 2018-08-06 PROCEDURE — 85007 BL SMEAR W/DIFF WBC COUNT: CPT | Performed by: INTERNAL MEDICINE

## 2018-08-06 PROCEDURE — 85027 COMPLETE CBC AUTOMATED: CPT | Performed by: INTERNAL MEDICINE

## 2018-08-06 PROCEDURE — 80053 COMPREHEN METABOLIC PANEL: CPT | Performed by: INTERNAL MEDICINE

## 2018-08-06 RX ORDER — FLUOROURACIL 50 MG/ML
760 INJECTION, SOLUTION INTRAVENOUS ONCE
Status: COMPLETED | OUTPATIENT
Start: 2018-08-07 | End: 2018-08-07

## 2018-08-06 RX ORDER — ALBUTEROL SULFATE 90 UG/1
2 AEROSOL, METERED RESPIRATORY (INHALATION) EVERY 6 HOURS PRN
Qty: 8.5 G | Refills: 0 | Status: SHIPPED | OUTPATIENT
Start: 2018-08-06 | End: 2019-03-21 | Stop reason: SDUPTHER

## 2018-08-06 RX ORDER — ONDANSETRON HYDROCHLORIDE 8 MG/1
8 TABLET, FILM COATED ORAL EVERY 8 HOURS PRN
Qty: 20 TABLET | Refills: 3 | Status: SHIPPED | OUTPATIENT
Start: 2018-08-06 | End: 2020-02-25

## 2018-08-06 RX ORDER — HEPARIN SODIUM (PORCINE) LOCK FLUSH IV SOLN 100 UNIT/ML 100 UNIT/ML
300 SOLUTION INTRAVENOUS AS NEEDED
Status: ACTIVE | OUTPATIENT
Start: 2018-08-07 | End: 2018-08-08

## 2018-08-06 RX ORDER — DEXTROSE MONOHYDRATE 50 MG/ML
20 INJECTION, SOLUTION INTRAVENOUS ONCE
Status: COMPLETED | OUTPATIENT
Start: 2018-08-07 | End: 2018-08-07

## 2018-08-06 RX ADMIN — Medication 300 UNITS: at 12:15

## 2018-08-06 NOTE — PROGRESS NOTES
Assessment/Plan:    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HCC)  S/P  low anterior resection of the rectosigmoid sigmoid junction mass by Dr Jessica Mandujano  Stage IIIc (T4, N2a, M0) in March 28, 2018 He follows up with Oncology  Started  FOLFOX6 cycle #1 May 1, 2018  Benign essential hypertension  Currently blood pressure is controlled at this time  Reviewed BP target goal with patient  Continue to maintain healthy balanced diet with focus on low salt intake  Limit alcohol intake  Asthma  Symptoms are currently controlled at this time  Avoid exposure to tobacco smoke, polluted air and other known asthma triggers  Monitor albuterol use to report back at follow up  Patient aware that increased albuterol use indicates poor control and may need further medication adjustment  Hypercholesteremia  Continue Lipitor     Depressed mood  Continue Abilify  Denies any suicidal or homicidal ideation  He actually states that he feels good    GERD (gastroesophageal reflux disease)  Continue protonix     Schizophrenia ([address] )  He follows up with Psychiatry  He is on congentin, Abilify and Klonopin       Diagnoses and all orders for this visit:    Uncomplicated asthma, unspecified asthma severity, unspecified whether persistent  -     albuterol (PROAIR HFA) 90 mcg/act inhaler; Inhale 2 puffs every 6 (six) hours as needed for wheezing          Subjective:      Patient ID: Ben Yates is a 58 y o  male  BONNIE    Maite Vega is a 71-year-old pleasant but unfortunate male who was diagnosed with rectosigmoid cancer which was found on screening colonoscopy in March 2018  Patient is currently a phone up with Oncology  He had surgery and currently is getting chemotherapy every 2 weeks  He has a port on the right side of his chest  He states that he actually feels good and he is not fatigued  He denies any fever or chills  He has an appointment with Dr Latasha Wayne today    He is going for some blood work today and he is going for chemotherapy tomorrow  States that on the days when he gets chemotherapy gets nausea, vomiting and dizziness  I explained to him that these are expected side effects of chemo  And he should ask for Zofran after  his treatment  Reports compliance with the rest of his medication    The following portions of the patient's history were reviewed and updated as appropriate: current medications, past medical history, past social history, past surgical history and problem list     Review of Systems   Constitutional: Negative for chills, fatigue and fever  HENT: Negative for ear discharge, sneezing and sore throat  Eyes: Negative for pain and visual disturbance  Respiratory: Negative for cough, chest tightness and shortness of breath  Cardiovascular: Negative for chest pain and palpitations  Gastrointestinal: Negative for abdominal distention, abdominal pain, blood in stool, diarrhea, nausea and vomiting  Genitourinary: Negative for difficulty urinating, dysuria and flank pain  Musculoskeletal: Negative for arthralgias and joint swelling  Skin: Negative for pallor and rash  Neurological: Negative for dizziness, syncope and headaches  Hematological: Negative for adenopathy  Psychiatric/Behavioral: Negative for agitation and confusion  Objective:      /95   Pulse 102   Temp (!) 96 9 °F (36 1 °C) (Temporal)   Resp 16   Ht 5' 8" (1 727 m)   Wt 75 3 kg (166 lb)   SpO2 99%   BMI 25 24 kg/m²          Physical Exam   Constitutional: He is oriented to person, place, and time  He appears well-developed and well-nourished  HENT:   Head: Normocephalic and atraumatic  Mouth/Throat: Oropharynx is clear and moist    Eyes: Right eye exhibits no discharge  Left eye exhibits no discharge  Right pupil is reactive  Left pupil is reactive  Neck: Normal range of motion  Neck supple  No JVD present  No tracheal deviation present  No thyromegaly present     Cardiovascular: Normal rate, regular rhythm and normal heart sounds  Exam reveals no friction rub  No murmur heard  Pulmonary/Chest: No respiratory distress  He has no wheezes  He has no rales  He exhibits no tenderness  Port is noted on the right side of the chest  no erythema   Abdominal: Soft  Bowel sounds are normal  He exhibits no distension  There is no tenderness  Surgical scar is noted   Musculoskeletal: Normal range of motion  He exhibits no deformity  Neurological: He is alert and oriented to person, place, and time  Skin: Skin is warm and dry  No rash noted  No erythema       port is noted on the right side of the chest  no erythema

## 2018-08-06 NOTE — PROGRESS NOTES
Patient arrives for central labs for chemo tomorrow  Patient c/o episode lasting 2-3 days post chemo of being dizzy  Denies any SOB, fevers/chills  Patient is seeing Kassandra Juárez today after visit here  Patient confirmed appt tomorrow and AVS declined

## 2018-08-06 NOTE — PROGRESS NOTES
Hematology/Oncology Outpatient Follow-up  Isaias Haney 58 y o  male 1955 [id number]    Date:  8/6/2018      Assessment and Plan:  1  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[facility] [address] )  Patient is tolerating his chemo very well, he will proceed with Folfox cycle 8 tomorrow 8/7/18 as scheduled  He will return for follow-up in 2 weeks prior to his next treatment  Instructed patient to take his p r n  Zofran if he should develop nausea in the future  I did send a new script to the pharmacy  His ALT is mildly elevated at 60, we will continue to monitor this  - CBC and differential; Future  - Comprehensive metabolic panel; Future  - Vitamin B12; Future  - Iron Panel; Future  - Magnesium; Future  - Ferritin; Future  - ondansetron (ZOFRAN) 8 mg tablet; Take 1 tablet (8 mg total) by mouth every 8 (eight) hours as needed for nausea or vomiting  Dispense: 20 tablet; Refill: 3    2  Other dietary vitamin B12 deficiency anemia  Patient has a history of B12 deficiency  Will recheck prior to next visit  He will continue his monthly B12 injections as ordered  - Vitamin B12; Future    3  Iron deficiency anemia, unspecified iron deficiency anemia type  Patient also has a history of iron deficiency  Hemoglobin is stable at 12 4  We will check another iron panel prior to his next appointment  - CBC and differential; Future  - Comprehensive metabolic panel; Future  - Iron Panel; Future  - Ferritin; Future    4  Dizziness  Likely related to chemotherapy  Denies headaches or confusion with the dizziness  Will continue to monitor closely; 5 FU does have a rare side effect of neurotoxicity  Patient instructed to drink plenty of fluid at least 4 bottles of water per day and rest when he is experiencing dizziness  I also instructed him to call if the dizziness lasts for more than 2 days, gets worse, or if he is experiencing additional symptoms  He agrees       - CBC and differential; Future  - Comprehensive metabolic panel; Future  - Vitamin B12; Future  - Iron Panel; Future  - Magnesium; Future  - Ferritin; Future  - ondansetron (ZOFRAN) 8 mg tablet; Take 1 tablet (8 mg total) by mouth every 8 (eight) hours as needed for nausea or vomiting  Dispense: 20 tablet; Refill: 3    HPI:    Patient presents today for his 2 week follow-up  He is currently on chemotherapy with Folfox and is due for cycle #8 tomorrow  Patient states he feels pretty good today  He does mention that he had experienced some dizziness after cycle #7 for approximately 2 days and then it resolved  He admits that he is drinking a lot of fluids typically 4-6 bottles of water per day  He also states that he occasionally has some mild nausea after his chemo but he is not taking his p r n  Zofran for it; in fact he did not realize that he had nausea medication at home  Oncology History    Mr Srinivasan Maravilla was diagnosed with rectosigmoid adenocarcinoma in March of 2018  He was in his usual state of health until he started to notice blood per rectum with each bowel movement for approximately 2 months  He had a colonoscopy February 27th 2018 which revealed a rectosigmoid junction mass  Biopsy revealed moderately differentiated adenocarcinoma  Staging CT of the chest abdomen and pelvis on March 5, 2018 showed no significant hand of any metastatic process in the liver  He did have 3-5 mm round ground-glass opacities within the periphery periphery of the right upper lobe of the lung, 3-6 month follow-up was recommended  CT also revealed focal narrowing of the mid sigmoid colon with associated wall thickening and mild prostatic hypertrophy and posterior bladder diverticuli  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[address] )    3/28/2018 Initial Diagnosis     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[address] )          Chemotherapy     1  Started FOLFOX6 cycle #1 May 1, 2018  Surgery     1    March 28, 2018 low anterior resection of the rectosigmoid sigmoid junction mass by Dr Betsy Adams Josh  Stage IIIc (T4, N2a, M0)            Interval history:    ROS: Review of Systems   Constitutional: Negative for activity change, appetite change, chills, fatigue, fever and unexpected weight change  HENT: Negative for congestion, mouth sores, nosebleeds, sore throat and trouble swallowing  Eyes: Negative  Respiratory: Negative for cough, chest tightness and shortness of breath  Cardiovascular: Negative for chest pain, palpitations and leg swelling  Gastrointestinal: Positive for nausea (Mild at times)  Negative for abdominal distention, abdominal pain, blood in stool, constipation, diarrhea and vomiting  Genitourinary: Negative for difficulty urinating, dysuria, frequency, hematuria and urgency  Musculoskeletal: Negative for arthralgias, back pain, gait problem, joint swelling and myalgias  Skin: Negative for color change, pallor and rash  Neurological: Positive for dizziness ( as per HPI)  Negative for weakness, light-headedness, numbness and headaches  Hematological: Negative for adenopathy  Does not bruise/bleed easily  Psychiatric/Behavioral: Positive for sleep disturbance  Negative for dysphoric mood  The patient is not nervous/anxious          Past Medical History:   Diagnosis Date    Colon cancer ([facility] [address] )     Depression     Hypertension     Psychiatric disorder        Past Surgical History:   Procedure Laterality Date    ABDOMINAL SURGERY      PORTACATH PLACEMENT Right        Social History     Social History    Marital status: Single     Spouse name: N/A    Number of children: N/A    Years of education: N/A     Social History Main Topics    Smoking status: Former Smoker    Smokeless tobacco: Never Used      Comment: quit 20 years ago     Alcohol use 1 2 - 1 8 oz/week     2 - 3 Cans of beer per week      Comment: weekly    Drug use: No    Sexual activity: Not on file     Other Topics Concern    Not on file     Social History Narrative    No narrative on file Family History   Problem Relation Age of Onset    No Known Problems Mother     No Known Problems Father        No Known Allergies      Current Outpatient Prescriptions:     albuterol (PROAIR HFA) 90 mcg/act inhaler, Inhale 2 puffs every 6 (six) hours as needed for wheezing, Disp: 8 5 g, Rfl: 0    ARIPiprazole (ABILIFY) 20 MG tablet, TK 1 T PO QD , Disp: , Rfl: 2    ARTIFICIAL TEARS 1 4 % ophthalmic solution, USE 1 DROP IN BOTH EYES 3-4 TIMES PER DAY AS NEEDED, Disp: , Rfl: 0    aspirin (ECOTRIN LOW STRENGTH) 81 mg EC tablet, TK 1 T PO QD, Disp: , Rfl: 4    atorvastatin (LIPITOR) 20 mg tablet, Take 20 mg by mouth, Disp: , Rfl:     atorvastatin (LIPITOR) 20 mg tablet, Take 1 tablet (20 mg total) by mouth daily for 30 days, Disp: 30 tablet, Rfl: 2    benztropine (COGENTIN) 0 5 mg tablet, every 24 hours, Disp: , Rfl:     clonazePAM (KlonoPIN) 2 mg tablet, TK 1 T PO HS PRN , Disp: , Rfl: 2    enoxaparin (LOVENOX) 40 mg/0 4 mL, INJECT 0 4 ML UNDER THE SKIN DAILY FOR 21 DAYS, Disp: , Rfl: 0    hydrochlorothiazide (MICROZIDE) 12 5 mg capsule, Take 1 capsule (12 5 mg total) by mouth every morning for 30 days, Disp: 30 capsule, Rfl: 2    lidocaine-prilocaine (EMLA) cream, , Disp: , Rfl: 3    lisinopril (ZESTRIL) 40 mg tablet, Take 1 tablet (40 mg total) by mouth daily for 30 days, Disp: 30 tablet, Rfl: 2    ondansetron (ZOFRAN) 8 mg tablet, Take 1 tablet (8 mg total) by mouth every 8 (eight) hours as needed for nausea or vomiting, Disp: 20 tablet, Rfl: 3    pantoprazole (PROTONIX) 40 mg tablet, TK 1 T PO D, Disp: , Rfl: 3    Propylene Glycol-Glycerin 1-0 3 % SOLN, Use 3-4 times per day PRN, Disp: , Rfl:   No current facility-administered medications for this visit       Facility-Administered Medications Ordered in Other Visits:     [START ON 8/7/2018] alteplase (CATHFLO) injection 2 mg, 2 mg, Intracatheter, PRN, Apolinar Jara MD    [START ON 8/7/2018] dextrose 5 % infusion, 20 mL/hr, Intravenous, Once, MD Jose Gonzalez ON 8/7/2018] diphenhydrAMINE (BENADRYL) 50 mg in sodium chloride 0 9 % 50 mL IVPB, 50 mg, Intravenous, Once, Yolande Kimbrough MD    [START ON 8/7/2018] fluorouracil (ADRUCIL) injection 760 mg, 760 mg, Intravenous, Once, Yolande Kimbrough MD    [START ON 8/7/2018] fosaprepitant (EMEND) 150 mg in sodium chloride 0 9 % 250 mL IVPB, 150 mg, Intravenous, Once, Yolande Kimbrough MD    [START ON 8/7/2018] heparin flush (porcine) 100 units/mL injection 300 Units, 300 Units, Intracatheter, PRN, Yolande Kimbrough MD    [START ON 8/7/2018] leucovorin 760 mg in dextrose 5 % 250 mL IVPB, 760 mg, Intravenous, Once, Yolande Kimbrough MD    [START ON 8/7/2018] ondansetron (ZOFRAN) 16 mg, dexamethasone (DECADRON) 10 mg in sodium chloride 0 9 % 50 mL IVPB, , Intravenous, Once, Yolande Kimbrough MD    [START ON 8/7/2018] oxaliplatin (ELOXATIN) 162 mg in dextrose 5 % 250 mL chemo infusion, 162 mg, Intravenous, Once, Yolande Kimbrough MD    sodium chloride 0 9 % infusion, 20 mL/hr, Intravenous, Continuous, Yolande Kimbrough MD      Physical Exam:  /80 (BP Location: Left arm, Patient Position: Sitting, Cuff Size: Adult)   Pulse 88   Temp 98 1 °F (36 7 °C) (Tympanic)   Resp 16   Wt 75 3 kg (166 lb)   SpO2 97%   BMI 25 24 kg/m²     Physical Exam   Constitutional: He is oriented to person, place, and time  He appears well-developed and well-nourished  No distress  HENT:   Head: Normocephalic and atraumatic  Mouth/Throat: Oropharynx is clear and moist  No oropharyngeal exudate  Eyes: Conjunctivae are normal  Pupils are equal, round, and reactive to light  No scleral icterus  Neck: Normal range of motion  Neck supple  No thyromegaly present  Cardiovascular: Normal rate, regular rhythm, normal heart sounds and intact distal pulses  No murmur heard  Pulmonary/Chest: Effort normal and breath sounds normal  No respiratory distress  Abdominal: Soft  Bowel sounds are normal  He exhibits no distension   There is no hepatosplenomegaly  There is no tenderness  Musculoskeletal: Normal range of motion  He exhibits no edema  Lymphadenopathy:     He has no cervical adenopathy  He has no axillary adenopathy  Neurological: He is alert and oriented to person, place, and time  Skin: Skin is warm and dry  No rash noted  He is not diaphoretic  No erythema  No pallor  Psychiatric: He has a normal mood and affect  His behavior is normal  Judgment and thought content normal    Vitals reviewed  Labs:  Lab Results   Component Value Date    WBC 6 70 08/06/2018    HGB 12 4 (L) 08/06/2018    HCT 37 8 (L) 08/06/2018    MCV 80 08/06/2018     08/06/2018     Lab Results   Component Value Date     08/06/2018    K 3 5 (L) 08/06/2018     08/06/2018    CO2 29 08/06/2018    ANIONGAP 10 08/06/2018    BUN 15 08/06/2018    CREATININE 0 87 08/06/2018    GLUCOSE 117 (H) 08/06/2018    GLUF 85 04/30/2018    CALCIUM 9 3 08/06/2018    AST 37 08/06/2018    ALT 60 (H) 08/06/2018    ALKPHOS 65 08/06/2018    PROT 7 3 08/06/2018    BILITOT 0 30 08/06/2018    EGFR 107 08/06/2018         Patient voiced understanding and agreement in the above discussion  Aware to contact our office with questions/symptoms in the interim

## 2018-08-07 ENCOUNTER — HOSPITAL ENCOUNTER (OUTPATIENT)
Dept: INFUSION CENTER | Facility: HOSPITAL | Age: 63
Discharge: HOME | End: 2018-08-07
Payer: COMMERCIAL

## 2018-08-07 VITALS
BODY MASS INDEX: 25.9 KG/M2 | RESPIRATION RATE: 16 BRPM | WEIGHT: 165 LBS | HEIGHT: 67 IN | HEART RATE: 94 BPM | SYSTOLIC BLOOD PRESSURE: 104 MMHG | TEMPERATURE: 97.9 F | DIASTOLIC BLOOD PRESSURE: 72 MMHG

## 2018-08-07 PROCEDURE — 96367 TX/PROPH/DG ADDL SEQ IV INF: CPT

## 2018-08-07 PROCEDURE — 96411 CHEMO IV PUSH ADDL DRUG: CPT

## 2018-08-07 PROCEDURE — G0498 CHEMO EXTEND IV INFUS W/PUMP: HCPCS

## 2018-08-07 PROCEDURE — 96415 CHEMO IV INFUSION ADDL HR: CPT

## 2018-08-07 PROCEDURE — 96368 THER/DIAG CONCURRENT INF: CPT

## 2018-08-07 PROCEDURE — 96413 CHEMO IV INFUSION 1 HR: CPT

## 2018-08-07 RX ORDER — SODIUM CHLORIDE 9 MG/ML
20 INJECTION, SOLUTION INTRAVENOUS ONCE
Status: COMPLETED | OUTPATIENT
Start: 2018-08-07 | End: 2018-08-07

## 2018-08-07 RX ADMIN — OXALIPLATIN 162 MG: 5 INJECTION, SOLUTION INTRAVENOUS at 11:00

## 2018-08-07 RX ADMIN — DIPHENHYDRAMINE HYDROCHLORIDE 50 MG: 50 INJECTION INTRAMUSCULAR; INTRAVENOUS at 09:46

## 2018-08-07 RX ADMIN — SODIUM CHLORIDE 20 ML/HR: 9 INJECTION, SOLUTION INTRAVENOUS at 08:59

## 2018-08-07 RX ADMIN — DEXTROSE MONOHYDRATE 20 ML/HR: 50 INJECTION, SOLUTION INTRAVENOUS at 10:45

## 2018-08-07 RX ADMIN — LEUCOVORIN CALCIUM 760 MG: 350 INJECTION, POWDER, LYOPHILIZED, FOR SOLUTION INTRAMUSCULAR; INTRAVENOUS at 10:58

## 2018-08-07 RX ADMIN — DEXAMETHASONE SODIUM PHOSPHATE: 10 INJECTION, SOLUTION INTRAMUSCULAR; INTRAVENOUS at 09:24

## 2018-08-07 RX ADMIN — SODIUM CHLORIDE 150 MG: 9 INJECTION, SOLUTION INTRAVENOUS at 10:08

## 2018-08-07 RX ADMIN — FLUOROURACIL 760 MG: 50 INJECTION, SOLUTION INTRAVENOUS at 13:04

## 2018-08-07 NOTE — PROGRESS NOTES
Patient arrives without complaints  Patient aware to call DAISY Guadalupe if dizziness lasts more than 2 days and office number provided to patient  Patient tolerated treatment well and CADD pump connected and patient aware to return Thursday 8-9-18 at 11:30 for disconnect  AVS provided

## 2018-08-09 ENCOUNTER — HOSPITAL ENCOUNTER (OUTPATIENT)
Dept: INFUSION CENTER | Facility: HOSPITAL | Age: 63
Discharge: HOME | End: 2018-08-09
Payer: COMMERCIAL

## 2018-08-09 VITALS — DIASTOLIC BLOOD PRESSURE: 56 MMHG | SYSTOLIC BLOOD PRESSURE: 115 MMHG

## 2018-08-09 RX ADMIN — Medication 300 UNITS: at 11:36

## 2018-08-09 NOTE — PROGRESS NOTES
Admitted to infusion center today for cadd pump dc  [place] volume 0 0 upon return to unit  Port with good blood return  Flushed and deaccessed per routine  Pt states he continues to have some dizziness after chemotherapy treatments  "im drinking more like the doctor said " instructed tp to call into office if this worsens  Vital signs noted  Dc with avs ambulatory

## 2018-08-09 NOTE — PLAN OF CARE
Problem: SAFETY ADULT  Goal: Maintain or return mobility status to optimal level  INTERVENTIONS:  - Assess patient's baseline mobility status (ambulation, transfers, stairs, etc )    - Identify cognitive and physical deficits and behaviors that affect mobility  - Identify mobility aids required to assist with transfers and/or ambulation (gait belt, sit-to-stand, lift, walker, cane, etc )  - [place] fall precautions as indicated by assessment  - Record patient progress and toleration of activity level on Mobility SBAR; progress patient to next Phase/Stage  - Instruct patient to call for assistance with activity based on assessment  - Request Rehabilitation consult to assist with strengthening/weightbearing, etc   Outcome: Progressing

## 2018-08-09 NOTE — PLAN OF CARE
Problem: SAFETY ADULT  Goal: Patient will remain free of falls  INTERVENTIONS:  - Assess patient frequently for physical needs  -  Identify cognitive and physical deficits and behaviors that affect risk of falls    -  [place] fall precautions as indicated by assessment   - Educate patient/family on patient safety including physical limitations  - Instruct patient to call for assistance with activity based on assessment  - Modify environment to reduce risk of injury  - Consider OT/PT consult to assist with strengthening/mobility  Outcome: Progressing

## 2018-08-17 RX ORDER — HEPARIN SODIUM (PORCINE) LOCK FLUSH IV SOLN 100 UNIT/ML 100 UNIT/ML
300 SOLUTION INTRAVENOUS AS NEEDED
Status: DISCONTINUED | OUTPATIENT
Start: 2018-08-20 | End: 2018-08-20 | Stop reason: CLARIF

## 2018-08-17 RX ORDER — SODIUM CHLORIDE 9 MG/ML
20 INJECTION, SOLUTION INTRAVENOUS ONCE AS NEEDED
Status: DISCONTINUED | OUTPATIENT
Start: 2018-08-20 | End: 2018-08-24 | Stop reason: HOSPADM

## 2018-08-20 ENCOUNTER — HOSPITAL ENCOUNTER (OUTPATIENT)
Dept: INFUSION CENTER | Facility: HOSPITAL | Age: 63
Discharge: HOME | End: 2018-08-20
Payer: COMMERCIAL

## 2018-08-20 ENCOUNTER — OFFICE VISIT (OUTPATIENT)
Dept: HEMATOLOGY ONCOLOGY | Facility: CLINIC | Age: 63
End: 2018-08-20
Payer: COMMERCIAL

## 2018-08-20 VITALS
WEIGHT: 163.8 LBS | RESPIRATION RATE: 18 BRPM | BODY MASS INDEX: 25.71 KG/M2 | HEIGHT: 67 IN | HEART RATE: 96 BPM | DIASTOLIC BLOOD PRESSURE: 60 MMHG | TEMPERATURE: 98.3 F | SYSTOLIC BLOOD PRESSURE: 104 MMHG | OXYGEN SATURATION: 96 %

## 2018-08-20 DIAGNOSIS — C19 RECTOSIGMOID JUNCTION CARCINOMA (HCC): ICD-10-CM

## 2018-08-20 DIAGNOSIS — D51.3 OTHER DIETARY VITAMIN B12 DEFICIENCY ANEMIA: ICD-10-CM

## 2018-08-20 DIAGNOSIS — C19 RECTOSIGMOID JUNCTION CARCINOMA (HCC): Primary | Chronic | ICD-10-CM

## 2018-08-20 DIAGNOSIS — R42 DIZZINESS: ICD-10-CM

## 2018-08-20 DIAGNOSIS — D50.9 IRON DEFICIENCY ANEMIA, UNSPECIFIED IRON DEFICIENCY ANEMIA TYPE: ICD-10-CM

## 2018-08-20 LAB
ALBUMIN SERPL BCP-MCNC: 3.7 G/DL (ref 3–5.2)
ALP SERPL-CCNC: 69 U/L (ref 43–122)
ALT SERPL W P-5'-P-CCNC: 52 U/L (ref 9–52)
ANION GAP SERPL CALCULATED.3IONS-SCNC: 6 MMOL/L (ref 5–14)
AST SERPL W P-5'-P-CCNC: 42 U/L (ref 17–59)
BASOPHILS # BLD AUTO: 0 THOUSANDS/ΜL (ref 0–0.1)
BASOPHILS NFR BLD AUTO: 1 % (ref 0–1)
BILIRUB SERPL-MCNC: 0.3 MG/DL
BUN SERPL-MCNC: 15 MG/DL (ref 5–25)
CALCIUM SERPL-MCNC: 8.7 MG/DL (ref 8.4–10.2)
CHLORIDE SERPL-SCNC: 102 MMOL/L (ref 97–108)
CO2 SERPL-SCNC: 29 MMOL/L (ref 22–30)
CREAT SERPL-MCNC: 0.86 MG/DL (ref 0.7–1.5)
EOSINOPHIL # BLD AUTO: 0.1 THOUSAND/ΜL (ref 0–0.4)
EOSINOPHIL NFR BLD AUTO: 1 % (ref 0–6)
ERYTHROCYTE [DISTWIDTH] IN BLOOD BY AUTOMATED COUNT: 19.6 %
FERRITIN SERPL-MCNC: 144 NG/ML (ref 8–388)
GFR SERPL CREATININE-BSD FRML MDRD: 107 ML/MIN/1.73SQ M
GLUCOSE SERPL-MCNC: 106 MG/DL (ref 70–99)
HCT VFR BLD AUTO: 34.9 % (ref 41–53)
HGB BLD-MCNC: 11.5 G/DL (ref 13.5–17.5)
IRON SATN MFR SERPL: 9 %
IRON SERPL-MCNC: 37 UG/DL (ref 65–175)
LYMPHOCYTES # BLD AUTO: 2 THOUSANDS/ΜL (ref 0.5–4)
LYMPHOCYTES NFR BLD AUTO: 35 % (ref 20–50)
MAGNESIUM SERPL-MCNC: 2.1 MG/DL (ref 1.6–2.3)
MCH RBC QN AUTO: 26.4 PG (ref 26–34)
MCHC RBC AUTO-ENTMCNC: 33 G/DL (ref 31–36)
MCV RBC AUTO: 80 FL (ref 80–100)
MONOCYTES # BLD AUTO: 0.6 THOUSAND/ΜL (ref 0.2–0.9)
MONOCYTES NFR BLD AUTO: 11 % (ref 1–10)
NEUTROPHILS # BLD AUTO: 3 THOUSANDS/ΜL (ref 1.8–7.8)
NEUTS SEG NFR BLD AUTO: 52 % (ref 45–65)
PLATELET # BLD AUTO: 210 THOUSANDS/UL (ref 150–450)
PMV BLD AUTO: 7.9 FL (ref 8.9–12.7)
POTASSIUM SERPL-SCNC: 3.8 MMOL/L (ref 3.6–5)
PROT SERPL-MCNC: 6.8 G/DL (ref 5.9–8.4)
RBC # BLD AUTO: 4.37 MILLION/UL (ref 4.5–5.9)
SODIUM SERPL-SCNC: 137 MMOL/L (ref 137–147)
TIBC SERPL-MCNC: 407 UG/DL (ref 250–450)
VIT B12 SERPL-MCNC: 784 PG/ML (ref 100–900)
WBC # BLD AUTO: 5.8 THOUSAND/UL (ref 4.5–11)

## 2018-08-20 PROCEDURE — 82607 VITAMIN B-12: CPT

## 2018-08-20 PROCEDURE — 99214 OFFICE O/P EST MOD 30 MIN: CPT | Performed by: INTERNAL MEDICINE

## 2018-08-20 PROCEDURE — 82728 ASSAY OF FERRITIN: CPT

## 2018-08-20 PROCEDURE — 83540 ASSAY OF IRON: CPT

## 2018-08-20 PROCEDURE — 83550 IRON BINDING TEST: CPT

## 2018-08-20 PROCEDURE — 80053 COMPREHEN METABOLIC PANEL: CPT

## 2018-08-20 PROCEDURE — 83735 ASSAY OF MAGNESIUM: CPT

## 2018-08-20 PROCEDURE — 85025 COMPLETE CBC W/AUTO DIFF WBC: CPT

## 2018-08-20 RX ORDER — HEPARIN SODIUM (PORCINE) LOCK FLUSH IV SOLN 100 UNIT/ML 100 UNIT/ML
300 SOLUTION INTRAVENOUS CONTINUOUS PRN
Status: DISCONTINUED | OUTPATIENT
Start: 2018-08-21 | End: 2018-08-25 | Stop reason: HOSPADM

## 2018-08-20 RX ORDER — FOSAPREPITANT 150 MG/5ML
150 INJECTION, POWDER, LYOPHILIZED, FOR SOLUTION INTRAVENOUS ONCE
Status: DISCONTINUED | OUTPATIENT
Start: 2018-08-21 | End: 2018-08-20 | Stop reason: CLARIF

## 2018-08-20 RX ORDER — DEXTROSE MONOHYDRATE 50 MG/ML
20 INJECTION, SOLUTION INTRAVENOUS ONCE
Status: COMPLETED | OUTPATIENT
Start: 2018-08-21 | End: 2018-08-21

## 2018-08-20 RX ORDER — CYANOCOBALAMIN 1000 UG/ML
1000 INJECTION INTRAMUSCULAR; SUBCUTANEOUS ONCE
Status: COMPLETED | OUTPATIENT
Start: 2018-08-21 | End: 2018-08-21

## 2018-08-20 RX ORDER — LIDOCAINE 40 MG/G
CREAM TOPICAL ONCE
Status: COMPLETED | OUTPATIENT
Start: 2018-08-20 | End: 2018-08-20

## 2018-08-20 RX ORDER — FLUOROURACIL 50 MG/ML
752 INJECTION, SOLUTION INTRAVENOUS ONCE
Status: COMPLETED | OUTPATIENT
Start: 2018-08-21 | End: 2018-08-21

## 2018-08-20 RX ORDER — HEPARIN SODIUM (PORCINE) LOCK FLUSH IV SOLN 100 UNIT/ML 100 UNIT/ML
300 SOLUTION INTRAVENOUS ONCE
Status: DISCONTINUED | OUTPATIENT
Start: 2018-08-21 | End: 2018-08-20

## 2018-08-20 RX ADMIN — LIDOCAINE: 40 CREAM TOPICAL at 13:15

## 2018-08-20 RX ADMIN — HEPARIN SODIUM (PORCINE) LOCK FLUSH IV SOLN 100 UNIT/ML 300 UNITS: 100 SOLUTION at 14:31

## 2018-08-20 RX ADMIN — Medication 300 UNITS: at 14:31

## 2018-08-20 NOTE — PROGRESS NOTES
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[address] )  The patient will be continued on the current treatment with FOLFOX6 to complete a total of 6 months worth of adjuvant chemotherapy  He was assured that the weight loss he is experiencing is a very common scenario on chemotherapy  The dizziness is also much better which is most likely related to the 5 fluorouracil   - CBC and differential; Future  - Comprehensive metabolic panel; Future      HPI:  The patient came in today for a follow-up visit  He is feeling better with improved dizziness  He complained today about further weight loss and decreased appetite  Oncology History    Mr Zoe Delgado was diagnosed with rectosigmoid adenocarcinoma in March of 2018  He was in his usual state of health until he started to notice blood per rectum with each bowel movement for approximately 2 months  He had a colonoscopy February 27th 2018 which revealed a rectosigmoid junction mass  Biopsy revealed moderately differentiated adenocarcinoma  Staging CT of the chest abdomen and pelvis on March 5, 2018 showed no significant hand of any metastatic process in the liver  He did have 3-5 mm round ground-glass opacities within the periphery periphery of the right upper lobe of the lung, 3-6 month follow-up was recommended  CT also revealed focal narrowing of the mid sigmoid colon with associated wall thickening and mild prostatic hypertrophy and posterior bladder diverticuli  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[address] )    3/28/2018 Initial Diagnosis     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[address] )          Chemotherapy     1  Started FOLFOX6 cycle #1 May 1, 2018  Surgery     1  March 28, 2018 low anterior resection of the rectosigmoid sigmoid junction mass by Dr Roosevelt Walker   Stage IIIc (T4, N2a, M0)            Interval history:    ROS: Review of Systems Constitutional: Positive for appetite change and unexpected weight change  Gastrointestinal: Positive for diarrhea  Genitourinary: Positive for dysuria  Neurological: Positive for dizziness  Psychiatric/Behavioral: Positive for sleep disturbance         Past Medical History:   Diagnosis Date    [address] [facility] ([facility] [address] )     Depression     Hypertension     Psychiatric disorder        Past Surgical History:   Procedure Laterality Date    ABDOMINAL SURGERY      PORTACATH PLACEMENT Right        Social History     Social History    Marital status: Single     Spouse name: N/A    Number of children: N/A    Years of education: N/A     Social History Main Topics    Smoking status: Former Smoker    Smokeless tobacco: Never Used      Comment: quit 20 years ago     Alcohol use 1 2 - 1 8 oz/week     2 - 3 Cans of beer per week      Comment: weekly    Drug use: No    Sexual activity: Not Asked     Other Topics Concern    None     Social History Narrative    None       Family History   Problem Relation Age of Onset    No Known Problems Mother     No Known Problems Father        No Known Allergies      Current Outpatient Prescriptions:     albuterol (PROAIR HFA) 90 mcg/act inhaler, Inhale 2 puffs every 6 (six) hours as needed for wheezing, Disp: 8 5 g, Rfl: 0    ARIPiprazole (ABILIFY) 20 MG tablet, TK 1 T PO QD , Disp: , Rfl: 2    ARTIFICIAL TEARS 1 4 % ophthalmic solution, USE 1 DROP IN BOTH EYES 3-4 TIMES PER DAY AS NEEDED, Disp: , Rfl: 0    aspirin (ECOTRIN LOW STRENGTH) 81 mg EC tablet, TK 1 T PO QD, Disp: , Rfl: 4    atorvastatin (LIPITOR) 20 mg tablet, Take 20 mg by mouth, Disp: , Rfl:     atorvastatin (LIPITOR) 20 mg tablet, Take 1 tablet (20 mg total) by mouth daily for 30 days, Disp: 30 tablet, Rfl: 2    benztropine (COGENTIN) 0 5 mg tablet, every 24 hours, Disp: , Rfl:     clonazePAM (KlonoPIN) 2 mg tablet, TK 1 T PO HS PRN , Disp: , Rfl: 2    enoxaparin (LOVENOX) 40 mg/0 4 mL, INJECT 0 4 ML UNDER THE SKIN DAILY FOR 21 DAYS, Disp: , Rfl: 0    hydrochlorothiazide (MICROZIDE) 12 5 mg capsule, Take 1 capsule (12 5 mg total) by mouth every morning for 30 days, Disp: 30 capsule, Rfl: 2    lidocaine-prilocaine (EMLA) cream, , Disp: , Rfl: 3    lisinopril (ZESTRIL) 40 mg tablet, Take 1 tablet (40 mg total) by mouth daily for 30 days, Disp: 30 tablet, Rfl: 2    ondansetron (ZOFRAN) 8 mg tablet, Take 1 tablet (8 mg total) by mouth every 8 (eight) hours as needed for nausea or vomiting, Disp: 20 tablet, Rfl: 3    pantoprazole (PROTONIX) 40 mg tablet, TK 1 T PO D, Disp: , Rfl: 3    Propylene Glycol-Glycerin 1-0 3 % SOLN, Use 3-4 times per day PRN, Disp: , Rfl:   No current facility-administered medications for this visit  Facility-Administered Medications Ordered in Other Visits:     heparin flush (porcine) 100 units/mL injection 300 Units, 300 Units, Intracatheter, PRN, Tom Horvath MD    heparin lock flush 100 units/mL injection **AcuDose Override Pull**, , , ,     sodium chloride 0 9 % infusion, 20 mL/hr, Intravenous, Once PRN, Tom Horvath MD      Physical Exam:  /60 (BP Location: Left arm, Cuff Size: Standard)   Pulse 96   Temp 98 3 °F (36 8 °C) (Tympanic)   Resp 18   Ht 5' 7 32" (1 71 m)   Wt 74 3 kg (163 lb 12 8 oz)   SpO2 96%   BMI 25 41 kg/m²     Physical Exam   Constitutional: He is oriented to person, place, and time  He appears well-developed and well-nourished  HENT:   Head: Normocephalic and atraumatic  Nose: Nose normal    Mouth/Throat: Oropharynx is clear and moist    Eyes: Conjunctivae and EOM are normal  Pupils are equal, round, and reactive to light  Right eye exhibits no discharge  Left eye exhibits no discharge  No scleral icterus  Neck: Normal range of motion  Neck supple  No tracheal deviation present  No thyromegaly present  Cardiovascular: Normal rate, regular rhythm and normal heart sounds  Exam reveals no friction rub      No murmur heard   Pulmonary/Chest: Effort normal and breath sounds normal  No respiratory distress  He has no wheezes  He has no rales  He exhibits no tenderness  Abdominal: Soft  Bowel sounds are normal  He exhibits no distension and no mass  There is no hepatosplenomegaly, splenomegaly or hepatomegaly  There is no tenderness  There is no rebound and no guarding  Musculoskeletal: Normal range of motion  He exhibits no edema, tenderness or deformity  Lymphadenopathy:     He has no cervical adenopathy  Neurological: He is alert and oriented to person, place, and time  He has normal reflexes  No cranial nerve deficit  Coordination normal    Skin: Skin is warm and dry  No rash noted  No erythema  No pallor  Psychiatric: He has a normal mood and affect  His behavior is normal  Judgment and thought content normal          Labs:  Lab Results   Component Value Date    WBC 5 80 08/20/2018    HGB 11 5 (L) 08/20/2018    HCT 34 9 (L) 08/20/2018    MCV 80 08/20/2018     08/20/2018     Lab Results   Component Value Date     08/06/2018    K 3 5 (L) 08/06/2018     08/06/2018    CO2 29 08/06/2018    ANIONGAP 10 08/06/2018    BUN 15 08/06/2018    CREATININE 0 87 08/06/2018    GLUCOSE 117 (H) 08/06/2018    GLUF 85 04/30/2018    CALCIUM 9 3 08/06/2018    AST 37 08/06/2018    ALT 60 (H) 08/06/2018    ALKPHOS 65 08/06/2018    PROT 7 3 08/06/2018    BILITOT 0 30 08/06/2018    EGFR 107 08/06/2018     No results found for: TSH    Patient voiced understanding and agreement in the above discussion  Aware to contact our office with questions/symptoms in the interim

## 2018-08-20 NOTE — PROGRESS NOTES
Port accessed for lab testing  Emla cream applied prior  Good blood return noted, flushed per protocol   Returning tomorrow for tx

## 2018-08-21 ENCOUNTER — HOSPITAL ENCOUNTER (OUTPATIENT)
Dept: INFUSION CENTER | Facility: HOSPITAL | Age: 63
Discharge: HOME | End: 2018-08-21
Payer: COMMERCIAL

## 2018-08-21 VITALS
SYSTOLIC BLOOD PRESSURE: 129 MMHG | RESPIRATION RATE: 18 BRPM | BODY MASS INDEX: 25.9 KG/M2 | HEART RATE: 87 BPM | DIASTOLIC BLOOD PRESSURE: 77 MMHG | WEIGHT: 165 LBS | TEMPERATURE: 98.2 F | HEIGHT: 67 IN

## 2018-08-21 PROCEDURE — 96367 TX/PROPH/DG ADDL SEQ IV INF: CPT

## 2018-08-21 PROCEDURE — G0498 CHEMO EXTEND IV INFUS W/PUMP: HCPCS

## 2018-08-21 PROCEDURE — 96368 THER/DIAG CONCURRENT INF: CPT

## 2018-08-21 PROCEDURE — 96411 CHEMO IV PUSH ADDL DRUG: CPT

## 2018-08-21 PROCEDURE — 96413 CHEMO IV INFUSION 1 HR: CPT

## 2018-08-21 PROCEDURE — 96415 CHEMO IV INFUSION ADDL HR: CPT

## 2018-08-21 PROCEDURE — 96372 THER/PROPH/DIAG INJ SC/IM: CPT

## 2018-08-21 RX ORDER — SODIUM CHLORIDE 9 MG/ML
20 INJECTION, SOLUTION INTRAVENOUS CONTINUOUS
Status: DISCONTINUED | OUTPATIENT
Start: 2018-08-21 | End: 2018-08-22 | Stop reason: ALTCHOICE

## 2018-08-21 RX ORDER — CYANOCOBALAMIN 1000 UG/ML
INJECTION INTRAMUSCULAR; SUBCUTANEOUS
Status: COMPLETED
Start: 2018-08-21 | End: 2018-08-21

## 2018-08-21 RX ADMIN — OXALIPLATIN 160 MG: 5 INJECTION, SOLUTION INTRAVENOUS at 10:20

## 2018-08-21 RX ADMIN — DEXTROSE MONOHYDRATE 20 ML/HR: 50 INJECTION, SOLUTION INTRAVENOUS at 10:10

## 2018-08-21 RX ADMIN — DIPHENHYDRAMINE HYDROCHLORIDE 50 MG: 50 INJECTION INTRAMUSCULAR; INTRAVENOUS at 09:12

## 2018-08-21 RX ADMIN — CYANOCOBALAMIN 1000 MCG: 1000 INJECTION INTRAMUSCULAR; SUBCUTANEOUS at 12:13

## 2018-08-21 RX ADMIN — LEUCOVORIN CALCIUM 752 MG: 350 INJECTION, POWDER, LYOPHILIZED, FOR SOLUTION INTRAMUSCULAR; INTRAVENOUS at 10:15

## 2018-08-21 RX ADMIN — DEXAMETHASONE SODIUM PHOSPHATE: 10 INJECTION, SOLUTION INTRAMUSCULAR; INTRAVENOUS at 08:51

## 2018-08-21 RX ADMIN — SODIUM CHLORIDE 150 MG: 9 INJECTION, SOLUTION INTRAVENOUS at 09:34

## 2018-08-21 RX ADMIN — SODIUM CHLORIDE 20 ML/HR: 9 INJECTION, SOLUTION INTRAVENOUS at 08:15

## 2018-08-21 RX ADMIN — FLUOROURACIL 752 MG: 50 INJECTION, SOLUTION INTRAVENOUS at 12:26

## 2018-08-21 NOTE — PROGRESS NOTES
Pt tolerated chemo infusion w/out any adverse effects  Labs w/in parameters  Returning on 8/23 for cadd d/c   Offers no complaints

## 2018-08-22 RX ORDER — SODIUM CHLORIDE 9 MG/ML
20 INJECTION, SOLUTION INTRAVENOUS ONCE
Status: DISCONTINUED | OUTPATIENT
Start: 2018-08-23 | End: 2018-08-27 | Stop reason: HOSPADM

## 2018-08-22 RX ORDER — HEPARIN SODIUM (PORCINE) LOCK FLUSH IV SOLN 100 UNIT/ML 100 UNIT/ML
300 SOLUTION INTRAVENOUS ONCE
Status: COMPLETED | OUTPATIENT
Start: 2018-08-23 | End: 2018-08-23

## 2018-08-23 ENCOUNTER — HOSPITAL ENCOUNTER (OUTPATIENT)
Dept: INFUSION CENTER | Facility: HOSPITAL | Age: 63
Discharge: HOME | End: 2018-08-23
Payer: COMMERCIAL

## 2018-08-23 RX ADMIN — Medication 300 UNITS: at 11:02

## 2018-08-23 RX ADMIN — HEPARIN SODIUM (PORCINE) LOCK FLUSH IV SOLN 100 UNIT/ML 300 UNITS: 100 SOLUTION at 11:02

## 2018-08-23 NOTE — PROGRESS NOTES
Pt came to infusion center today for 5 fu cadd pump dc  [place] volume 0 0   Pt states he again had some dizziness with treatment  Reinforced same instructions he was givwen two weeks ago as to increase his oral fluids  He is to call office if his symptoms worsen or don't get better  Port site dressed with bandaid after routine flush  Discharged ambulatory

## 2018-08-30 RX ORDER — SODIUM CHLORIDE 9 MG/ML
20 INJECTION, SOLUTION INTRAVENOUS CONTINUOUS
Status: DISCONTINUED | OUTPATIENT
Start: 2018-08-31 | End: 2018-09-04 | Stop reason: HOSPADM

## 2018-08-31 ENCOUNTER — OFFICE VISIT (OUTPATIENT)
Dept: HEMATOLOGY ONCOLOGY | Facility: CLINIC | Age: 63
End: 2018-08-31
Payer: COMMERCIAL

## 2018-08-31 ENCOUNTER — HOSPITAL ENCOUNTER (OUTPATIENT)
Dept: INFUSION CENTER | Facility: HOSPITAL | Age: 63
Discharge: HOME | End: 2018-08-31

## 2018-08-31 VITALS
BODY MASS INDEX: 26.36 KG/M2 | OXYGEN SATURATION: 97 % | DIASTOLIC BLOOD PRESSURE: 82 MMHG | SYSTOLIC BLOOD PRESSURE: 132 MMHG | RESPIRATION RATE: 16 BRPM | TEMPERATURE: 98.1 F | HEART RATE: 90 BPM | WEIGHT: 164 LBS | HEIGHT: 66 IN

## 2018-08-31 DIAGNOSIS — D50.9 IRON DEFICIENCY ANEMIA, UNSPECIFIED IRON DEFICIENCY ANEMIA TYPE: ICD-10-CM

## 2018-08-31 DIAGNOSIS — C19 RECTOSIGMOID JUNCTION CARCINOMA (HCC): Primary | Chronic | ICD-10-CM

## 2018-08-31 PROCEDURE — 99214 OFFICE O/P EST MOD 30 MIN: CPT | Performed by: INTERNAL MEDICINE

## 2018-08-31 NOTE — PROGRESS NOTES
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[address] )  The patient will be continued on the adjuvant chemotherapy with FOLFOX6 cycle 10/12   - CBC and differential; Future  - Comprehensive metabolic panel; Future  - Magnesium; Future    2  Iron deficiency anemia, unspecified iron deficiency anemia type  We will add 1 dose of injectafer to his chemotherapy since he seems to be iron deficient  HPI:  The patient came today for a follow-up visit  He is tolerating the adjuvant chemotherapy very well without significant side effects  His blood work from the 20th of August showed mild anemia with hemoglobin of 11 5 his iron saturation is 9% with ferritin of 144  Oncology History    Mr Marissa Dasilva was diagnosed with rectosigmoid adenocarcinoma in March of 2018  He was in his usual state of health until he started to notice blood per rectum with each bowel movement for approximately 2 months  He had a colonoscopy February 27th 2018 which revealed a rectosigmoid junction mass  Biopsy revealed moderately differentiated adenocarcinoma  Staging CT of the chest abdomen and pelvis on March 5, 2018 showed no significant hand of any metastatic process in the liver  He did have 3-5 mm round ground-glass opacities within the periphery periphery of the right upper lobe of the lung, 3-6 month follow-up was recommended  CT also revealed focal narrowing of the mid sigmoid colon with associated wall thickening and mild prostatic hypertrophy and posterior bladder diverticuli  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[address] )    3/28/2018 Initial Diagnosis     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[address] )          Chemotherapy     1  Started FOLFOX6 cycle #1 May 1, 2018  Surgery     1  March 28, 2018 low anterior resection of the rectosigmoid sigmoid junction mass by Dr Alanna Gomez   Stage IIIc (T4, N2a, M0) Interval history:    ROS: Review of Systems   Constitutional: Negative for appetite change, diaphoresis, fatigue and fever  HENT: Negative for congestion, dental problem, facial swelling, hearing loss, tinnitus and trouble swallowing  Eyes: Negative for visual disturbance  Respiratory: Negative for cough, chest tightness, shortness of breath and wheezing  Cardiovascular: Negative for chest pain and leg swelling  Gastrointestinal: Negative for abdominal distention, abdominal pain, blood in stool, constipation, diarrhea, nausea and vomiting  Genitourinary: Negative for dysuria, hematuria and urgency  Musculoskeletal: Negative for arthralgias, myalgias and neck pain  Skin: Negative  Negative for color change, pallor, rash and wound  Neurological: Positive for numbness  Negative for dizziness, weakness and headaches  Hematological: Negative for adenopathy  Psychiatric/Behavioral: Negative for agitation, behavioral problems, confusion, hallucinations and self-injury  The patient is not nervous/anxious and is not hyperactive          Past Medical History:   Diagnosis Date    [facility] [address] [address] )     Depression     Hypertension     Psychiatric disorder        Past Surgical History:   Procedure Laterality Date    ABDOMINAL SURGERY      PORTACATH PLACEMENT Right        Social History     Social History    Marital status: Single     Spouse name: N/A    Number of children: N/A    Years of education: N/A     Social History Main Topics    Smoking status: Former Smoker    Smokeless tobacco: Never Used      Comment: quit 20 years ago     Alcohol use 1 2 - 1 8 oz/week     2 - 3 Cans of beer per week      Comment: weekly    Drug use: No    Sexual activity: Not Asked     Other Topics Concern    None     Social History Narrative    None       Family History   Problem Relation Age of Onset    No Known Problems Mother     No Known Problems Father        No Known Allergies      Current Outpatient Prescriptions:     albuterol (PROAIR HFA) 90 mcg/act inhaler, Inhale 2 puffs every 6 (six) hours as needed for wheezing, Disp: 8 5 g, Rfl: 0    ARIPiprazole (ABILIFY) 20 MG tablet, TK 1 T PO QD , Disp: , Rfl: 2    ARTIFICIAL TEARS 1 4 % ophthalmic solution, USE 1 DROP IN BOTH EYES 3-4 TIMES PER DAY AS NEEDED, Disp: , Rfl: 0    aspirin (ECOTRIN LOW STRENGTH) 81 mg EC tablet, TK 1 T PO QD, Disp: , Rfl: 4    benztropine (COGENTIN) 0 5 mg tablet, every 24 hours, Disp: , Rfl:     clonazePAM (KlonoPIN) 2 mg tablet, TK 1 T PO HS PRN , Disp: , Rfl: 2    lidocaine-prilocaine (EMLA) cream, , Disp: , Rfl: 3    ondansetron (ZOFRAN) 8 mg tablet, Take 1 tablet (8 mg total) by mouth every 8 (eight) hours as needed for nausea or vomiting, Disp: 20 tablet, Rfl: 3    pantoprazole (PROTONIX) 40 mg tablet, TK 1 T PO D, Disp: , Rfl: 3    Propylene Glycol-Glycerin 1-0 3 % SOLN, Use 3-4 times per day PRN, Disp: , Rfl:     atorvastatin (LIPITOR) 20 mg tablet, Take 1 tablet (20 mg total) by mouth daily for 30 days, Disp: 30 tablet, Rfl: 2    hydrochlorothiazide (MICROZIDE) 12 5 mg capsule, Take 1 capsule (12 5 mg total) by mouth every morning for 30 days, Disp: 30 capsule, Rfl: 2    lisinopril (ZESTRIL) 40 mg tablet, Take 1 tablet (40 mg total) by mouth daily for 30 days, Disp: 30 tablet, Rfl: 2  No current facility-administered medications for this visit  Facility-Administered Medications Ordered in Other Visits:     heparin lock flush 100 units/mL injection 300 Units, 300 Units, Intracatheter, PRN, Waqas Miller MD    sodium chloride 0 9 % infusion, 20 mL/hr, Intravenous, Continuous, Waqas Miller MD      Physical Exam:  /82 (BP Location: Left arm, Patient Position: Sitting, Cuff Size: Adult)   Pulse 90   Temp 98 1 °F (36 7 °C) (Tympanic)   Resp 16   Ht 5' 6" (1 676 m)   Wt 74 4 kg (164 lb)   SpO2 97%   BMI 26 47 kg/m²     Physical Exam   Constitutional: He is oriented to person, place, and time  He appears well-developed and well-nourished  HENT:   Head: Normocephalic and atraumatic  Nose: Nose normal    Mouth/Throat: Oropharynx is clear and moist    Eyes: Conjunctivae and EOM are normal  Pupils are equal, round, and reactive to light  Right eye exhibits no discharge  Left eye exhibits no discharge  No scleral icterus  Neck: Normal range of motion  Neck supple  No tracheal deviation present  No thyromegaly present  Cardiovascular: Normal rate, regular rhythm and normal heart sounds  Exam reveals no friction rub  No murmur heard  Pulmonary/Chest: Effort normal and breath sounds normal  No respiratory distress  He has no wheezes  He has no rales  He exhibits no tenderness  Abdominal: Soft  Bowel sounds are normal  He exhibits no distension and no mass  There is no hepatosplenomegaly, splenomegaly or hepatomegaly  There is no tenderness  There is no rebound and no guarding  Musculoskeletal: Normal range of motion  He exhibits no edema, tenderness or deformity  Lymphadenopathy:     He has no cervical adenopathy  Neurological: He is alert and oriented to person, place, and time  He has normal reflexes  No cranial nerve deficit  Coordination normal    Skin: Skin is warm and dry  No rash noted  No erythema  No pallor  Psychiatric: He has a normal mood and affect   His behavior is normal  Judgment and thought content normal          Labs:  Lab Results   Component Value Date    WBC 5 80 08/20/2018    HGB 11 5 (L) 08/20/2018    HCT 34 9 (L) 08/20/2018    MCV 80 08/20/2018     08/20/2018     Lab Results   Component Value Date     08/20/2018    K 3 8 08/20/2018     08/20/2018    CO2 29 08/20/2018    ANIONGAP 12 06/11/2018    BUN 15 08/20/2018    CREATININE 0 86 08/20/2018    GLUCOSE 101 (H) 06/11/2018    GLUF 85 04/30/2018    CALCIUM 8 7 08/20/2018    AST 42 08/20/2018    ALT 52 08/20/2018    ALKPHOS 69 08/20/2018    PROT 7 1 06/11/2018    BILITOT 0 3 06/11/2018    EGFR 107 08/20/2018     No results found for: TSH    Patient voiced understanding and agreement in the above discussion  Aware to contact our office with questions/symptoms in the interim

## 2018-09-01 RX ORDER — HEPARIN SODIUM (PORCINE) LOCK FLUSH IV SOLN 100 UNIT/ML 100 UNIT/ML
300 SOLUTION INTRAVENOUS AS NEEDED
Status: DISCONTINUED | OUTPATIENT
Start: 2018-09-04 | End: 2018-09-05 | Stop reason: SDUPTHER

## 2018-09-01 RX ORDER — SODIUM CHLORIDE 9 MG/ML
20 INJECTION, SOLUTION INTRAVENOUS CONTINUOUS
Status: DISCONTINUED | OUTPATIENT
Start: 2018-09-04 | End: 2018-09-08 | Stop reason: HOSPADM

## 2018-09-01 RX ORDER — DEXTROSE MONOHYDRATE 50 MG/ML
20 INJECTION, SOLUTION INTRAVENOUS CONTINUOUS
Status: DISCONTINUED | OUTPATIENT
Start: 2018-09-04 | End: 2018-09-08 | Stop reason: HOSPADM

## 2018-09-01 RX ORDER — FLUOROURACIL 50 MG/ML
752 INJECTION, SOLUTION INTRAVENOUS ONCE
Status: COMPLETED | OUTPATIENT
Start: 2018-09-04 | End: 2018-09-04

## 2018-09-04 ENCOUNTER — HOSPITAL ENCOUNTER (OUTPATIENT)
Dept: INFUSION CENTER | Facility: HOSPITAL | Age: 63
Discharge: HOME | End: 2018-09-04
Payer: COMMERCIAL

## 2018-09-04 VITALS
HEIGHT: 67 IN | WEIGHT: 166.89 LBS | OXYGEN SATURATION: 99 % | RESPIRATION RATE: 18 BRPM | DIASTOLIC BLOOD PRESSURE: 87 MMHG | BODY MASS INDEX: 26.19 KG/M2 | SYSTOLIC BLOOD PRESSURE: 145 MMHG | HEART RATE: 101 BPM | TEMPERATURE: 97.7 F

## 2018-09-04 DIAGNOSIS — C19 RECTOSIGMOID JUNCTION CARCINOMA (HCC): Chronic | ICD-10-CM

## 2018-09-04 LAB
ALBUMIN SERPL BCP-MCNC: 3.7 G/DL (ref 3–5.2)
ALP SERPL-CCNC: 64 U/L (ref 43–122)
ALT SERPL W P-5'-P-CCNC: 53 U/L (ref 9–52)
ANION GAP SERPL CALCULATED.3IONS-SCNC: 6 MMOL/L (ref 5–14)
AST SERPL W P-5'-P-CCNC: 45 U/L (ref 17–59)
BASOPHILS # BLD AUTO: 0 THOUSANDS/ΜL (ref 0–0.1)
BASOPHILS NFR BLD AUTO: 1 % (ref 0–1)
BILIRUB SERPL-MCNC: 0.5 MG/DL
BUN SERPL-MCNC: 14 MG/DL (ref 5–25)
CALCIUM SERPL-MCNC: 8.8 MG/DL (ref 8.4–10.2)
CHLORIDE SERPL-SCNC: 105 MMOL/L (ref 97–108)
CO2 SERPL-SCNC: 29 MMOL/L (ref 22–30)
CREAT SERPL-MCNC: 0.78 MG/DL (ref 0.7–1.5)
EOSINOPHIL # BLD AUTO: 0.1 THOUSAND/ΜL (ref 0–0.4)
EOSINOPHIL NFR BLD AUTO: 1 % (ref 0–6)
ERYTHROCYTE [DISTWIDTH] IN BLOOD BY AUTOMATED COUNT: 21.5 %
GFR SERPL CREATININE-BSD FRML MDRD: 111 ML/MIN/1.73SQ M
GLUCOSE SERPL-MCNC: 146 MG/DL (ref 70–99)
HCT VFR BLD AUTO: 37.4 % (ref 41–53)
HGB BLD-MCNC: 12.2 G/DL (ref 13.5–17.5)
LYMPHOCYTES # BLD AUTO: 2.1 THOUSANDS/ΜL (ref 0.5–4)
LYMPHOCYTES NFR BLD AUTO: 39 % (ref 20–50)
MAGNESIUM SERPL-MCNC: 2 MG/DL (ref 1.6–2.3)
MCH RBC QN AUTO: 26.6 PG (ref 26–34)
MCHC RBC AUTO-ENTMCNC: 32.5 G/DL (ref 31–36)
MCV RBC AUTO: 82 FL (ref 80–100)
MONOCYTES # BLD AUTO: 0.6 THOUSAND/ΜL (ref 0.2–0.9)
MONOCYTES NFR BLD AUTO: 11 % (ref 1–10)
NEUTROPHILS # BLD AUTO: 2.6 THOUSANDS/ΜL (ref 1.8–7.8)
NEUTS SEG NFR BLD AUTO: 49 % (ref 45–65)
PLATELET # BLD AUTO: 175 THOUSANDS/UL (ref 150–450)
PMV BLD AUTO: 8.7 FL (ref 8.9–12.7)
POTASSIUM SERPL-SCNC: 3.6 MMOL/L (ref 3.6–5)
PROT SERPL-MCNC: 7 G/DL (ref 5.9–8.4)
RBC # BLD AUTO: 4.58 MILLION/UL (ref 4.5–5.9)
SODIUM SERPL-SCNC: 140 MMOL/L (ref 137–147)
WBC # BLD AUTO: 5.4 THOUSAND/UL (ref 4.5–11)

## 2018-09-04 PROCEDURE — 96413 CHEMO IV INFUSION 1 HR: CPT

## 2018-09-04 PROCEDURE — 85025 COMPLETE CBC W/AUTO DIFF WBC: CPT

## 2018-09-04 PROCEDURE — G0498 CHEMO EXTEND IV INFUS W/PUMP: HCPCS

## 2018-09-04 PROCEDURE — 96368 THER/DIAG CONCURRENT INF: CPT

## 2018-09-04 PROCEDURE — 80053 COMPREHEN METABOLIC PANEL: CPT

## 2018-09-04 PROCEDURE — 96411 CHEMO IV PUSH ADDL DRUG: CPT

## 2018-09-04 PROCEDURE — 96415 CHEMO IV INFUSION ADDL HR: CPT

## 2018-09-04 PROCEDURE — 96367 TX/PROPH/DG ADDL SEQ IV INF: CPT

## 2018-09-04 PROCEDURE — 83735 ASSAY OF MAGNESIUM: CPT

## 2018-09-04 RX ADMIN — DEXTROSE MONOHYDRATE 20 ML/HR: 50 INJECTION, SOLUTION INTRAVENOUS at 09:50

## 2018-09-04 RX ADMIN — DIPHENHYDRAMINE HYDROCHLORIDE 50 MG: 50 INJECTION INTRAMUSCULAR; INTRAVENOUS at 10:12

## 2018-09-04 RX ADMIN — LEUCOVORIN CALCIUM 752 MG: 350 INJECTION, POWDER, LYOPHILIZED, FOR SOLUTION INTRAMUSCULAR; INTRAVENOUS at 11:35

## 2018-09-04 RX ADMIN — FLUOROURACIL 752 MG: 50 INJECTION, SOLUTION INTRAVENOUS at 13:44

## 2018-09-04 RX ADMIN — SODIUM CHLORIDE 20 ML/HR: 9 INJECTION, SOLUTION INTRAVENOUS at 09:30

## 2018-09-04 RX ADMIN — SODIUM CHLORIDE 150 MG: 9 INJECTION, SOLUTION INTRAVENOUS at 10:49

## 2018-09-04 RX ADMIN — DEXAMETHASONE SODIUM PHOSPHATE: 10 INJECTION, SOLUTION INTRAMUSCULAR; INTRAVENOUS at 10:30

## 2018-09-04 RX ADMIN — OXALIPLATIN 160 MG: 5 INJECTION, SOLUTION INTRAVENOUS at 11:35

## 2018-09-04 RX ADMIN — FERRIC CARBOXYMALTOSE INJECTION 750 MG: 50 INJECTION, SOLUTION INTRAVENOUS at 09:29

## 2018-09-04 NOTE — PROGRESS NOTES
Completed all meds as per orders  Pt connected to 46 hour 5fu cadd pump  All connections and clamps taped securely  Pt has worn cadd home multiple times and is familiar with device  Discharged ambulatory with avs and instructions to return in 46 hours  Pt verbalized understanding

## 2018-09-04 NOTE — PLAN OF CARE

## 2018-09-04 NOTE — PROGRESS NOTES
Admitted to infusion center today for chemotherapy  Tc from dr reed office that pt would be also getting injectofer today  Pt did not come for labs last Friday and therefore labs were just drawn  Will await results until results are back  Pt aware  nss at o

## 2018-09-04 NOTE — PROGRESS NOTES
Lab results noted  Pt tolerated injectofer without reaction  Pt education information given through [employer] in [address]  Pt verbalized understanding  Chemotherapy now infusing as per orders  No adverse reaction noted  Port site clean and dry  Resting quietly in recliner  Tolerated lunch well

## 2018-09-05 RX ORDER — HEPARIN SODIUM (PORCINE) LOCK FLUSH IV SOLN 100 UNIT/ML 100 UNIT/ML
300 SOLUTION INTRAVENOUS AS NEEDED
Status: ACTIVE | OUTPATIENT
Start: 2018-09-06 | End: 2018-09-07

## 2018-09-06 ENCOUNTER — HOSPITAL ENCOUNTER (OUTPATIENT)
Dept: INFUSION CENTER | Facility: HOSPITAL | Age: 63
Discharge: HOME | End: 2018-09-06
Payer: COMMERCIAL

## 2018-09-06 VITALS
DIASTOLIC BLOOD PRESSURE: 59 MMHG | SYSTOLIC BLOOD PRESSURE: 107 MMHG | OXYGEN SATURATION: 94 % | TEMPERATURE: 97 F | HEART RATE: 100 BPM | RESPIRATION RATE: 16 BRPM

## 2018-09-06 RX ADMIN — Medication 300 UNITS: at 12:01

## 2018-09-06 NOTE — PROGRESS NOTES
Patient arrives to unit c/o dizziness  States this happens 2-3 days after he gets 5FU  Spoke with DAISY Bermudez and will order 1 liter of NSS over one hour for him today  After relaying information to patient about fluids, he refused and stated he was no longer dizzy and needed to leave for an appt  CADD pump disconnected at 1201 and port flushed per protocol    Patient discharged and AVS provided with next appts

## 2018-09-10 DIAGNOSIS — Z51.11 ENCOUNTER FOR CHEMOTHERAPY MANAGEMENT: Primary | ICD-10-CM

## 2018-09-10 RX ORDER — PANTOPRAZOLE SODIUM 40 MG/1
TABLET, DELAYED RELEASE ORAL
Qty: 30 TABLET | Refills: 2 | Status: SHIPPED | OUTPATIENT
Start: 2018-09-10 | End: 2019-03-21 | Stop reason: SDUPTHER

## 2018-09-14 ENCOUNTER — OFFICE VISIT (OUTPATIENT)
Dept: HEMATOLOGY ONCOLOGY | Facility: CLINIC | Age: 63
End: 2018-09-14
Payer: COMMERCIAL

## 2018-09-14 VITALS
RESPIRATION RATE: 16 BRPM | HEIGHT: 66 IN | SYSTOLIC BLOOD PRESSURE: 128 MMHG | BODY MASS INDEX: 26.52 KG/M2 | DIASTOLIC BLOOD PRESSURE: 82 MMHG | HEART RATE: 102 BPM | OXYGEN SATURATION: 96 % | TEMPERATURE: 97.7 F | WEIGHT: 165 LBS

## 2018-09-14 DIAGNOSIS — C19 RECTOSIGMOID JUNCTION CARCINOMA (HCC): Primary | Chronic | ICD-10-CM

## 2018-09-14 DIAGNOSIS — D51.3 OTHER DIETARY VITAMIN B12 DEFICIENCY ANEMIA: ICD-10-CM

## 2018-09-14 PROCEDURE — 99214 OFFICE O/P EST MOD 30 MIN: CPT | Performed by: INTERNAL MEDICINE

## 2018-09-14 RX ORDER — DEXTROSE MONOHYDRATE 50 MG/ML
20 INJECTION, SOLUTION INTRAVENOUS CONTINUOUS
Status: DISCONTINUED | OUTPATIENT
Start: 2018-09-17 | End: 2018-09-21 | Stop reason: HOSPADM

## 2018-09-14 RX ORDER — HEPARIN SODIUM (PORCINE) LOCK FLUSH IV SOLN 100 UNIT/ML 100 UNIT/ML
300 SOLUTION INTRAVENOUS AS NEEDED
Status: DISCONTINUED | OUTPATIENT
Start: 2018-09-17 | End: 2018-09-21 | Stop reason: HOSPADM

## 2018-09-14 RX ORDER — SODIUM CHLORIDE 9 MG/ML
20 INJECTION, SOLUTION INTRAVENOUS CONTINUOUS
Status: DISCONTINUED | OUTPATIENT
Start: 2018-09-17 | End: 2018-09-21 | Stop reason: HOSPADM

## 2018-09-14 RX ORDER — CYANOCOBALAMIN 1000 UG/ML
1000 INJECTION INTRAMUSCULAR; SUBCUTANEOUS ONCE
Status: COMPLETED | OUTPATIENT
Start: 2018-09-17 | End: 2018-09-17

## 2018-09-14 RX ORDER — FLUOROURACIL 50 MG/ML
752 INJECTION, SOLUTION INTRAVENOUS ONCE
Status: COMPLETED | OUTPATIENT
Start: 2018-09-17 | End: 2018-09-17

## 2018-09-14 NOTE — PROGRESS NOTES
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[address] )  The patient will be continued on the adjuvant chemotherapy with FOLFOX6 cycle 11/12  The dizziness is most likely related to the 5 FU which seems to be tolerable  We will aim to complete the adjuvant chemotherapy as planned  - CBC and differential; Future  - Comprehensive metabolic panel; Future  - Magnesium; Future    2  Iron deficiency anemia, unspecified iron deficiency anemia type  We will recheck his iron panel after the completion of his adjuvant chemotherapy  HPI:  The patient came today for a follow-up visit  He is tolerating the adjuvant chemotherapy relatively well  He stated that he started to notice significant dizziness couple days after he got discontinued from the 5 FU pump  This lasted for couple of days and got back to the normal condition  He is now asymptomatic  Oncology History    Mr Trenton Tavera was diagnosed with rectosigmoid adenocarcinoma in March of 2018  He was in his usual state of health until he started to notice blood per rectum with each bowel movement for approximately 2 months  He had a colonoscopy February 27th 2018 which revealed a rectosigmoid junction mass  Biopsy revealed moderately differentiated adenocarcinoma  Staging CT of the chest abdomen and pelvis on March 5, 2018 showed no significant hand of any metastatic process in the liver  He did have 3-5 mm round ground-glass opacities within the periphery periphery of the right upper lobe of the lung, 3-6 month follow-up was recommended  CT also revealed focal narrowing of the mid sigmoid colon with associated wall thickening and mild prostatic hypertrophy and posterior bladder diverticuli  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[address] )    3/28/2018 Initial Diagnosis     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[address] )          Chemotherapy     1  Started FOLFOX6 cycle #1 May 1, 2018  Surgery     1  March 28, 2018 low anterior resection of the rectosigmoid sigmoid junction mass by Dr Hamilton Brain  Stage IIIc (T4, N2a, M0)            Interval history:    ROS: Review of Systems   Constitutional: Negative for appetite change, diaphoresis, fatigue and fever  HENT: Negative for congestion, dental problem, facial swelling, hearing loss, tinnitus and trouble swallowing  Eyes: Negative for visual disturbance  Respiratory: Negative for cough, chest tightness, shortness of breath and wheezing  Cardiovascular: Negative for chest pain and leg swelling  Gastrointestinal: Negative for abdominal distention, abdominal pain, blood in stool, constipation, diarrhea, nausea and vomiting  Genitourinary: Negative for dysuria, hematuria and urgency  Musculoskeletal: Negative for arthralgias, myalgias and neck pain  Skin: Negative  Negative for color change, pallor, rash and wound  Neurological: Positive for dizziness (Started couple days after he was disconnected from the 5 FU pump)  Negative for weakness and headaches  Hematological: Negative for adenopathy  Psychiatric/Behavioral: Negative for agitation, behavioral problems, confusion, hallucinations and self-injury  The patient is not nervous/anxious and is not hyperactive          Past Medical History:   Diagnosis Date    [facility] [address] [address]     Depression     Hypertension     Psychiatric disorder        Past Surgical History:   Procedure Laterality Date    ABDOMINAL SURGERY      PORTACATH PLACEMENT Right        Social History     Social History    Marital status: Single     Spouse name: N/A    Number of children: N/A    Years of education: N/A     Social History Main Topics    Smoking status: Former Smoker    Smokeless tobacco: Never Used      Comment: quit 20 years ago     Alcohol use 1 2 - 1 8 oz/week     2 - 3 Cans of beer per week      Comment: weekly    Drug use: No    Sexual activity: Not on file     Other Topics Concern    Not on file     Social History Narrative    No narrative on file       Family History   Problem Relation Age of Onset    No Known Problems Mother     No Known Problems Father        No Known Allergies      Current Outpatient Prescriptions:     albuterol (PROAIR HFA) 90 mcg/act inhaler, Inhale 2 puffs every 6 (six) hours as needed for wheezing, Disp: 8 5 g, Rfl: 0    ARIPiprazole (ABILIFY) 20 MG tablet, TK 1 T PO QD , Disp: , Rfl: 2    ARTIFICIAL TEARS 1 4 % ophthalmic solution, USE 1 DROP IN BOTH EYES 3-4 TIMES PER DAY AS NEEDED, Disp: , Rfl: 0    aspirin (ECOTRIN LOW STRENGTH) 81 mg EC tablet, TK 1 T PO QD, Disp: , Rfl: 4    benztropine (COGENTIN) 0 5 mg tablet, every 24 hours, Disp: , Rfl:     clonazePAM (KlonoPIN) 2 mg tablet, TK 1 T PO HS PRN , Disp: , Rfl: 2    lidocaine-prilocaine (EMLA) cream, , Disp: , Rfl: 3    ondansetron (ZOFRAN) 8 mg tablet, Take 1 tablet (8 mg total) by mouth every 8 (eight) hours as needed for nausea or vomiting, Disp: 20 tablet, Rfl: 3    pantoprazole (PROTONIX) 40 mg tablet, TAKE 1 TABLET BY MOUTH DAILY, Disp: 30 tablet, Rfl: 2    Propylene Glycol-Glycerin 1-0 3 % SOLN, Use 3-4 times per day PRN, Disp: , Rfl:     atorvastatin (LIPITOR) 20 mg tablet, Take 1 tablet (20 mg total) by mouth daily for 30 days, Disp: 30 tablet, Rfl: 2    hydrochlorothiazide (MICROZIDE) 12 5 mg capsule, Take 1 capsule (12 5 mg total) by mouth every morning for 30 days, Disp: 30 capsule, Rfl: 2    lisinopril (ZESTRIL) 40 mg tablet, Take 1 tablet (40 mg total) by mouth daily for 30 days, Disp: 30 tablet, Rfl: 2  No current facility-administered medications for this visit       Facility-Administered Medications Ordered in Other Visits:     heparin lock flush 100 units/mL injection 300 Units, 300 Units, Intracatheter, Q1H PRN, Zahra Mathews MD    sodium chloride 0 9 % infusion, 20 mL/hr, Intravenous, Once, Zahra Mathews MD      Physical Exam:  BP 128/82 (BP Location: Left arm, Patient Position: Sitting, Cuff Size: Adult)   Pulse 102   Temp 97 7 °F (36 5 °C) (Tympanic)   Resp 16   Ht 5' 6" (1 676 m)   Wt 74 8 kg (165 lb)   SpO2 96%   BMI 26 63 kg/m²     Physical Exam   Constitutional: He is oriented to person, place, and time  He appears well-developed and well-nourished  HENT:   Head: Normocephalic and atraumatic  Nose: Nose normal    Mouth/Throat: Oropharynx is clear and moist    Eyes: Conjunctivae and EOM are normal  Pupils are equal, round, and reactive to light  Right eye exhibits no discharge  Left eye exhibits no discharge  No scleral icterus  Neck: Normal range of motion  Neck supple  No tracheal deviation present  No thyromegaly present  Cardiovascular: Normal rate, regular rhythm and normal heart sounds  Exam reveals no friction rub  No murmur heard  Pulmonary/Chest: Effort normal and breath sounds normal  No respiratory distress  He has no wheezes  He has no rales  He exhibits no tenderness  Abdominal: Soft  Bowel sounds are normal  He exhibits no distension and no mass  There is no hepatosplenomegaly, splenomegaly or hepatomegaly  There is no tenderness  There is no rebound and no guarding  Musculoskeletal: Normal range of motion  He exhibits no edema, tenderness or deformity  Lymphadenopathy:     He has no cervical adenopathy  Neurological: He is alert and oriented to person, place, and time  He has normal reflexes  No cranial nerve deficit  Coordination normal    Skin: Skin is warm and dry  No rash noted  No erythema  No pallor  Psychiatric: He has a normal mood and affect   His behavior is normal  Judgment and thought content normal          Labs:  Lab Results   Component Value Date    WBC 5 40 09/04/2018    HGB 12 2 (L) 09/04/2018    HCT 37 4 (L) 09/04/2018    MCV 82 09/04/2018     09/04/2018     Lab Results   Component Value Date     09/04/2018    K 3 6 09/04/2018     09/04/2018    CO2 29 09/04/2018    ANIONGAP 12 06/11/2018    BUN 14 09/04/2018    CREATININE 0 78 09/04/2018    GLUCOSE 101 (H) 06/11/2018    GLUF 85 04/30/2018    CALCIUM 8 8 09/04/2018    AST 45 09/04/2018    ALT 53 (H) 09/04/2018    ALKPHOS 64 09/04/2018    PROT 7 1 06/11/2018    BILITOT 0 3 06/11/2018    EGFR 111 09/04/2018     No results found for: TSH    Patient voiced understanding and agreement in the above discussion  Aware to contact our office with questions/symptoms in the interim

## 2018-09-17 ENCOUNTER — HOSPITAL ENCOUNTER (OUTPATIENT)
Dept: INFUSION CENTER | Facility: HOSPITAL | Age: 63
Discharge: HOME | End: 2018-09-17
Payer: COMMERCIAL

## 2018-09-17 VITALS
HEART RATE: 101 BPM | WEIGHT: 164.9 LBS | RESPIRATION RATE: 18 BRPM | SYSTOLIC BLOOD PRESSURE: 135 MMHG | HEIGHT: 67 IN | DIASTOLIC BLOOD PRESSURE: 72 MMHG | TEMPERATURE: 97.6 F | BODY MASS INDEX: 25.88 KG/M2

## 2018-09-17 DIAGNOSIS — C19 RECTOSIGMOID JUNCTION CARCINOMA (HCC): ICD-10-CM

## 2018-09-17 LAB
ALBUMIN SERPL BCP-MCNC: 3.6 G/DL (ref 3–5.2)
ALP SERPL-CCNC: 72 U/L (ref 43–122)
ALT SERPL W P-5'-P-CCNC: 50 U/L (ref 9–52)
ANION GAP SERPL CALCULATED.3IONS-SCNC: 7 MMOL/L (ref 5–14)
ANISOCYTOSIS BLD QL SMEAR: PRESENT
AST SERPL W P-5'-P-CCNC: 49 U/L (ref 17–59)
BILIRUB SERPL-MCNC: 0.5 MG/DL
BUN SERPL-MCNC: 9 MG/DL (ref 5–25)
CALCIUM SERPL-MCNC: 8.8 MG/DL (ref 8.4–10.2)
CHLORIDE SERPL-SCNC: 106 MMOL/L (ref 97–108)
CO2 SERPL-SCNC: 26 MMOL/L (ref 22–30)
CREAT SERPL-MCNC: 0.75 MG/DL (ref 0.7–1.5)
ERYTHROCYTE [DISTWIDTH] IN BLOOD BY AUTOMATED COUNT: 23.7 %
GFR SERPL CREATININE-BSD FRML MDRD: 113 ML/MIN/1.73SQ M
GLUCOSE SERPL-MCNC: 155 MG/DL (ref 70–99)
HCT VFR BLD AUTO: 36.7 % (ref 41–53)
HGB BLD-MCNC: 11.9 G/DL (ref 13.5–17.5)
LYMPHOCYTES # BLD AUTO: 1.61 THOUSAND/UL (ref 0.5–4)
LYMPHOCYTES # BLD AUTO: 35 % (ref 20–50)
MAGNESIUM SERPL-MCNC: 2.2 MG/DL (ref 1.6–2.3)
MCH RBC QN AUTO: 27.3 PG (ref 26–34)
MCHC RBC AUTO-ENTMCNC: 32.5 G/DL (ref 31–36)
MCV RBC AUTO: 84 FL (ref 80–100)
MONOCYTES # BLD AUTO: 0.28 THOUSAND/UL (ref 0.2–0.9)
MONOCYTES NFR BLD AUTO: 6 % (ref 1–10)
MYELOCYTES NFR BLD MANUAL: 2 % (ref 0–1)
NEUTS SEG # BLD: 2.62 THOUSAND/UL (ref 1.8–7.8)
NEUTS SEG NFR BLD AUTO: 57 %
NRBC BLD AUTO-RTO: 1 /100 WBC (ref 0–2)
PLATELET # BLD AUTO: 201 THOUSANDS/UL (ref 150–450)
PLATELET BLD QL SMEAR: ADEQUATE
PMV BLD AUTO: 7.6 FL (ref 8.9–12.7)
POTASSIUM SERPL-SCNC: 3.5 MMOL/L (ref 3.6–5)
PROT SERPL-MCNC: 7 G/DL (ref 5.9–8.4)
RBC # BLD AUTO: 4.37 MILLION/UL (ref 4.5–5.9)
RBC MORPH BLD: ABNORMAL
SODIUM SERPL-SCNC: 139 MMOL/L (ref 137–147)
TOTAL CELLS COUNTED SPEC: 100
WBC # BLD AUTO: 4.6 THOUSAND/UL (ref 4.5–11)

## 2018-09-17 PROCEDURE — 96367 TX/PROPH/DG ADDL SEQ IV INF: CPT

## 2018-09-17 PROCEDURE — 96413 CHEMO IV INFUSION 1 HR: CPT

## 2018-09-17 PROCEDURE — 80053 COMPREHEN METABOLIC PANEL: CPT

## 2018-09-17 PROCEDURE — 96416 CHEMO PROLONG INFUSE W/PUMP: CPT

## 2018-09-17 PROCEDURE — 96368 THER/DIAG CONCURRENT INF: CPT

## 2018-09-17 PROCEDURE — 85007 BL SMEAR W/DIFF WBC COUNT: CPT

## 2018-09-17 PROCEDURE — 83735 ASSAY OF MAGNESIUM: CPT | Performed by: INTERNAL MEDICINE

## 2018-09-17 PROCEDURE — 96411 CHEMO IV PUSH ADDL DRUG: CPT

## 2018-09-17 PROCEDURE — 96372 THER/PROPH/DIAG INJ SC/IM: CPT

## 2018-09-17 PROCEDURE — 85027 COMPLETE CBC AUTOMATED: CPT

## 2018-09-17 PROCEDURE — 96415 CHEMO IV INFUSION ADDL HR: CPT

## 2018-09-17 RX ORDER — CYANOCOBALAMIN 1000 UG/ML
INJECTION INTRAMUSCULAR; SUBCUTANEOUS
Status: COMPLETED
Start: 2018-09-17 | End: 2018-09-17

## 2018-09-17 RX ADMIN — DEXAMETHASONE SODIUM PHOSPHATE: 10 INJECTION, SOLUTION INTRAMUSCULAR; INTRAVENOUS at 10:41

## 2018-09-17 RX ADMIN — LEUCOVORIN CALCIUM 752 MG: 350 INJECTION, POWDER, LYOPHILIZED, FOR SOLUTION INTRAMUSCULAR; INTRAVENOUS at 12:00

## 2018-09-17 RX ADMIN — DEXTROSE MONOHYDRATE 20 ML/HR: 50 INJECTION, SOLUTION INTRAVENOUS at 11:58

## 2018-09-17 RX ADMIN — CYANOCOBALAMIN 1000 MCG: 1000 INJECTION INTRAMUSCULAR; SUBCUTANEOUS at 09:08

## 2018-09-17 RX ADMIN — SODIUM CHLORIDE 20 ML/HR: 9 INJECTION, SOLUTION INTRAVENOUS at 14:05

## 2018-09-17 RX ADMIN — SODIUM CHLORIDE 20 ML/HR: 9 INJECTION, SOLUTION INTRAVENOUS at 08:50

## 2018-09-17 RX ADMIN — FLUOROURACIL 752 MG: 50 INJECTION, SOLUTION INTRAVENOUS at 14:09

## 2018-09-17 RX ADMIN — SODIUM CHLORIDE 150 MG: 9 INJECTION, SOLUTION INTRAVENOUS at 11:23

## 2018-09-17 RX ADMIN — DIPHENHYDRAMINE HYDROCHLORIDE 50 MG: 50 INJECTION, SOLUTION INTRAMUSCULAR; INTRAVENOUS at 11:01

## 2018-09-17 RX ADMIN — OXALIPLATIN 160 MG: 5 INJECTION, SOLUTION INTRAVENOUS at 12:05

## 2018-09-17 NOTE — PLAN OF CARE
Problem: Potential for Falls  Goal: Patient will remain free of falls  INTERVENTIONS:  - Assess patient frequently for physical needs  -  Identify cognitive and physical deficits and behaviors that affect risk of falls    -  [place] fall precautions as indicated by assessment   - Educate patient/family on patient safety including physical limitations  - Instruct patient to call for assistance with activity based on assessment  - Modify environment to reduce risk of injury  - Consider OT/PT consult to assist with strengthening/mobility   Outcome: Progressing

## 2018-09-17 NOTE — PROGRESS NOTES
Patient arrives to unit without complaints  Central labs drawn per order  Vit B12 given to right deltoid  Tolerated treatment well without complications  CADD pump connected and patient aware to return Wed at 1430  AVS given

## 2018-09-19 ENCOUNTER — HOSPITAL ENCOUNTER (OUTPATIENT)
Dept: INFUSION CENTER | Facility: HOSPITAL | Age: 63
Discharge: HOME | End: 2018-09-19
Payer: COMMERCIAL

## 2018-09-19 RX ADMIN — Medication 300 UNITS: at 12:27

## 2018-09-19 NOTE — PROGRESS NOTES
Patient arrives to unit for CADD pump disconnect  CADD disconnected, port flushed per protocol  Patient offers no new complaints  States he does have some dizziness, but states it's not as bad as it's been in the past   AVS given to patient and confirmed appts

## 2018-09-19 NOTE — PLAN OF CARE
Problem: Potential for Falls  Goal: Patient will remain free of falls  INTERVENTIONS:  - Assess patient frequently for physical needs  -  Identify cognitive and physical deficits and behaviors that affect risk of falls    -  [place] fall precautions as indicated by assessment   - Educate patient/family on patient safety including physical limitations  - Instruct patient to call for assistance with activity based on assessment  - Modify environment to reduce risk of injury  - Consider OT/PT consult to assist with strengthening/mobility   Outcome: Progressing

## 2018-09-27 RX ORDER — SODIUM CHLORIDE 9 MG/ML
20 INJECTION, SOLUTION INTRAVENOUS CONTINUOUS
Status: DISCONTINUED | OUTPATIENT
Start: 2018-09-28 | End: 2018-10-02 | Stop reason: HOSPADM

## 2018-09-28 ENCOUNTER — HOSPITAL ENCOUNTER (OUTPATIENT)
Dept: INFUSION CENTER | Facility: HOSPITAL | Age: 63
Discharge: HOME | End: 2018-09-28
Payer: COMMERCIAL

## 2018-09-28 ENCOUNTER — OFFICE VISIT (OUTPATIENT)
Dept: HEMATOLOGY ONCOLOGY | Facility: CLINIC | Age: 63
End: 2018-09-28
Payer: COMMERCIAL

## 2018-09-28 VITALS
HEIGHT: 67 IN | TEMPERATURE: 97.8 F | BODY MASS INDEX: 26.37 KG/M2 | SYSTOLIC BLOOD PRESSURE: 130 MMHG | WEIGHT: 168 LBS | DIASTOLIC BLOOD PRESSURE: 78 MMHG | OXYGEN SATURATION: 98 % | HEART RATE: 90 BPM | RESPIRATION RATE: 16 BRPM

## 2018-09-28 DIAGNOSIS — D51.3 OTHER DIETARY VITAMIN B12 DEFICIENCY ANEMIA: ICD-10-CM

## 2018-09-28 DIAGNOSIS — C19 RECTOSIGMOID JUNCTION CARCINOMA (HCC): Primary | Chronic | ICD-10-CM

## 2018-09-28 DIAGNOSIS — C19 RECTOSIGMOID JUNCTION CARCINOMA (HCC): ICD-10-CM

## 2018-09-28 LAB
ALBUMIN SERPL BCP-MCNC: 3.7 G/DL (ref 3–5.2)
ALP SERPL-CCNC: 83 U/L (ref 43–122)
ALT SERPL W P-5'-P-CCNC: 58 U/L (ref 9–52)
ANION GAP SERPL CALCULATED.3IONS-SCNC: 7 MMOL/L (ref 5–14)
AST SERPL W P-5'-P-CCNC: 47 U/L (ref 17–59)
BILIRUB SERPL-MCNC: 0.4 MG/DL
BUN SERPL-MCNC: 11 MG/DL (ref 5–25)
CALCIUM SERPL-MCNC: 8.8 MG/DL (ref 8.4–10.2)
CHLORIDE SERPL-SCNC: 106 MMOL/L (ref 97–108)
CO2 SERPL-SCNC: 27 MMOL/L (ref 22–30)
CREAT SERPL-MCNC: 0.69 MG/DL (ref 0.7–1.5)
EOSINOPHIL # BLD AUTO: 0.04 THOUSAND/UL (ref 0–0.4)
EOSINOPHIL NFR BLD MANUAL: 1 % (ref 0–6)
ERYTHROCYTE [DISTWIDTH] IN BLOOD BY AUTOMATED COUNT: 22.4 %
GFR SERPL CREATININE-BSD FRML MDRD: 117 ML/MIN/1.73SQ M
GLUCOSE SERPL-MCNC: 117 MG/DL (ref 70–99)
HCT VFR BLD AUTO: 37.3 % (ref 41–53)
HGB BLD-MCNC: 12.2 G/DL (ref 13.5–17.5)
LYMPHOCYTES # BLD AUTO: 1.85 THOUSAND/UL (ref 0.5–4)
LYMPHOCYTES # BLD AUTO: 42 % (ref 20–50)
MCH RBC QN AUTO: 27.7 PG (ref 26–34)
MCHC RBC AUTO-ENTMCNC: 32.6 G/DL (ref 31–36)
MCV RBC AUTO: 85 FL (ref 80–100)
MONOCYTES # BLD AUTO: 0.26 THOUSAND/UL (ref 0.2–0.9)
MONOCYTES NFR BLD AUTO: 6 % (ref 1–10)
NEUTS SEG # BLD: 2.24 THOUSAND/UL (ref 1.8–7.8)
NEUTS SEG NFR BLD AUTO: 51 %
PLATELET # BLD AUTO: 228 THOUSANDS/UL (ref 150–450)
PLATELET BLD QL SMEAR: ADEQUATE
PMV BLD AUTO: 7.7 FL (ref 8.9–12.7)
POTASSIUM SERPL-SCNC: 3.6 MMOL/L (ref 3.6–5)
PROT SERPL-MCNC: 6.7 G/DL (ref 5.9–8.4)
RBC # BLD AUTO: 4.38 MILLION/UL (ref 4.5–5.9)
RBC MORPH BLD: NORMAL
SODIUM SERPL-SCNC: 140 MMOL/L (ref 137–147)
TOTAL CELLS COUNTED SPEC: 100
WBC # BLD AUTO: 4.4 THOUSAND/UL (ref 4.5–11)

## 2018-09-28 PROCEDURE — 85007 BL SMEAR W/DIFF WBC COUNT: CPT

## 2018-09-28 PROCEDURE — 85027 COMPLETE CBC AUTOMATED: CPT

## 2018-09-28 PROCEDURE — 80053 COMPREHEN METABOLIC PANEL: CPT

## 2018-09-28 PROCEDURE — 99214 OFFICE O/P EST MOD 30 MIN: CPT | Performed by: INTERNAL MEDICINE

## 2018-09-28 RX ORDER — FOSAPREPITANT 150 MG/5ML
150 INJECTION, POWDER, LYOPHILIZED, FOR SOLUTION INTRAVENOUS ONCE
Status: DISCONTINUED | OUTPATIENT
Start: 2018-10-01 | End: 2018-10-01 | Stop reason: CLARIF

## 2018-09-28 RX ORDER — FLUOROURACIL 50 MG/ML
752 INJECTION, SOLUTION INTRAVENOUS ONCE
Status: COMPLETED | OUTPATIENT
Start: 2018-10-01 | End: 2018-10-01

## 2018-09-28 RX ORDER — SODIUM CHLORIDE 9 MG/ML
20 INJECTION, SOLUTION INTRAVENOUS ONCE
Status: COMPLETED | OUTPATIENT
Start: 2018-10-01 | End: 2018-10-01

## 2018-09-28 RX ORDER — HEPARIN SODIUM (PORCINE) LOCK FLUSH IV SOLN 100 UNIT/ML 100 UNIT/ML
300 SOLUTION INTRAVENOUS AS NEEDED
Status: ACTIVE | OUTPATIENT
Start: 2018-10-01 | End: 2018-10-02

## 2018-09-28 RX ADMIN — Medication 300 UNITS: at 11:41

## 2018-09-28 NOTE — PROGRESS NOTES
patient arrives for central labs  Port flushed per protocol and central labs drawn per order for chemo on Monday  Patient requested to have port stay accessed    Aware of appt Monday and declined AVS

## 2018-09-28 NOTE — PLAN OF CARE
Problem: Potential for Falls  Goal: Patient will remain free of falls  INTERVENTIONS:  - Assess patient frequently for physical needs  -  Identify cognitive and physical deficits and behaviors that affect risk of falls    -  [place] fall precautions as indicated by assessment   - Educate patient/family on patient safety including physical limitations  - Instruct patient to call for assistance with activity based on assessment  - Modify environment to reduce risk of injury  - Consider OT/PT consult to assist with strengthening/mobility   Outcome: Progressing

## 2018-09-28 NOTE — PROGRESS NOTES
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[address] )  The patient will receive adjuvant chemotherapy cycle 12  Of FOLFOX 6 next week which she tolerated extremely well  We will see him again in about 3-4 months from now for close follow-up  I will check his baseline CEA level post adjuvant chemotherapy and will discuss the removal of the Port-A-Cath at the next visit  For now we will continue to flush the port on every 6-8 week basis     we will follow the NCCN guidelines for close surveillance  _   CBC and differential; Future  - Comprehensive metabolic panel; Future  - Iron Panel; Future  - CEA; Future    2  Other dietary vitamin B12 deficiency anemia  He will be continue with the vitamin B12 injections on every 4 week basis until I see him next time  We will recheck his levels prior to next visit  - CBC and differential; Future  - Vitamin B12; Future  - Iron Panel; Future        HPI:  The patient came today for a follow-up visit  He is doing great on the adjuvant chemotherapy without significant complaints  He is due for his last chemotherapy cycle next week  Oncology History    Mr Bahman Camacho was diagnosed with rectosigmoid adenocarcinoma in March of 2018  He was in his usual state of health until he started to notice blood per rectum with each bowel movement for approximately 2 months  He had a colonoscopy February 27th 2018 which revealed a rectosigmoid junction mass  Biopsy revealed moderately differentiated adenocarcinoma  Staging CT of the chest abdomen and pelvis on March 5, 2018 showed no significant hand of any metastatic process in the liver  He did have 3-5 mm round ground-glass opacities within the periphery periphery of the right upper lobe of the lung, 3-6 month follow-up was recommended    CT also revealed focal narrowing of the mid sigmoid colon with associated wall thickening and mild prostatic hypertrophy and posterior bladder diverticuli  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[address] )    3/28/2018 Initial Diagnosis     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[address] )          Chemotherapy     1  Started FOLFOX6 cycle #1 May 1, 2018  Surgery     1  March 28, 2018 low anterior resection of the rectosigmoid sigmoid junction mass by Dr Ivery Peabody  Stage IIIc (T4, N2a, M0)            Interval history:    ROS: Review of Systems   Constitutional: Negative for appetite change, diaphoresis, fatigue and fever  HENT: Negative for congestion, dental problem, facial swelling, hearing loss, tinnitus and trouble swallowing  Eyes: Negative for visual disturbance  Respiratory: Negative for cough, chest tightness, shortness of breath and wheezing  Cardiovascular: Negative for chest pain and leg swelling  Gastrointestinal: Negative for abdominal distention, abdominal pain, blood in stool, constipation, diarrhea, nausea and vomiting  Genitourinary: Negative for dysuria, hematuria and urgency  Musculoskeletal: Negative for arthralgias, myalgias and neck pain  Skin: Negative  Negative for color change, pallor, rash and wound  Neurological: Negative for dizziness, weakness, numbness and headaches  Hematological: Negative for adenopathy  Psychiatric/Behavioral: Negative for agitation, behavioral problems, confusion, hallucinations, self-injury and sleep disturbance  The patient is not nervous/anxious and is not hyperactive          Past Medical History:   Diagnosis Date    [address] [address]     Depression     Hypertension     Psychiatric disorder        Past Surgical History:   Procedure Laterality Date    ABDOMINAL SURGERY      PORTACATH PLACEMENT Right        Social History     Social History    Marital status: Single     Spouse name: N/A    Number of children: N/A    Years of education: N/A     Social History Main Topics    Smoking status: Former Smoker    Smokeless tobacco: Never Used      Comment: quit 20 years ago     Alcohol use 1 2 - 1 8 oz/week     2 - 3 Cans of beer per week      Comment: weekly    Drug use: No    Sexual activity: Not on file     Other Topics Concern    Not on file     Social History Narrative    No narrative on file       Family History   Problem Relation Age of Onset    No Known Problems Mother     No Known Problems Father        No Known Allergies      Current Outpatient Prescriptions:     albuterol (PROAIR HFA) 90 mcg/act inhaler, Inhale 2 puffs every 6 (six) hours as needed for wheezing, Disp: 8 5 g, Rfl: 0    ARIPiprazole (ABILIFY) 20 MG tablet, TK 1 T PO QD , Disp: , Rfl: 2    ARTIFICIAL TEARS 1 4 % ophthalmic solution, USE 1 DROP IN BOTH EYES 3-4 TIMES PER DAY AS NEEDED, Disp: , Rfl: 0    aspirin (ECOTRIN LOW STRENGTH) 81 mg EC tablet, TK 1 T PO QD, Disp: , Rfl: 4    benztropine (COGENTIN) 0 5 mg tablet, every 24 hours, Disp: , Rfl:     clonazePAM (KlonoPIN) 2 mg tablet, TK 1 T PO HS PRN , Disp: , Rfl: 2    lidocaine-prilocaine (EMLA) cream, , Disp: , Rfl: 3    ondansetron (ZOFRAN) 8 mg tablet, Take 1 tablet (8 mg total) by mouth every 8 (eight) hours as needed for nausea or vomiting, Disp: 20 tablet, Rfl: 3    pantoprazole (PROTONIX) 40 mg tablet, TAKE 1 TABLET BY MOUTH DAILY, Disp: 30 tablet, Rfl: 2    Propylene Glycol-Glycerin 1-0 3 % SOLN, Use 3-4 times per day PRN, Disp: , Rfl:     atorvastatin (LIPITOR) 20 mg tablet, Take 1 tablet (20 mg total) by mouth daily for 30 days, Disp: 30 tablet, Rfl: 2    hydrochlorothiazide (MICROZIDE) 12 5 mg capsule, Take 1 capsule (12 5 mg total) by mouth every morning for 30 days, Disp: 30 capsule, Rfl: 2    lisinopril (ZESTRIL) 40 mg tablet, Take 1 tablet (40 mg total) by mouth daily for 30 days, Disp: 30 tablet, Rfl: 2  No current facility-administered medications for this visit       Facility-Administered Medications Ordered in Other Visits:     heparin lock flush 100 units/mL injection 300 Units, 300 Units, Intracatheter, PRN, To Barber MD    sodium chloride 0 9 % infusion, 20 mL/hr, Intravenous, Continuous, To Barber MD      Physical Exam:  /78 (BP Location: Left arm, Patient Position: Sitting, Cuff Size: Adult)   Pulse 90   Temp 97 8 °F (36 6 °C) (Tympanic)   Resp 16   Ht 5' 7" (1 702 m)   Wt 76 2 kg (168 lb)   SpO2 98%   BMI 26 31 kg/m²     Physical Exam   Constitutional: He is oriented to person, place, and time  He appears well-developed and well-nourished  HENT:   Head: Normocephalic and atraumatic  Nose: Nose normal    Mouth/Throat: Oropharynx is clear and moist    Eyes: Pupils are equal, round, and reactive to light  Conjunctivae and EOM are normal  Right eye exhibits no discharge  Left eye exhibits no discharge  No scleral icterus  Neck: Normal range of motion  Neck supple  No tracheal deviation present  No thyromegaly present  Cardiovascular: Normal rate, regular rhythm and normal heart sounds  Exam reveals no friction rub  No murmur heard  Pulmonary/Chest: Effort normal and breath sounds normal  No respiratory distress  He has no wheezes  He has no rales  He exhibits no tenderness  Abdominal: Soft  Bowel sounds are normal  He exhibits no distension and no mass  There is no hepatosplenomegaly, splenomegaly or hepatomegaly  There is no tenderness  There is no rebound and no guarding  Musculoskeletal: Normal range of motion  He exhibits no edema, tenderness or deformity  Lymphadenopathy:     He has no cervical adenopathy  Neurological: He is alert and oriented to person, place, and time  He has normal reflexes  No cranial nerve deficit  Coordination normal    Skin: Skin is warm and dry  No rash noted  No erythema  No pallor  Psychiatric: He has a normal mood and affect   His behavior is normal  Judgment and thought content normal          Labs:  Lab Results   Component Value Date    WBC 4 60 09/17/2018    HGB 11 9 (L) 09/17/2018    HCT 36 7 (L) 09/17/2018    MCV 84 09/17/2018     09/17/2018     Lab Results   Component Value Date     09/17/2018    K 3 5 (L) 09/17/2018     09/17/2018    CO2 26 09/17/2018    ANIONGAP 12 06/11/2018    BUN 9 09/17/2018    CREATININE 0 75 09/17/2018    GLUCOSE 101 (H) 06/11/2018    GLUF 85 04/30/2018    CALCIUM 8 8 09/17/2018    AST 49 09/17/2018    ALT 50 09/17/2018    ALKPHOS 72 09/17/2018    PROT 7 1 06/11/2018    BILITOT 0 3 06/11/2018    EGFR 113 09/17/2018     No results found for: TSH    Patient voiced understanding and agreement in the above discussion  Aware to contact our office with questions/symptoms in the interim

## 2018-10-01 ENCOUNTER — HOSPITAL ENCOUNTER (OUTPATIENT)
Dept: INFUSION CENTER | Facility: HOSPITAL | Age: 63
Discharge: HOME | End: 2018-10-01
Payer: COMMERCIAL

## 2018-10-01 VITALS
TEMPERATURE: 98.3 F | HEIGHT: 67 IN | OXYGEN SATURATION: 100 % | HEART RATE: 88 BPM | BODY MASS INDEX: 26.06 KG/M2 | DIASTOLIC BLOOD PRESSURE: 80 MMHG | RESPIRATION RATE: 16 BRPM | WEIGHT: 166.01 LBS | SYSTOLIC BLOOD PRESSURE: 128 MMHG

## 2018-10-01 PROCEDURE — 96411 CHEMO IV PUSH ADDL DRUG: CPT

## 2018-10-01 PROCEDURE — 96413 CHEMO IV INFUSION 1 HR: CPT

## 2018-10-01 PROCEDURE — 96368 THER/DIAG CONCURRENT INF: CPT

## 2018-10-01 PROCEDURE — 96367 TX/PROPH/DG ADDL SEQ IV INF: CPT

## 2018-10-01 PROCEDURE — 96415 CHEMO IV INFUSION ADDL HR: CPT

## 2018-10-01 PROCEDURE — G0498 CHEMO EXTEND IV INFUS W/PUMP: HCPCS

## 2018-10-01 RX ORDER — DEXTROSE MONOHYDRATE 50 MG/ML
20 INJECTION, SOLUTION INTRAVENOUS CONTINUOUS
Status: DISCONTINUED | OUTPATIENT
Start: 2018-10-01 | End: 2018-10-05 | Stop reason: HOSPADM

## 2018-10-01 RX ADMIN — DEXTROSE MONOHYDRATE 20 ML/HR: 50 INJECTION, SOLUTION INTRAVENOUS at 10:47

## 2018-10-01 RX ADMIN — FLUOROURACIL 752 MG: 50 INJECTION, SOLUTION INTRAVENOUS at 12:56

## 2018-10-01 RX ADMIN — DIPHENHYDRAMINE HYDROCHLORIDE 50 MG: 50 INJECTION, SOLUTION INTRAMUSCULAR; INTRAVENOUS at 09:06

## 2018-10-01 RX ADMIN — LEUCOVORIN CALCIUM 752 MG: 350 INJECTION, POWDER, LYOPHILIZED, FOR SOLUTION INTRAMUSCULAR; INTRAVENOUS at 10:47

## 2018-10-01 RX ADMIN — OXALIPLATIN 160 MG: 5 INJECTION, SOLUTION INTRAVENOUS at 10:48

## 2018-10-01 RX ADMIN — SODIUM CHLORIDE 20 ML/HR: 9 INJECTION, SOLUTION INTRAVENOUS at 08:33

## 2018-10-01 RX ADMIN — DEXAMETHASONE SODIUM PHOSPHATE: 10 INJECTION, SOLUTION INTRAMUSCULAR; INTRAVENOUS at 09:28

## 2018-10-01 RX ADMIN — SODIUM CHLORIDE 150 MG: 9 INJECTION, SOLUTION INTRAVENOUS at 09:58

## 2018-10-01 NOTE — PROGRESS NOTES
Premeds complete  Pt sleeping quietly when uninterupted  Leucovorin and oxaliplatin currently infusing

## 2018-10-01 NOTE — PROGRESS NOTES
Admitted to infusion dept for last folfox treatment  Feels "well"  No recent cough or fevers  Port remains accessed from Friday  Redressed to secure  Excellent blood return  Lab work from Friday reviewed

## 2018-10-01 NOTE — PROGRESS NOTES
Tolerated all meds without reaction  Excellent blood return before and after 5 fu push  Connected to cadd pump  All junctions secured with tape  All clamps taped in open position  Pump infusing with green light on  Pt aware of need to return wednsday at 1115  Pt Excited that this is his last scheduled treatment   Discharged ambulatory with avs

## 2018-10-02 RX ORDER — SODIUM CHLORIDE 9 MG/ML
20 INJECTION, SOLUTION INTRAVENOUS ONCE
Status: DISCONTINUED | OUTPATIENT
Start: 2018-10-03 | End: 2018-10-07 | Stop reason: HOSPADM

## 2018-10-02 RX ORDER — HEPARIN SODIUM (PORCINE) LOCK FLUSH IV SOLN 100 UNIT/ML 100 UNIT/ML
300 SOLUTION INTRAVENOUS AS NEEDED
Status: ACTIVE | OUTPATIENT
Start: 2018-10-03 | End: 2018-10-04

## 2018-10-03 ENCOUNTER — HOSPITAL ENCOUNTER (OUTPATIENT)
Dept: INFUSION CENTER | Facility: HOSPITAL | Age: 63
Discharge: HOME | End: 2018-10-03
Payer: COMMERCIAL

## 2018-10-03 RX ADMIN — HEPARIN SODIUM (PORCINE) LOCK FLUSH IV SOLN 100 UNIT/ML 300 UNITS: 100 SOLUTION at 11:46

## 2018-10-03 RX ADMIN — Medication 300 UNITS: at 11:46

## 2018-10-03 NOTE — PROGRESS NOTES
Patient arrives for CADD disconnect  CADD disconnected and port flushed per protocol  Patient stated today is his last day  Patient on schedule in 4 weeks for Vit B12 and port flush  Confirmed appt and AVS provided

## 2018-10-26 RX ORDER — CYANOCOBALAMIN 1000 UG/ML
1000 INJECTION INTRAMUSCULAR; SUBCUTANEOUS ONCE
Status: COMPLETED | OUTPATIENT
Start: 2018-10-27 | End: 2018-10-29

## 2018-10-29 ENCOUNTER — HOSPITAL ENCOUNTER (OUTPATIENT)
Dept: INFUSION CENTER | Facility: HOSPITAL | Age: 63
Discharge: HOME | End: 2018-10-29
Payer: COMMERCIAL

## 2018-10-29 PROCEDURE — 96372 THER/PROPH/DIAG INJ SC/IM: CPT

## 2018-10-29 RX ORDER — CYANOCOBALAMIN 1000 UG/ML
INJECTION INTRAMUSCULAR; SUBCUTANEOUS
Status: COMPLETED
Start: 2018-10-29 | End: 2018-10-29

## 2018-10-29 RX ORDER — HEPARIN SODIUM (PORCINE) LOCK FLUSH IV SOLN 100 UNIT/ML 100 UNIT/ML
300 SOLUTION INTRAVENOUS AS NEEDED
Status: DISCONTINUED | OUTPATIENT
Start: 2018-10-29 | End: 2018-11-02 | Stop reason: HOSPADM

## 2018-10-29 RX ADMIN — HEPARIN SODIUM (PORCINE) LOCK FLUSH IV SOLN 100 UNIT/ML 300 UNITS: 100 SOLUTION at 08:18

## 2018-10-29 RX ADMIN — Medication 300 UNITS: at 08:18

## 2018-10-29 RX ADMIN — CYANOCOBALAMIN 1000 MCG: 1000 INJECTION INTRAMUSCULAR; SUBCUTANEOUS at 08:08

## 2018-11-20 DIAGNOSIS — I10 HYPERTENSION, UNSPECIFIED TYPE: ICD-10-CM

## 2018-11-20 RX ORDER — LISINOPRIL 40 MG/1
TABLET ORAL
Qty: 30 TABLET | Refills: 0 | Status: SHIPPED | OUTPATIENT
Start: 2018-11-20 | End: 2019-02-06 | Stop reason: SDUPTHER

## 2018-11-23 RX ORDER — CYANOCOBALAMIN 1000 UG/ML
1000 INJECTION INTRAMUSCULAR; SUBCUTANEOUS ONCE
Status: COMPLETED | OUTPATIENT
Start: 2018-11-26 | End: 2018-11-26

## 2018-11-23 RX ORDER — SODIUM CHLORIDE 9 MG/ML
20 INJECTION, SOLUTION INTRAVENOUS CONTINUOUS
Status: DISPENSED | OUTPATIENT
Start: 2018-11-26 | End: 2018-11-27

## 2018-11-26 ENCOUNTER — HOSPITAL ENCOUNTER (OUTPATIENT)
Dept: INFUSION CENTER | Facility: HOSPITAL | Age: 63
Discharge: HOME | End: 2018-11-26
Payer: COMMERCIAL

## 2018-11-26 PROCEDURE — 96372 THER/PROPH/DIAG INJ SC/IM: CPT

## 2018-11-26 RX ADMIN — Medication 300 UNITS: at 08:01

## 2018-11-26 RX ADMIN — CYANOCOBALAMIN 1000 MCG: 1000 INJECTION INTRAMUSCULAR; SUBCUTANEOUS at 07:56

## 2018-11-26 NOTE — PROGRESS NOTES
Pt here for every 4 week Vit B12 injection, given to right deltoid IM and port flushed per protocol  Next appt made and appt card given

## 2018-12-21 RX ORDER — CYANOCOBALAMIN 1000 UG/ML
1000 INJECTION INTRAMUSCULAR; SUBCUTANEOUS ONCE
Status: COMPLETED | OUTPATIENT
Start: 2018-12-24 | End: 2018-12-24

## 2018-12-24 ENCOUNTER — HOSPITAL ENCOUNTER (OUTPATIENT)
Dept: INFUSION CENTER | Facility: HOSPITAL | Age: 63
Discharge: HOME | End: 2018-12-24
Payer: COMMERCIAL

## 2018-12-24 PROCEDURE — 96372 THER/PROPH/DIAG INJ SC/IM: CPT

## 2018-12-24 RX ADMIN — CYANOCOBALAMIN 1000 MCG: 1000 INJECTION INTRAMUSCULAR; SUBCUTANEOUS at 08:16

## 2018-12-24 RX ADMIN — Medication 300 UNITS: at 08:16

## 2018-12-24 NOTE — PROGRESS NOTES
Pt admitted to infusion center for routine port flush and vitamin b 12 injection  Tolerated same well  Good blood return from port   Discharged ambulatory with avs

## 2019-01-05 ENCOUNTER — APPOINTMENT (OUTPATIENT)
Dept: LAB | Facility: HOSPITAL | Age: 64
End: 2019-01-05
Payer: COMMERCIAL

## 2019-01-05 ENCOUNTER — TRANSCRIBE ORDERS (OUTPATIENT)
Dept: ADMINISTRATIVE | Facility: HOSPITAL | Age: 64
End: 2019-01-05

## 2019-01-05 DIAGNOSIS — Z79.899 HISTORY OF LONG-TERM TREATMENT WITH HIGH-RISK MEDICATION: ICD-10-CM

## 2019-01-05 DIAGNOSIS — C19 RECTOSIGMOID JUNCTION CARCINOMA (HCC): ICD-10-CM

## 2019-01-05 DIAGNOSIS — F33.1 MAJOR DEPRESSIVE DISORDER, RECURRENT EPISODE, MODERATE (HCC): Primary | ICD-10-CM

## 2019-01-05 DIAGNOSIS — D51.3 OTHER DIETARY VITAMIN B12 DEFICIENCY ANEMIA: ICD-10-CM

## 2019-01-05 DIAGNOSIS — F33.1 MAJOR DEPRESSIVE DISORDER, RECURRENT EPISODE, MODERATE (HCC): ICD-10-CM

## 2019-01-05 LAB
ALBUMIN SERPL BCP-MCNC: 4.3 G/DL (ref 3–5.2)
ALP SERPL-CCNC: 73 U/L (ref 43–122)
ALT SERPL W P-5'-P-CCNC: 45 U/L (ref 9–52)
ANION GAP SERPL CALCULATED.3IONS-SCNC: 7 MMOL/L (ref 5–14)
AST SERPL W P-5'-P-CCNC: 37 U/L (ref 17–59)
BASOPHILS # BLD AUTO: 0 THOUSANDS/ΜL (ref 0–0.1)
BASOPHILS NFR BLD AUTO: 0 % (ref 0–1)
BILIRUB SERPL-MCNC: 0.8 MG/DL
BUN SERPL-MCNC: 14 MG/DL (ref 5–25)
CALCIUM SERPL-MCNC: 9.2 MG/DL (ref 8.4–10.2)
CEA SERPL-MCNC: <0.5 NG/ML (ref 0–3)
CHLORIDE SERPL-SCNC: 100 MMOL/L (ref 97–108)
CHOLEST SERPL-MCNC: 251 MG/DL
CO2 SERPL-SCNC: 30 MMOL/L (ref 22–30)
CREAT SERPL-MCNC: 0.84 MG/DL (ref 0.7–1.5)
EOSINOPHIL # BLD AUTO: 0.1 THOUSAND/ΜL (ref 0–0.4)
EOSINOPHIL NFR BLD AUTO: 1 % (ref 0–6)
ERYTHROCYTE [DISTWIDTH] IN BLOOD BY AUTOMATED COUNT: 13.9 %
FERRITIN SERPL-MCNC: 127 NG/ML (ref 8–388)
GFR SERPL CREATININE-BSD FRML MDRD: 93 ML/MIN/1.73SQ M
GLUCOSE P FAST SERPL-MCNC: 104 MG/DL (ref 70–99)
HCT VFR BLD AUTO: 48.9 % (ref 41–53)
HDLC SERPL-MCNC: 53 MG/DL (ref 40–59)
HGB BLD-MCNC: 15.8 G/DL (ref 13.5–17.5)
IRON SATN MFR SERPL: 26 %
IRON SERPL-MCNC: 95 UG/DL (ref 65–175)
LDLC SERPL CALC-MCNC: 173 MG/DL
LYMPHOCYTES # BLD AUTO: 2.6 THOUSANDS/ΜL (ref 0.5–4)
LYMPHOCYTES NFR BLD AUTO: 35 % (ref 25–45)
MAGNESIUM SERPL-MCNC: 2.3 MG/DL (ref 1.6–2.3)
MCH RBC QN AUTO: 27.8 PG (ref 26–34)
MCHC RBC AUTO-ENTMCNC: 32.4 G/DL (ref 31–36)
MCV RBC AUTO: 86 FL (ref 80–100)
MONOCYTES # BLD AUTO: 0.5 THOUSAND/ΜL (ref 0.2–0.9)
MONOCYTES NFR BLD AUTO: 7 % (ref 1–10)
NEUTROPHILS # BLD AUTO: 4.2 THOUSANDS/ΜL (ref 1.8–7.8)
NEUTS SEG NFR BLD AUTO: 57 % (ref 45–65)
NONHDLC SERPL-MCNC: 198 MG/DL
PLATELET # BLD AUTO: 286 THOUSANDS/UL (ref 150–450)
PMV BLD AUTO: 9.1 FL (ref 8.9–12.7)
POTASSIUM SERPL-SCNC: 3.8 MMOL/L (ref 3.6–5)
PROT SERPL-MCNC: 7.8 G/DL (ref 5.9–8.4)
RBC # BLD AUTO: 5.71 MILLION/UL (ref 4.5–5.9)
SODIUM SERPL-SCNC: 137 MMOL/L (ref 137–147)
TIBC SERPL-MCNC: 367 UG/DL (ref 250–450)
TRIGL SERPL-MCNC: 127 MG/DL
TSH SERPL DL<=0.05 MIU/L-ACNC: 0.32 UIU/ML (ref 0.47–4.68)
VIT B12 SERPL-MCNC: 662 PG/ML (ref 100–900)
WBC # BLD AUTO: 7.4 THOUSAND/UL (ref 4.5–11)

## 2019-01-05 PROCEDURE — 82378 CARCINOEMBRYONIC ANTIGEN: CPT

## 2019-01-05 PROCEDURE — 80053 COMPREHEN METABOLIC PANEL: CPT | Performed by: NURSE PRACTITIONER

## 2019-01-05 PROCEDURE — 83735 ASSAY OF MAGNESIUM: CPT | Performed by: INTERNAL MEDICINE

## 2019-01-05 PROCEDURE — 83540 ASSAY OF IRON: CPT

## 2019-01-05 PROCEDURE — 84443 ASSAY THYROID STIM HORMONE: CPT

## 2019-01-05 PROCEDURE — 82607 VITAMIN B-12: CPT

## 2019-01-05 PROCEDURE — 83550 IRON BINDING TEST: CPT

## 2019-01-05 PROCEDURE — 82728 ASSAY OF FERRITIN: CPT

## 2019-01-05 PROCEDURE — 85025 COMPLETE CBC W/AUTO DIFF WBC: CPT | Performed by: NURSE PRACTITIONER

## 2019-01-05 PROCEDURE — 80061 LIPID PANEL: CPT

## 2019-01-05 PROCEDURE — 36415 COLL VENOUS BLD VENIPUNCTURE: CPT | Performed by: NURSE PRACTITIONER

## 2019-01-18 RX ORDER — CYANOCOBALAMIN 1000 UG/ML
1000 INJECTION INTRAMUSCULAR; SUBCUTANEOUS ONCE
Status: DISCONTINUED | OUTPATIENT
Start: 2019-01-21 | End: 2019-01-25 | Stop reason: HOSPADM

## 2019-01-21 ENCOUNTER — APPOINTMENT (OUTPATIENT)
Dept: LAB | Facility: HOSPITAL | Age: 64
End: 2019-01-21
Payer: COMMERCIAL

## 2019-01-21 ENCOUNTER — OFFICE VISIT (OUTPATIENT)
Dept: FAMILY MEDICINE CLINIC | Facility: CLINIC | Age: 64
End: 2019-01-21

## 2019-01-21 ENCOUNTER — HOSPITAL ENCOUNTER (OUTPATIENT)
Dept: INFUSION CENTER | Facility: HOSPITAL | Age: 64
Discharge: HOME | End: 2019-01-21
Payer: COMMERCIAL

## 2019-01-21 VITALS
DIASTOLIC BLOOD PRESSURE: 90 MMHG | SYSTOLIC BLOOD PRESSURE: 140 MMHG | TEMPERATURE: 96.8 F | WEIGHT: 170 LBS | RESPIRATION RATE: 15 BRPM | HEIGHT: 67 IN | BODY MASS INDEX: 26.68 KG/M2

## 2019-01-21 DIAGNOSIS — R20.0 NUMBNESS AND TINGLING IN BOTH HANDS: ICD-10-CM

## 2019-01-21 DIAGNOSIS — R20.2 NUMBNESS AND TINGLING IN BOTH HANDS: ICD-10-CM

## 2019-01-21 DIAGNOSIS — Z23 NEED FOR INFLUENZA VACCINATION: Primary | ICD-10-CM

## 2019-01-21 DIAGNOSIS — Z91.09 ENVIRONMENTAL ALLERGIES: ICD-10-CM

## 2019-01-21 LAB
ALBUMIN SERPL BCP-MCNC: 4.1 G/DL (ref 3–5.2)
ALP SERPL-CCNC: 72 U/L (ref 43–122)
ALT SERPL W P-5'-P-CCNC: 34 U/L (ref 9–52)
ANION GAP SERPL CALCULATED.3IONS-SCNC: 6 MMOL/L (ref 5–14)
AST SERPL W P-5'-P-CCNC: 32 U/L (ref 17–59)
BILIRUB SERPL-MCNC: 0.5 MG/DL
BUN SERPL-MCNC: 20 MG/DL (ref 5–25)
CALCIUM SERPL-MCNC: 9.3 MG/DL (ref 8.4–10.2)
CHLORIDE SERPL-SCNC: 103 MMOL/L (ref 97–108)
CO2 SERPL-SCNC: 30 MMOL/L (ref 22–30)
CREAT SERPL-MCNC: 0.98 MG/DL (ref 0.7–1.5)
EST. AVERAGE GLUCOSE BLD GHB EST-MCNC: 126 MG/DL
GFR SERPL CREATININE-BSD FRML MDRD: 82 ML/MIN/1.73SQ M
GLUCOSE P FAST SERPL-MCNC: 103 MG/DL (ref 70–99)
HBA1C MFR BLD: 6 % (ref 4.2–6.3)
MAGNESIUM SERPL-MCNC: 2.2 MG/DL (ref 1.6–2.3)
POTASSIUM SERPL-SCNC: 4.2 MMOL/L (ref 3.6–5)
PROT SERPL-MCNC: 7.5 G/DL (ref 5.9–8.4)
SODIUM SERPL-SCNC: 139 MMOL/L (ref 137–147)
TSH SERPL DL<=0.05 MIU/L-ACNC: 1.58 UIU/ML (ref 0.47–4.68)
VIT B12 SERPL-MCNC: 349 PG/ML (ref 100–900)

## 2019-01-21 PROCEDURE — 83036 HEMOGLOBIN GLYCOSYLATED A1C: CPT

## 2019-01-21 PROCEDURE — 80053 COMPREHEN METABOLIC PANEL: CPT

## 2019-01-21 PROCEDURE — 90471 IMMUNIZATION ADMIN: CPT | Performed by: FAMILY MEDICINE

## 2019-01-21 PROCEDURE — 82607 VITAMIN B-12: CPT

## 2019-01-21 PROCEDURE — 36415 COLL VENOUS BLD VENIPUNCTURE: CPT

## 2019-01-21 PROCEDURE — 84207 ASSAY OF VITAMIN B-6: CPT

## 2019-01-21 PROCEDURE — 90686 IIV4 VACC NO PRSV 0.5 ML IM: CPT | Performed by: FAMILY MEDICINE

## 2019-01-21 PROCEDURE — 84443 ASSAY THYROID STIM HORMONE: CPT

## 2019-01-21 PROCEDURE — 99213 OFFICE O/P EST LOW 20 MIN: CPT | Performed by: FAMILY MEDICINE

## 2019-01-21 PROCEDURE — 83735 ASSAY OF MAGNESIUM: CPT

## 2019-01-21 RX ORDER — LORATADINE 10 MG/1
10 TABLET ORAL DAILY
Qty: 30 TABLET | Refills: 2 | Status: SHIPPED | OUTPATIENT
Start: 2019-01-21 | End: 2019-03-21 | Stop reason: SDUPTHER

## 2019-01-21 NOTE — PROGRESS NOTES
Assessment/Plan:    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HCC)  S/P  low anterior resection of the rectosigmoid sigmoid junction mass by Dr Diane Potter  Stage IIIc (T4, N2a, M0) in March 28, 2018 He follows up with Oncology  Started  FOLFOX6 cycle #1 May 1, 2018  Currently he finished chemotherapy  Still have a port in which will remain in place for 1 year as he was told  He has an appointment with Oncology today to have the port cleaned    Numbness and tingling in both hands  Could be secondary for to vitamin B12 deficiency especially with recent chemotherapy  Will check also for diabetes and hypothyroidism especially that his recent TSH was mildly suppressed    Encounter for immunization  Will give flu vaccine today    Benign essential hypertension  Currently blood pressure is controlled at this time  Reviewed BP target goal with patient  Continue to maintain healthy balanced diet with focus on low salt intake  Limit alcohol intake  Environmental allergies  Will give Claritin       Diagnoses and all orders for this visit:    Need for influenza vaccination  -     SYRINGE/SINGLE-DOSE VIAL: influenza vaccine, 5908-0625, quadrivalent, 0 5 mL, preservative-free (AFLURIA, FLUARIX, FLULAVAL, FLUZONE)    Environmental allergies  -     loratadine (CLARITIN) 10 mg tablet; Take 1 tablet (10 mg total) by mouth daily    Numbness and tingling in both hands  -     TSH, 3rd generation with Free T4 reflex; Future  -     Vitamin B12; Future  -     HEMOGLOBIN A1C W/ EAG ESTIMATION; Future  -     Comprehensive metabolic panel; Future          Subjective:      Patient ID: Dionna Biswas is a 61 y o  male  HPI    Candy Kim is a 66-year-old pleasant but unfortunate male who was diagnosed with rectosigmoid cancer which was found on screening colonoscopy in March 2018  He had surgery and a finished chemotherapy recently   He denies any fever or chills  No chest pain or shortness of breath   However he is complaining of cramping, numbness, and tingling in both his hands and his feet  Also states his eyes are always red and itchy  Does not need refills for any of his medications      The following portions of the patient's history were reviewed and updated as appropriate: current medications, past medical history, past social history, past surgical history and problem list     Review of Systems   Constitutional: Negative for chills, fatigue and fever  HENT: Negative for ear discharge, sneezing and sore throat  Eyes: Negative for pain and visual disturbance  Respiratory: Negative for cough, chest tightness and shortness of breath  Cardiovascular: Negative for chest pain and palpitations  Gastrointestinal: Negative for abdominal distention, abdominal pain, blood in stool, diarrhea, nausea and vomiting  Genitourinary: Negative for difficulty urinating, dysuria and flank pain  Musculoskeletal: Negative for arthralgias and joint swelling  Skin: Negative for pallor and rash  Neurological: Positive for numbness  Negative for dizziness, syncope and headaches  Tingling and cramps   Hematological: Negative for adenopathy  Psychiatric/Behavioral: Negative for agitation and confusion  Objective:      /90 (BP Location: Right arm, Patient Position: Sitting, Cuff Size: Standard)   Temp (!) 96 8 °F (36 °C) (Temporal)   Resp 15   Ht 5' 7" (1 702 m)   Wt 77 1 kg (170 lb)   BMI 26 63 kg/m²          Physical Exam   Constitutional: He is oriented to person, place, and time  He appears well-developed and well-nourished  HENT:   Head: Normocephalic and atraumatic  Mouth/Throat: Oropharynx is clear and moist    Eyes: Right eye exhibits no discharge  Left eye exhibits no discharge  Right conjunctiva is injected  Left conjunctiva is injected  Right pupil is reactive  Left pupil is reactive  Neck: Normal range of motion  Neck supple  No JVD present  No tracheal deviation present  No thyromegaly present  Cardiovascular: Normal rate, regular rhythm and normal heart sounds  Exam reveals no friction rub  No murmur heard  Pulmonary/Chest: No respiratory distress  He has no wheezes  He has no rales  He exhibits no tenderness  Port is noted on the right side of the chest  no erythema   Abdominal: Soft  Bowel sounds are normal  He exhibits no distension  There is no tenderness  Surgical scar is noted   Musculoskeletal: Normal range of motion  He exhibits no deformity  Neurological: He is alert and oriented to person, place, and time  Skin: Skin is warm and dry  No rash noted  No erythema       port is noted on the right side of the chest  no erythema

## 2019-01-21 NOTE — ASSESSMENT & PLAN NOTE
S/P  low anterior resection of the rectosigmoid sigmoid junction mass by Dr Fischer Postal  Stage IIIc (T4, N2a, M0) in March 28, 2018 He follows up with Oncology  Started  FOLFOX6 cycle #1 May 1, 2018  Currently he finished chemotherapy  Still have a port in which will remain in place for 1 year as he was told    He has an appointment with Oncology today to have the port cleaned

## 2019-01-21 NOTE — ASSESSMENT & PLAN NOTE
Could be secondary for to vitamin B12 deficiency especially with recent chemotherapy    Will check also for diabetes and hypothyroidism especially that his recent TSH was mildly suppressed

## 2019-01-24 LAB — VIT B6 SERPL-MCNC: 13.2 UG/L (ref 5.3–46.7)

## 2019-02-06 DIAGNOSIS — I10 HYPERTENSION, UNSPECIFIED TYPE: ICD-10-CM

## 2019-02-07 RX ORDER — LISINOPRIL 40 MG/1
TABLET ORAL
Qty: 30 TABLET | Refills: 0 | Status: SHIPPED | OUTPATIENT
Start: 2019-02-07 | End: 2019-03-21 | Stop reason: SDUPTHER

## 2019-02-08 ENCOUNTER — OFFICE VISIT (OUTPATIENT)
Dept: HEMATOLOGY ONCOLOGY | Facility: CLINIC | Age: 64
End: 2019-02-08
Payer: COMMERCIAL

## 2019-02-08 ENCOUNTER — HOSPITAL ENCOUNTER (OUTPATIENT)
Dept: INFUSION CENTER | Facility: HOSPITAL | Age: 64
Discharge: HOME | End: 2019-02-08
Payer: COMMERCIAL

## 2019-02-08 VITALS
TEMPERATURE: 98.7 F | HEIGHT: 67 IN | RESPIRATION RATE: 16 BRPM | HEART RATE: 75 BPM | DIASTOLIC BLOOD PRESSURE: 80 MMHG | WEIGHT: 171 LBS | SYSTOLIC BLOOD PRESSURE: 140 MMHG | BODY MASS INDEX: 26.84 KG/M2 | OXYGEN SATURATION: 97 %

## 2019-02-08 DIAGNOSIS — D51.3 OTHER DIETARY VITAMIN B12 DEFICIENCY ANEMIA: Primary | ICD-10-CM

## 2019-02-08 DIAGNOSIS — C19 RECTOSIGMOID JUNCTION CARCINOMA (HCC): Primary | Chronic | ICD-10-CM

## 2019-02-08 PROCEDURE — 99214 OFFICE O/P EST MOD 30 MIN: CPT | Performed by: INTERNAL MEDICINE

## 2019-02-08 PROCEDURE — 96523 IRRIG DRUG DELIVERY DEVICE: CPT

## 2019-02-08 NOTE — PROGRESS NOTES
Pt tolerated port flush without issue  Excellent blood return noted  Discharged ambulatory with avs  Next appt march 22 2019 for port flush  Pt made aware

## 2019-02-08 NOTE — PROGRESS NOTES
Hematology/Oncology Outpatient Follow-up  Lorenza Leon 61 y o  male 1955 [id number]    Date:  2/8/2019        Assessment and Plan:  1  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[address] )  The patient has no clinical hint of recurrence of his colon cancer at least clinically  Would be appropriate to see him again in about 6 months from now with a CT scan of the chest abdomen pelvis for close surveillance  At the next visit we will discuss getting his Port-A-Cath removed pending the imaging studies in his overall condition  We will add vitamin B12 oral supplements instead injections to his treatment and check his vitamin B12 level prior to his next visit in 6 months from now  - CBC and differential; Future  - Comprehensive metabolic panel; Future  - Magnesium; Future  - CEA; Future  - CT chest abdomen pelvis w wo contrast; Future        HPI:  The patient came today for a follow-up visit  He is doing great after the adjuvant chemotherapy which he tolerated very well  His most recent blood work on the 5th of January showed normal CEA level his CBC and CMP are within normal ranges well  His vitamin B12 continues to be in the low-normal range of 349  Oncology History    Mr Whitley Leong was diagnosed with rectosigmoid adenocarcinoma in March of 2018  He was in his usual state of health until he started to notice blood per rectum with each bowel movement for approximately 2 months  He had a colonoscopy February 27th 2018 which revealed a rectosigmoid junction mass  Biopsy revealed moderately differentiated adenocarcinoma  Staging CT of the chest abdomen and pelvis on March 5, 2018 showed no significant hand of any metastatic process in the liver  He did have 3-5 mm round ground-glass opacities within the periphery periphery of the right upper lobe of the lung, 3-6 month follow-up was recommended    CT also revealed focal narrowing of the mid sigmoid colon with associated wall thickening and mild prostatic hypertrophy and posterior bladder diverticuli  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[facility] [address] )    3/28/2018 Initial Diagnosis     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[facility] [address] )  Stage IIIC         3/28/2018 Surgery      Low anterior resection of the rectosigmoid sigmoid junction mass by Dr Joseph Perales  Stage IIIc (T4, N2a, M0)         5/1/2018 - 10/1/2018 Chemotherapy     1  Adjuvant FOLFOX6   Completed 12 cycles            Interval history:    ROS: Review of Systems   Constitutional: Negative for appetite change, diaphoresis, fatigue and fever  HENT: Negative for congestion, dental problem, facial swelling, hearing loss, tinnitus and trouble swallowing  Eyes: Negative for visual disturbance  Respiratory: Negative for cough, chest tightness, shortness of breath and wheezing  Cardiovascular: Negative for chest pain and leg swelling  Gastrointestinal: Negative for abdominal distention, abdominal pain, blood in stool, constipation, diarrhea, nausea and vomiting  Genitourinary: Negative for dysuria, hematuria and urgency  Musculoskeletal: Negative for arthralgias, myalgias and neck pain  Skin: Negative  Negative for color change, pallor, rash and wound  Neurological: Negative for dizziness, weakness, numbness and headaches  Hematological: Negative for adenopathy  Psychiatric/Behavioral: Negative for agitation, behavioral problems, confusion, hallucinations, self-injury and sleep disturbance  The patient is not nervous/anxious and is not hyperactive          Past Medical History:   Diagnosis Date    Colon cancer ([facility] [address] )     Depression     Hypertension     Psychiatric disorder        Past Surgical History:   Procedure Laterality Date    ABDOMINAL SURGERY      PORTACATH PLACEMENT Right        Social History     Social History    Marital status: Single     Spouse name: N/A    Number of children: N/A    Years of education: N/A     Social History Main Topics    Smoking status: Former Smoker    Smokeless tobacco: Never Used      Comment: quit 20 years ago     Alcohol use 1 2 - 1 8 oz/week     2 - 3 Cans of beer per week      Comment: weekly    Drug use: No    Sexual activity: Not Asked     Other Topics Concern    None     Social History Narrative    None       Family History   Problem Relation Age of Onset    No Known Problems Mother     No Known Problems Father        No Known Allergies      Current Outpatient Prescriptions:     albuterol (PROAIR HFA) 90 mcg/act inhaler, Inhale 2 puffs every 6 (six) hours as needed for wheezing, Disp: 8 5 g, Rfl: 0    ARIPiprazole (ABILIFY) 20 MG tablet, TK 1 T PO QD , Disp: , Rfl: 2    ARTIFICIAL TEARS 1 4 % ophthalmic solution, USE 1 DROP IN BOTH EYES 3-4 TIMES PER DAY AS NEEDED, Disp: , Rfl: 0    aspirin (ECOTRIN LOW STRENGTH) 81 mg EC tablet, TK 1 T PO QD, Disp: , Rfl: 4    benztropine (COGENTIN) 0 5 mg tablet, every 24 hours, Disp: , Rfl:     clonazePAM (KlonoPIN) 2 mg tablet, TK 1 T PO HS PRN , Disp: , Rfl: 2    lidocaine-prilocaine (EMLA) cream, , Disp: , Rfl: 3    lisinopril (ZESTRIL) 40 mg tablet, TAKE 1 TABLET(40 MG) BY MOUTH DAILY, Disp: 30 tablet, Rfl: 0    loratadine (CLARITIN) 10 mg tablet, Take 1 tablet (10 mg total) by mouth daily, Disp: 30 tablet, Rfl: 2    ondansetron (ZOFRAN) 8 mg tablet, Take 1 tablet (8 mg total) by mouth every 8 (eight) hours as needed for nausea or vomiting, Disp: 20 tablet, Rfl: 3    pantoprazole (PROTONIX) 40 mg tablet, TAKE 1 TABLET BY MOUTH DAILY, Disp: 30 tablet, Rfl: 2    Propylene Glycol-Glycerin 1-0 3 % SOLN, Use 3-4 times per day PRN, Disp: , Rfl:     atorvastatin (LIPITOR) 20 mg tablet, Take 1 tablet (20 mg total) by mouth daily for 30 days, Disp: 30 tablet, Rfl: 2    hydrochlorothiazide (MICROZIDE) 12 5 mg capsule, Take 1 capsule (12 5 mg total) by mouth every morning for 30 days, Disp: 30 capsule, Rfl: 2      Physical Exam:  /80 (BP Location: Left arm, Cuff Size: Adult)   Pulse 75   Temp 98 7 °F (37 1 °C) (Tympanic) Resp 16   Ht 5' 7" (1 702 m)   Wt 77 6 kg (171 lb)   SpO2 97%   BMI 26 78 kg/m²     Physical Exam   Constitutional: He is oriented to person, place, and time  He appears well-developed and well-nourished  HENT:   Head: Normocephalic and atraumatic  Nose: Nose normal    Mouth/Throat: Oropharynx is clear and moist    Eyes: Pupils are equal, round, and reactive to light  Conjunctivae and EOM are normal  Right eye exhibits no discharge  Left eye exhibits no discharge  No scleral icterus  Neck: Normal range of motion  Neck supple  No tracheal deviation present  No thyromegaly present  Cardiovascular: Normal rate, regular rhythm and normal heart sounds  Exam reveals no friction rub  No murmur heard  Pulmonary/Chest: Effort normal and breath sounds normal  No respiratory distress  He has no wheezes  He has no rales  He exhibits no tenderness  Abdominal: Soft  Bowel sounds are normal  He exhibits no distension and no mass  There is no hepatosplenomegaly, splenomegaly or hepatomegaly  There is no tenderness  There is no rebound and no guarding  Musculoskeletal: Normal range of motion  He exhibits no edema, tenderness or deformity  Lymphadenopathy:     He has no cervical adenopathy  Neurological: He is alert and oriented to person, place, and time  He has normal reflexes  No cranial nerve deficit  Coordination normal    Skin: Skin is warm and dry  No rash noted  No erythema  No pallor  Psychiatric: He has a normal mood and affect   His behavior is normal  Judgment and thought content normal          Labs:  Lab Results   Component Value Date    WBC 7 40 01/05/2019    HGB 15 8 01/05/2019    HCT 48 9 01/05/2019    MCV 86 01/05/2019     01/05/2019     Lab Results   Component Value Date     06/11/2018    K 4 2 01/21/2019     01/21/2019    CO2 30 01/21/2019    ANIONGAP 12 06/11/2018    BUN 20 01/21/2019    CREATININE 0 98 01/21/2019    GLUCOSE 101 (H) 06/11/2018    GLUF 103 (H) 01/21/2019    CALCIUM 9 3 01/21/2019    AST 32 01/21/2019    ALT 34 01/21/2019    ALKPHOS 72 01/21/2019    PROT 7 1 06/11/2018    BILITOT 0 3 06/11/2018    EGFR 82 01/21/2019     No results found for: TSH    Patient voiced understanding and agreement in the above discussion  Aware to contact our office with questions/symptoms in the interim

## 2019-03-21 ENCOUNTER — OFFICE VISIT (OUTPATIENT)
Dept: FAMILY MEDICINE CLINIC | Facility: CLINIC | Age: 64
End: 2019-03-21

## 2019-03-21 VITALS
BODY MASS INDEX: 27.31 KG/M2 | HEIGHT: 67 IN | WEIGHT: 174 LBS | HEART RATE: 86 BPM | TEMPERATURE: 97.5 F | OXYGEN SATURATION: 98 % | DIASTOLIC BLOOD PRESSURE: 84 MMHG | RESPIRATION RATE: 16 BRPM | SYSTOLIC BLOOD PRESSURE: 140 MMHG

## 2019-03-21 DIAGNOSIS — Z51.11 ENCOUNTER FOR CHEMOTHERAPY MANAGEMENT: ICD-10-CM

## 2019-03-21 DIAGNOSIS — Z23 NEED FOR PNEUMOCOCCAL VACCINATION: Primary | ICD-10-CM

## 2019-03-21 DIAGNOSIS — I10 HYPERTENSION, UNSPECIFIED TYPE: ICD-10-CM

## 2019-03-21 DIAGNOSIS — Z91.09 ENVIRONMENTAL ALLERGIES: ICD-10-CM

## 2019-03-21 DIAGNOSIS — E78.5 HYPERLIPIDEMIA, UNSPECIFIED HYPERLIPIDEMIA TYPE: ICD-10-CM

## 2019-03-21 DIAGNOSIS — J45.909 UNCOMPLICATED ASTHMA, UNSPECIFIED ASTHMA SEVERITY, UNSPECIFIED WHETHER PERSISTENT: ICD-10-CM

## 2019-03-21 DIAGNOSIS — G62.9 NEUROPATHY: ICD-10-CM

## 2019-03-21 PROBLEM — H26.9 CATARACT: Status: ACTIVE | Noted: 2019-03-21

## 2019-03-21 PROCEDURE — 1036F TOBACCO NON-USER: CPT | Performed by: FAMILY MEDICINE

## 2019-03-21 PROCEDURE — 90732 PPSV23 VACC 2 YRS+ SUBQ/IM: CPT | Performed by: FAMILY MEDICINE

## 2019-03-21 PROCEDURE — 99213 OFFICE O/P EST LOW 20 MIN: CPT | Performed by: FAMILY MEDICINE

## 2019-03-21 PROCEDURE — 90471 IMMUNIZATION ADMIN: CPT | Performed by: FAMILY MEDICINE

## 2019-03-21 RX ORDER — ATORVASTATIN CALCIUM 20 MG/1
20 TABLET, FILM COATED ORAL DAILY
Qty: 30 TABLET | Refills: 2 | Status: SHIPPED | OUTPATIENT
Start: 2019-03-21 | End: 2021-03-23 | Stop reason: SDUPTHER

## 2019-03-21 RX ORDER — LISINOPRIL 40 MG/1
40 TABLET ORAL DAILY
Qty: 30 TABLET | Refills: 2 | Status: SHIPPED | OUTPATIENT
Start: 2019-03-21 | End: 2020-07-22 | Stop reason: SDUPTHER

## 2019-03-21 RX ORDER — OMEPRAZOLE 20 MG/1
20 CAPSULE, DELAYED RELEASE ORAL DAILY
Qty: 30 CAPSULE | Refills: 2 | Status: SHIPPED | OUTPATIENT
Start: 2019-03-21

## 2019-03-21 RX ORDER — LANOLIN ALCOHOL/MO/W.PET/CERES
1000 CREAM (GRAM) TOPICAL DAILY
Qty: 30 TABLET | Refills: 2 | Status: SHIPPED | OUTPATIENT
Start: 2019-03-21

## 2019-03-21 RX ORDER — HYDROCHLOROTHIAZIDE 12.5 MG/1
12.5 CAPSULE, GELATIN COATED ORAL EVERY MORNING
Qty: 30 CAPSULE | Refills: 2 | Status: SHIPPED | OUTPATIENT
Start: 2019-03-21 | End: 2020-07-22

## 2019-03-21 RX ORDER — LORATADINE 10 MG/1
10 TABLET ORAL DAILY
Qty: 30 TABLET | Refills: 2 | Status: SHIPPED | OUTPATIENT
Start: 2019-03-21

## 2019-03-21 RX ORDER — ASPIRIN 81 MG/1
81 TABLET ORAL DAILY
Qty: 30 TABLET | Refills: 2 | Status: SHIPPED | OUTPATIENT
Start: 2019-03-21

## 2019-03-21 RX ORDER — ALBUTEROL SULFATE 90 UG/1
2 AEROSOL, METERED RESPIRATORY (INHALATION) EVERY 6 HOURS PRN
Qty: 8.5 G | Refills: 0 | Status: SHIPPED | OUTPATIENT
Start: 2019-03-21 | End: 2019-12-05 | Stop reason: SDUPTHER

## 2019-03-21 NOTE — ASSESSMENT & PLAN NOTE
Vitamin B12 is on the lower side  Probably his neuropathy is secondary to this  Will replace with vitamin B12 and evaluate response    Another etiology could be secondary to chemotherapy that he received

## 2019-03-21 NOTE — ASSESSMENT & PLAN NOTE
S/P  low anterior resection of the rectosigmoid sigmoid junction mass by Dr Giovani Turner  Stage IIIc (T4, N2a, M0) in March 28, 2018 He follows up with Oncology  Started  FOLFOX6 cycle #1 May 1, 2018  He finished chemotherapy  Still have a port in which will remain in place for 1 year as he was told  He has an appointment with Oncology tomorrow to have the port cleaned

## 2019-03-21 NOTE — PROGRESS NOTES
Assessment/Plan:    Benign essential hypertension  Currently blood pressure is controlled at this time  Reviewed BP target goal with patient  Continue to maintain healthy balanced diet with focus on low salt intake  Limit alcohol intake  Schizophrenia ([address] )  Follows up with Psychiatry  Currently on Abilify Cogentin and Klonopin    Numbness and tingling in both hands  Vitamin B12 is on the lower side  Probably his neuropathy is secondary to this  Will replace with vitamin B12 and evaluate response  Another etiology could be secondary to chemotherapy that he received    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HCC)  S/P  low anterior resection of the rectosigmoid sigmoid junction mass by Dr Sophia Interiano  Stage IIIc (T4, N2a, M0) in March 28, 2018 He follows up with Oncology  Started  FOLFOX6 cycle #1 May 1, 2018  He finished chemotherapy  Still have a port in which will remain in place for 1 year as he was told  He has an appointment with Oncology tomorrow to have the port cleaned  Encounter for immunization  Patient is high risk due to cancer diagnosis and recent chemotherapy  Will give PPSV 23    Cataract  In the right eye  Encouraged to see Ophthalmology       Diagnoses and all orders for this visit:    Need for pneumococcal vaccination  -     PNEUMOCOCCAL POLYSACCHARIDE VACCINE 23-VALENT =>0RI SQ IM    Uncomplicated asthma, unspecified asthma severity, unspecified whether persistent  -     albuterol (PROAIR HFA) 90 mcg/act inhaler; Inhale 2 puffs every 6 (six) hours as needed for wheezing    Hyperlipidemia, unspecified hyperlipidemia type  -     atorvastatin (LIPITOR) 20 mg tablet; Take 1 tablet (20 mg total) by mouth daily for 30 days    Hypertension, unspecified type  -     aspirin (ECOTRIN LOW STRENGTH) 81 mg EC tablet; Take 1 tablet (81 mg total) by mouth daily  -     lisinopril (ZESTRIL) 40 mg tablet;  Take 1 tablet (40 mg total) by mouth daily  -     hydrochlorothiazide (MICROZIDE) 12 5 mg capsule; Take 1 capsule (12 5 mg total) by mouth every morning for 30 days    Environmental allergies  -     loratadine (CLARITIN) 10 mg tablet; Take 1 tablet (10 mg total) by mouth daily    Encounter for chemotherapy management  -     omeprazole (PriLOSEC) 20 mg delayed release capsule; Take 1 capsule (20 mg total) by mouth daily    Neuropathy  -     cyanocobalamin (VITAMIN B-12) 1,000 mcg tablet; Take 1 tablet (1,000 mcg total) by mouth daily          Subjective:      Patient ID: Verle Apgar is a 61 y o  male  Hand Pain    Associated symptoms include numbness  Pertinent negatives include no chest pain  Hemalatha Barton is a 79-year-old pleasant but unfortunate male who was diagnosed with rectosigmoid cancer which was found on screening colonoscopy in 2018  He had surgery and a finished chemotherapy recently   He is pleasant today as usual  He denies any fever or chills  No chest pain or shortness of breath  States that he is still having numbness and tingling in his feet and his hands   The following portions of the patient's history were reviewed and updated as appropriate: current medications, past medical history, past social history, past surgical history and problem list     Review of Systems   Constitutional: Negative for chills, fatigue and fever  HENT: Negative for ear discharge, sneezing and sore throat  Eyes: Negative for pain and visual disturbance  Respiratory: Negative for cough, chest tightness and shortness of breath  Cardiovascular: Negative for chest pain and palpitations  Gastrointestinal: Negative for abdominal distention, abdominal pain, blood in stool, diarrhea, nausea and vomiting  Genitourinary: Negative for difficulty urinating, dysuria and flank pain  Musculoskeletal: Negative for arthralgias and joint swelling  Skin: Negative for pallor and rash  Neurological: Positive for numbness  Negative for dizziness, syncope and headaches          Tingling in feet and hands   Hematological: Negative for adenopathy  Psychiatric/Behavioral: Negative for agitation and confusion  Objective:      /84 (BP Location: Right arm, Patient Position: Sitting, Cuff Size: Standard)   Pulse 86   Temp 97 5 °F (36 4 °C) (Temporal)   Resp 16   Ht 5' 7" (1 702 m)   Wt 78 9 kg (174 lb)   SpO2 98%   BMI 27 25 kg/m²          Physical Exam   Constitutional: He is oriented to person, place, and time  He appears well-developed and well-nourished  HENT:   Head: Normocephalic and atraumatic  Mouth/Throat: Oropharynx is clear and moist    Cataract is noted in the right eye   Eyes: Right eye exhibits no discharge  Left eye exhibits no discharge  Right conjunctiva is injected  Left conjunctiva is injected  Right pupil is reactive  Left pupil is reactive  Neck: Normal range of motion  Neck supple  No JVD present  No tracheal deviation present  No thyromegaly present  Cardiovascular: Normal rate, regular rhythm and normal heart sounds  Exam reveals no friction rub  No murmur heard  Pulmonary/Chest: No respiratory distress  He has no wheezes  He has no rales  He exhibits no tenderness  Port is noted on the right side of the chest  no erythema   Abdominal: Soft  Bowel sounds are normal  He exhibits no distension  There is no tenderness  Surgical scar is noted   Musculoskeletal: Normal range of motion  He exhibits no deformity  Neurological: He is alert and oriented to person, place, and time  Skin: Skin is warm and dry  No rash noted  No erythema       port is noted on the right side of the chest  no erythema

## 2019-03-22 ENCOUNTER — HOSPITAL ENCOUNTER (OUTPATIENT)
Dept: INFUSION CENTER | Facility: HOSPITAL | Age: 64
Discharge: HOME | End: 2019-03-22
Payer: COMMERCIAL

## 2019-03-22 PROCEDURE — 96523 IRRIG DRUG DELIVERY DEVICE: CPT

## 2019-06-18 ENCOUNTER — HOSPITAL ENCOUNTER (OUTPATIENT)
Dept: INFUSION CENTER | Facility: HOSPITAL | Age: 64
Discharge: HOME | End: 2019-06-18
Payer: COMMERCIAL

## 2019-06-18 DIAGNOSIS — C19 RECTOSIGMOID JUNCTION CARCINOMA (HCC): Primary | ICD-10-CM

## 2019-06-18 PROCEDURE — 96523 IRRIG DRUG DELIVERY DEVICE: CPT

## 2019-07-23 ENCOUNTER — HOSPITAL ENCOUNTER (OUTPATIENT)
Dept: INFUSION CENTER | Facility: HOSPITAL | Age: 64
Discharge: HOME | End: 2019-07-23

## 2019-08-02 ENCOUNTER — TELEPHONE (OUTPATIENT)
Dept: HEMATOLOGY ONCOLOGY | Facility: CLINIC | Age: 64
End: 2019-08-02

## 2019-08-02 NOTE — TELEPHONE ENCOUNTER
I called and left a voicemail asking the patient to give a call back and reschedule his appointment

## 2019-08-08 ENCOUNTER — HOSPITAL ENCOUNTER (OUTPATIENT)
Dept: CT IMAGING | Facility: HOSPITAL | Age: 64
Discharge: HOME | End: 2019-08-08
Attending: INTERNAL MEDICINE
Payer: COMMERCIAL

## 2019-08-08 DIAGNOSIS — C19 RECTOSIGMOID JUNCTION CARCINOMA (HCC): ICD-10-CM

## 2019-08-08 PROCEDURE — 74178 CT ABD&PLV WO CNTR FLWD CNTR: CPT

## 2019-08-08 PROCEDURE — 71270 CT THORAX DX C-/C+: CPT

## 2019-08-08 RX ADMIN — IOHEXOL 100 ML: 350 INJECTION, SOLUTION INTRAVENOUS at 09:48

## 2019-08-09 ENCOUNTER — HOSPITAL ENCOUNTER (OUTPATIENT)
Dept: INFUSION CENTER | Facility: HOSPITAL | Age: 64
Discharge: HOME | End: 2019-08-09
Payer: COMMERCIAL

## 2019-08-09 DIAGNOSIS — C19 RECTOSIGMOID JUNCTION CARCINOMA (HCC): Primary | ICD-10-CM

## 2019-08-09 PROCEDURE — 96523 IRRIG DRUG DELIVERY DEVICE: CPT

## 2019-08-14 ENCOUNTER — TELEPHONE (OUTPATIENT)
Dept: HEMATOLOGY ONCOLOGY | Facility: CLINIC | Age: 64
End: 2019-08-14

## 2019-09-18 ENCOUNTER — TELEPHONE (OUTPATIENT)
Dept: HEMATOLOGY ONCOLOGY | Facility: CLINIC | Age: 64
End: 2019-09-18

## 2019-09-18 NOTE — TELEPHONE ENCOUNTER
Called pt regarding missed f/u apt today 9/18/19 at 8:40 w/Dr Annette Mueller  Left message that pt needs to have labs completed, and to please call the office to reschedule missed f/u apt

## 2019-09-18 NOTE — TELEPHONE ENCOUNTER
Patient missed his appointment  I called to ask him if he wants to reschedule it  I left a voicemail

## 2019-10-28 ENCOUNTER — TRANSCRIBE ORDERS (OUTPATIENT)
Dept: ADMINISTRATIVE | Facility: HOSPITAL | Age: 64
End: 2019-10-28

## 2019-10-28 ENCOUNTER — APPOINTMENT (OUTPATIENT)
Dept: LAB | Facility: HOSPITAL | Age: 64
End: 2019-10-28
Payer: COMMERCIAL

## 2019-10-28 DIAGNOSIS — E88.89 MADELUNG'S NECK (HCC): ICD-10-CM

## 2019-10-28 DIAGNOSIS — Z79.899 ENCOUNTER FOR LONG-TERM (CURRENT) USE OF OTHER MEDICATIONS: Primary | ICD-10-CM

## 2019-10-28 DIAGNOSIS — Z79.899 ENCOUNTER FOR LONG-TERM (CURRENT) USE OF OTHER MEDICATIONS: ICD-10-CM

## 2019-10-28 LAB
ALBUMIN SERPL BCP-MCNC: 4.3 G/DL (ref 3–5.2)
ALP SERPL-CCNC: 61 U/L (ref 43–122)
ALT SERPL W P-5'-P-CCNC: 27 U/L (ref 9–52)
ANION GAP SERPL CALCULATED.3IONS-SCNC: 7 MMOL/L (ref 5–14)
AST SERPL W P-5'-P-CCNC: 27 U/L (ref 17–59)
BASOPHILS # BLD AUTO: 0.1 THOUSANDS/ΜL (ref 0–0.1)
BASOPHILS NFR BLD AUTO: 1 % (ref 0–1)
BILIRUB SERPL-MCNC: 0.6 MG/DL
BUN SERPL-MCNC: 13 MG/DL (ref 5–25)
CALCIUM SERPL-MCNC: 9.2 MG/DL (ref 8.4–10.2)
CHLORIDE SERPL-SCNC: 101 MMOL/L (ref 97–108)
CHOLEST SERPL-MCNC: 215 MG/DL
CO2 SERPL-SCNC: 31 MMOL/L (ref 22–30)
CREAT SERPL-MCNC: 1.07 MG/DL (ref 0.7–1.5)
EOSINOPHIL # BLD AUTO: 0 THOUSAND/ΜL (ref 0–0.4)
EOSINOPHIL NFR BLD AUTO: 1 % (ref 0–6)
ERYTHROCYTE [DISTWIDTH] IN BLOOD BY AUTOMATED COUNT: 14.2 %
GFR SERPL CREATININE-BSD FRML MDRD: 73 ML/MIN/1.73SQ M
GLUCOSE P FAST SERPL-MCNC: 117 MG/DL (ref 70–99)
HCT VFR BLD AUTO: 47.1 % (ref 41–53)
HDLC SERPL-MCNC: 58 MG/DL
HGB BLD-MCNC: 15.4 G/DL (ref 13.5–17.5)
LDLC SERPL CALC-MCNC: 125 MG/DL
LYMPHOCYTES # BLD AUTO: 2 THOUSANDS/ΜL (ref 0.5–4)
LYMPHOCYTES NFR BLD AUTO: 21 % (ref 25–45)
MCH RBC QN AUTO: 27.2 PG (ref 26–34)
MCHC RBC AUTO-ENTMCNC: 32.7 G/DL (ref 31–36)
MCV RBC AUTO: 83 FL (ref 80–100)
MONOCYTES # BLD AUTO: 0.6 THOUSAND/ΜL (ref 0.2–0.9)
MONOCYTES NFR BLD AUTO: 6 % (ref 1–10)
NEUTROPHILS # BLD AUTO: 7.2 THOUSANDS/ΜL (ref 1.8–7.8)
NEUTS SEG NFR BLD AUTO: 72 % (ref 45–65)
NONHDLC SERPL-MCNC: 157 MG/DL
PLATELET # BLD AUTO: 306 THOUSANDS/UL (ref 150–450)
PMV BLD AUTO: 8.7 FL (ref 8.9–12.7)
POTASSIUM SERPL-SCNC: 4.2 MMOL/L (ref 3.6–5)
PROT SERPL-MCNC: 7.7 G/DL (ref 5.9–8.4)
RBC # BLD AUTO: 5.67 MILLION/UL (ref 4.5–5.9)
SODIUM SERPL-SCNC: 139 MMOL/L (ref 137–147)
TRIGL SERPL-MCNC: 162 MG/DL
TSH SERPL DL<=0.05 MIU/L-ACNC: 0.24 UIU/ML (ref 0.47–4.68)
WBC # BLD AUTO: 9.9 THOUSAND/UL (ref 4.5–11)

## 2019-10-28 PROCEDURE — 84443 ASSAY THYROID STIM HORMONE: CPT

## 2019-10-28 PROCEDURE — 80053 COMPREHEN METABOLIC PANEL: CPT

## 2019-10-28 PROCEDURE — 36415 COLL VENOUS BLD VENIPUNCTURE: CPT

## 2019-10-28 PROCEDURE — 80061 LIPID PANEL: CPT

## 2019-10-28 PROCEDURE — 85025 COMPLETE CBC W/AUTO DIFF WBC: CPT

## 2019-12-05 ENCOUNTER — OFFICE VISIT (OUTPATIENT)
Dept: FAMILY MEDICINE CLINIC | Facility: CLINIC | Age: 64
End: 2019-12-05

## 2019-12-05 VITALS
HEART RATE: 88 BPM | RESPIRATION RATE: 18 BRPM | HEIGHT: 67 IN | SYSTOLIC BLOOD PRESSURE: 150 MMHG | DIASTOLIC BLOOD PRESSURE: 90 MMHG | WEIGHT: 180 LBS | BODY MASS INDEX: 28.25 KG/M2 | OXYGEN SATURATION: 97 % | TEMPERATURE: 97 F

## 2019-12-05 DIAGNOSIS — J45.40 MODERATE PERSISTENT ASTHMA WITHOUT COMPLICATION: ICD-10-CM

## 2019-12-05 DIAGNOSIS — R91.1 LUNG NODULE: ICD-10-CM

## 2019-12-05 DIAGNOSIS — J45.909 UNCOMPLICATED ASTHMA, UNSPECIFIED ASTHMA SEVERITY, UNSPECIFIED WHETHER PERSISTENT: ICD-10-CM

## 2019-12-05 DIAGNOSIS — Z23 ENCOUNTER FOR IMMUNIZATION: Primary | ICD-10-CM

## 2019-12-05 PROCEDURE — 3008F BODY MASS INDEX DOCD: CPT | Performed by: FAMILY MEDICINE

## 2019-12-05 PROCEDURE — 90682 RIV4 VACC RECOMBINANT DNA IM: CPT | Performed by: FAMILY MEDICINE

## 2019-12-05 PROCEDURE — 90471 IMMUNIZATION ADMIN: CPT | Performed by: FAMILY MEDICINE

## 2019-12-05 PROCEDURE — 1036F TOBACCO NON-USER: CPT | Performed by: FAMILY MEDICINE

## 2019-12-05 PROCEDURE — 99213 OFFICE O/P EST LOW 20 MIN: CPT | Performed by: FAMILY MEDICINE

## 2019-12-05 RX ORDER — CLONAZEPAM 1 MG/1
TABLET ORAL
Refills: 2 | COMMUNITY
Start: 2019-11-01

## 2019-12-05 RX ORDER — VENLAFAXINE HYDROCHLORIDE 75 MG/1
CAPSULE, EXTENDED RELEASE ORAL
Refills: 2 | COMMUNITY
Start: 2019-11-01

## 2019-12-05 RX ORDER — MIRTAZAPINE 30 MG/1
TABLET, FILM COATED ORAL
Refills: 2 | COMMUNITY
Start: 2019-11-01

## 2019-12-05 RX ORDER — ZOLPIDEM TARTRATE 10 MG/1
TABLET ORAL
Refills: 2 | COMMUNITY
Start: 2019-11-01

## 2019-12-05 RX ORDER — VENLAFAXINE HYDROCHLORIDE 150 MG/1
CAPSULE, EXTENDED RELEASE ORAL
Refills: 2 | COMMUNITY
Start: 2019-11-01

## 2019-12-05 NOTE — PROGRESS NOTES
Assessment/Plan: Moderate persistent asthma without complication  Currently not exacerbation  Symptoms are currently uncontrolled at this time  symptoms 2-3 times daily and responds to albuterol inhaler,    -start patient on Advair Diskus,  Called  pharmacy and made sure that but his insurance  - Avoid exposure to tobacco smoke, polluted air and other known asthma triggers  Monitor albuterol use to report back at follow up  Patient aware that increased albuterol use indicates poor control and may need further medication adjustment  Lung nodule  CT chest August 18, 2019 ordered and followed by Hematology Oncology shows:   No signs of metastatic disease to the chest, abdomen or pelvis      3 right apical pulmonary groundglass densities without solid components measuring 5 to 6 mm are stable since the 3/5/2018 study  Based on current Fleischner Society 2017 Guidelines on incidental pulmonary nodule, additional followup CT is recommended for   every 2 years until 5 years of stability is demonstrated  Diagnoses and all orders for this visit:    Encounter for immunization  -     influenza vaccine, 2415-2431, quadrivalent, recombinant, PF, 0 5 mL, for patients 18 yr+ (FLUBLOK)    Uncomplicated asthma, unspecified asthma severity, unspecified whether persistent  -     Discontinue: albuterol (PROAIR HFA) 90 mcg/act inhaler; Inhale 2 puffs every 6 (six) hours as needed for wheezing  -     albuterol (PROAIR HFA) 90 mcg/act inhaler; Inhale 2 puffs every 6 (six) hours as needed for wheezing    Moderate persistent asthma without complication  -     Discontinue: albuterol (PROAIR HFA) 90 mcg/act inhaler; Inhale 2 puffs every 6 (six) hours as needed for wheezing  -     Discontinue: fluticasone (FLOVENT DISKUS) 50 MCG/BLIST diskus inhaler; Inhale 1 puff 2 (two) times a day Please assure that this patient receives instructions and show him how to use this type of inhaler prior to leaving pharmacy    -     albuterol (PROAIR HFA) 90 mcg/act inhaler; Inhale 2 puffs every 6 (six) hours as needed for wheezing  -     Discontinue: fluticasone (FLOVENT DISKUS) 50 MCG/BLIST diskus inhaler; Inhale 1 puff 2 (two) times a day Please assure that this patient receives instructions and show him how to use this type of inhaler prior to leaving pharmacy  -     fluticasone (FLOVENT DISKUS) 50 MCG/BLIST diskus inhaler; Inhale 1 puff 2 (two) times a day    Lung nodule    Other orders  -     clonazePAM (KlonoPIN) 1 mg tablet; TK 1 T PO BID PRA  -     mirtazapine (REMERON) 30 mg tablet; TK 1 T PO HS  -     venlafaxine (EFFEXOR-XR) 150 mg 24 hr capsule; TK ONE C PO D  -     venlafaxine (EFFEXOR-XR) 75 mg 24 hr capsule; TK ONE C PO QD WF AND WITH VENLAFAXINE 150 MG  -     zolpidem (AMBIEN) 10 mg tablet; TK 1 T PO HS PRF SLEEP          Subjective:      Patient ID: Helder Zuñiga is a 59 y o  male  [place] is a 27-year-old pleasant man with significant PMH  Mild intermittent asthma, 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 presents today to follow-up on his at home and to obtain refills on his albuterol  Patient does report  2-3 times daily of asthma symptoms including cough shortness of breath and wheezing  Symptoms that resolves with albuterol  Last exacerbation with this morning and did resolve albuterol  Currently patient denies any shortness of breath wheezing or cough  He denies any fever or chills  No chest pain or shortness of breath  States that he is still having numbness and tingling in his feet and his hands        CT chest August 18, 2019 ordered and followed by Hematology Oncology shows:   No signs of metastatic disease to the chest, abdomen or pelvis      3 right apical pulmonary groundglass densities without solid components measuring 5 to 6 mm are stable since the 3/5/2018 study   Based on current Fleischner Society 2017 Guidelines on incidental pulmonary nodule, additional followup CT is recommended for   every 2 years until 5 years of stability is demonstrated  12/10/19 flush port cath    1/3/19 appt with [facility] oncology     Asthma   There is no chest tightness, cough, hoarse voice, shortness of breath, sputum production or wheezing  This is a chronic problem  Episode onset: daily uses pump 2-3 times per day  The problem occurs 2 to 4 times per day  The problem has been unchanged  Pertinent negatives include no appetite change, chest pain or fever  His symptoms are aggravated by animal exposure, exposure to smoke, change in weather, minimal activity and 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 ), Depression, Hypertension, and Psychiatric disorder  Patient Active Problem List    Diagnosis Date Noted    Lung nodule 12/06/2019    Cataract 03/21/2019    Encounter for immunization 01/21/2019    Numbness and tingling in both hands 01/21/2019    Environmental allergies 01/21/2019    [facility]ia ([facility] ) 08/06/2018    Dizziness 08/06/2018    Moderate persistent asthma without complication 65/28/8464    Hypercholesteremia 08/05/2018    GERD (gastroesophageal reflux disease) 08/05/2018    Other dietary vitamin B12 deficiency anemia 07/23/2018    Iron deficiency anemia 07/09/2018    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[facility] ) 06/25/2018    Benign essential hypertension 05/12/2017    Depressed mood 05/12/2017     He  has a past surgical history that includes Portacath placement (Right) and Abdominal surgery  His family history includes No Known Problems in his father and mother    He  reports that he has quit smoking  He has never used smokeless tobacco  He reports that he drinks about 2 0 - 3 0 standard drinks of alcohol per week  He reports that he does not use drugs  Current Outpatient Medications   Medication Sig Dispense Refill    albuterol (PROAIR HFA) 90 mcg/act inhaler Inhale 2 puffs every 6 (six) hours as needed for wheezing 2 Inhaler 3    ARIPiprazole (ABILIFY) 20 MG tablet TK 1 T PO QD   2    ARTIFICIAL TEARS 1 4 % ophthalmic solution USE 1 DROP IN BOTH EYES 3-4 TIMES PER DAY AS NEEDED  0    aspirin (ECOTRIN LOW STRENGTH) 81 mg EC tablet Take 1 tablet (81 mg total) by mouth daily 30 tablet 2    atorvastatin (LIPITOR) 20 mg tablet Take 1 tablet (20 mg total) by mouth daily for 30 days 30 tablet 2    benztropine (COGENTIN) 0 5 mg tablet every 24 hours      clonazePAM (KlonoPIN) 1 mg tablet TK 1 T PO BID PRA  2    clonazePAM (KlonoPIN) 2 mg tablet TK 1 T PO HS PRN    2    cyanocobalamin (VITAMIN B-12) 1,000 mcg tablet Take 1 tablet (1,000 mcg total) by mouth daily 30 tablet 2    fluticasone (FLOVENT DISKUS) 50 MCG/BLIST diskus inhaler Inhale 1 puff 2 (two) times a day 1 Inhaler 3    hydrochlorothiazide (MICROZIDE) 12 5 mg capsule Take 1 capsule (12 5 mg total) by mouth every morning for 30 days 30 capsule 2    lidocaine-prilocaine (EMLA) cream   3    lisinopril (ZESTRIL) 40 mg tablet Take 1 tablet (40 mg total) by mouth daily 30 tablet 2    loratadine (CLARITIN) 10 mg tablet Take 1 tablet (10 mg total) by mouth daily 30 tablet 2    mirtazapine (REMERON) 30 mg tablet TK 1 T PO HS  2    omeprazole (PriLOSEC) 20 mg delayed release capsule Take 1 capsule (20 mg total) by mouth daily 30 capsule 2    ondansetron (ZOFRAN) 8 mg tablet Take 1 tablet (8 mg total) by mouth every 8 (eight) hours as needed for nausea or vomiting 20 tablet 3    Propylene Glycol-Glycerin 1-0 3 % SOLN Use 3-4 times per day PRN      venlafaxine (EFFEXOR-XR) 150 mg 24 hr capsule TK ONE C PO D  2    venlafaxine (EFFEXOR-XR) 75 mg 24 hr capsule TK ONE C PO QD WF AND WITH VENLAFAXINE 150 MG  2    zolpidem (AMBIEN) 10 mg tablet TK 1 T PO HS PRF SLEEP  2     No current facility-administered medications for this visit  Current Outpatient Medications on File Prior to Visit   Medication Sig    ARIPiprazole (ABILIFY) 20 MG tablet TK 1 T PO QD     ARTIFICIAL TEARS 1 4 % ophthalmic solution USE 1 DROP IN BOTH EYES 3-4 TIMES PER DAY AS NEEDED    aspirin (ECOTRIN LOW STRENGTH) 81 mg EC tablet Take 1 tablet (81 mg total) by mouth daily    atorvastatin (LIPITOR) 20 mg tablet Take 1 tablet (20 mg total) by mouth daily for 30 days    benztropine (COGENTIN) 0 5 mg tablet every 24 hours    clonazePAM (KlonoPIN) 1 mg tablet TK 1 T PO BID PRA    clonazePAM (KlonoPIN) 2 mg tablet TK 1 T PO HS PRN   cyanocobalamin (VITAMIN B-12) 1,000 mcg tablet Take 1 tablet (1,000 mcg total) by mouth daily    hydrochlorothiazide (MICROZIDE) 12 5 mg capsule Take 1 capsule (12 5 mg total) by mouth every morning for 30 days    lidocaine-prilocaine (EMLA) cream     lisinopril (ZESTRIL) 40 mg tablet Take 1 tablet (40 mg total) by mouth daily    loratadine (CLARITIN) 10 mg tablet Take 1 tablet (10 mg total) by mouth daily    mirtazapine (REMERON) 30 mg tablet TK 1 T PO HS    omeprazole (PriLOSEC) 20 mg delayed release capsule Take 1 capsule (20 mg total) by mouth daily    ondansetron (ZOFRAN) 8 mg tablet Take 1 tablet (8 mg total) by mouth every 8 (eight) hours as needed for nausea or vomiting    Propylene Glycol-Glycerin 1-0 3 % SOLN Use 3-4 times per day PRN    venlafaxine (EFFEXOR-XR) 150 mg 24 hr capsule TK ONE C PO D    venlafaxine (EFFEXOR-XR) 75 mg 24 hr capsule TK ONE C PO QD WF AND WITH VENLAFAXINE 150 MG    zolpidem (AMBIEN) 10 mg tablet TK 1 T PO HS PRF SLEEP     No current facility-administered medications on file prior to visit  He has No Known Allergies       Review of Systems   Constitutional: Negative for appetite change, chills, diaphoresis, fatigue and fever  HENT: Negative  Negative for hoarse voice  Respiratory: Negative  Negative for apnea, cough, sputum production, choking, chest tightness, shortness of breath, wheezing and stridor  Reports wheezing and shortness of breath early this morning and responded to albuterol inhaler all as per HPI   Cardiovascular: Negative for chest pain, palpitations and leg swelling  Gastrointestinal: Negative  Musculoskeletal: Negative  Skin: Negative  Psychiatric/Behavioral: Negative  Objective:      /90 (BP Location: Left arm, Patient Position: Sitting, Cuff Size: Standard)   Pulse 88   Temp (!) 97 °F (36 1 °C) (Temporal)   Resp 18   Ht 5' 7" (1 702 m)   Wt 81 6 kg (180 lb)   SpO2 97%   BMI 28 19 kg/m²          Physical Exam   Constitutional: He is oriented to person, place, and time  He appears well-developed and well-nourished  No distress  HENT:   Head: Normocephalic and atraumatic  Mouth/Throat: Oropharynx is clear and moist and mucous membranes are normal  No oropharyngeal exudate  Eyes: Conjunctivae and EOM are normal  Right eye exhibits no discharge  Left eye exhibits no discharge  Neck: Normal range of motion  Neck supple  Cardiovascular: Normal rate, regular rhythm and normal heart sounds  Exam reveals no gallop and no friction rub  Pulmonary/Chest: Effort normal and breath sounds normal  No stridor  No respiratory distress  He has no wheezes  He has no rales  He exhibits no tenderness  Abdominal: Soft  Bowel sounds are normal  He exhibits no distension  There is no tenderness  There is no rebound and no guarding  Neurological: He is alert and oriented to person, place, and time  Skin: Skin is warm  Capillary refill takes less than 2 seconds  No rash noted  He is not diaphoretic  No erythema  Psychiatric: He has a normal mood and affect   His behavior is normal  Judgment and thought content normal    Vitals reviewed

## 2019-12-06 PROBLEM — J45.40 MODERATE PERSISTENT ASTHMA WITHOUT COMPLICATION: Status: ACTIVE | Noted: 2018-08-05

## 2019-12-06 PROBLEM — R91.1 LUNG NODULE: Status: ACTIVE | Noted: 2019-12-06

## 2019-12-06 RX ORDER — ALBUTEROL SULFATE 90 UG/1
2 AEROSOL, METERED RESPIRATORY (INHALATION) EVERY 6 HOURS PRN
Qty: 2 INHALER | Refills: 3 | Status: SHIPPED | OUTPATIENT
Start: 2019-12-06 | End: 2019-12-06 | Stop reason: SDUPTHER

## 2019-12-06 RX ORDER — ALBUTEROL SULFATE 90 UG/1
2 AEROSOL, METERED RESPIRATORY (INHALATION) EVERY 6 HOURS PRN
Qty: 2 INHALER | Refills: 3 | Status: SHIPPED | OUTPATIENT
Start: 2019-12-06 | End: 2020-02-25 | Stop reason: SDUPTHER

## 2019-12-06 NOTE — ASSESSMENT & PLAN NOTE
Currently not exacerbation  Symptoms are currently uncontrolled at this time  symptoms 2-3 times daily and responds to albuterol inhaler,    -start patient on Advair Diskus,  Called  pharmacy and made sure that but his insurance  - Avoid exposure to tobacco smoke, polluted air and other known asthma triggers  Monitor albuterol use to report back at follow up  Patient aware that increased albuterol use indicates poor control and may need further medication adjustment

## 2019-12-06 NOTE — ASSESSMENT & PLAN NOTE
CT chest August 18, 2019 ordered and followed by Hematology Oncology shows:   No signs of metastatic disease to the chest, abdomen or pelvis      3 right apical pulmonary groundglass densities without solid components measuring 5 to 6 mm are stable since the 3/5/2018 study  Based on current Fleischner Society 2017 Guidelines on incidental pulmonary nodule, additional followup CT is recommended for   every 2 years until 5 years of stability is demonstrated

## 2019-12-10 ENCOUNTER — HOSPITAL ENCOUNTER (OUTPATIENT)
Dept: INFUSION CENTER | Facility: HOSPITAL | Age: 64
Discharge: HOME | End: 2019-12-10
Payer: COMMERCIAL

## 2019-12-10 DIAGNOSIS — Z45.2 ENCOUNTER FOR CENTRAL LINE CARE: Primary | ICD-10-CM

## 2019-12-10 DIAGNOSIS — C19 RECTOSIGMOID JUNCTION CARCINOMA (HCC): ICD-10-CM

## 2019-12-10 PROCEDURE — 96523 IRRIG DRUG DELIVERY DEVICE: CPT

## 2020-01-03 ENCOUNTER — OFFICE VISIT (OUTPATIENT)
Dept: HEMATOLOGY ONCOLOGY | Facility: CLINIC | Age: 65
End: 2020-01-03
Payer: COMMERCIAL

## 2020-01-03 VITALS
SYSTOLIC BLOOD PRESSURE: 145 MMHG | OXYGEN SATURATION: 98 % | BODY MASS INDEX: 27.69 KG/M2 | HEART RATE: 73 BPM | DIASTOLIC BLOOD PRESSURE: 94 MMHG | RESPIRATION RATE: 18 BRPM | TEMPERATURE: 98 F | WEIGHT: 176.4 LBS | HEIGHT: 67 IN

## 2020-01-03 DIAGNOSIS — C19 RECTOSIGMOID JUNCTION CARCINOMA (HCC): Primary | ICD-10-CM

## 2020-01-03 PROCEDURE — 99214 OFFICE O/P EST MOD 30 MIN: CPT | Performed by: INTERNAL MEDICINE

## 2020-01-21 ENCOUNTER — HOSPITAL ENCOUNTER (OUTPATIENT)
Dept: INFUSION CENTER | Facility: HOSPITAL | Age: 65
Discharge: HOME | End: 2020-01-21
Payer: COMMERCIAL

## 2020-01-21 DIAGNOSIS — Z45.2 ENCOUNTER FOR CENTRAL LINE CARE: Primary | ICD-10-CM

## 2020-01-21 DIAGNOSIS — C19 RECTOSIGMOID JUNCTION CARCINOMA (HCC): ICD-10-CM

## 2020-01-21 PROCEDURE — 96523 IRRIG DRUG DELIVERY DEVICE: CPT

## 2020-01-21 NOTE — PLAN OF CARE
Problem: Knowledge Deficit  Goal: Patient/family/caregiver demonstrates understanding of disease process, treatment plan, medications, and discharge instructions  Description  Complete learning assessment and assess knowledge base  Interventions:  - Provide teaching at level of understanding  - Provide teaching via preferred learning methods  Outcome: Progressing     Problem: Knowledge Deficit  Goal: Patient/family/caregiver demonstrates understanding of disease process, treatment plan, medications, and discharge instructions  Description  Complete learning assessment and assess knowledge base    Interventions:  - Provide teaching at level of understanding  - Provide teaching via preferred learning methods  Outcome: Progressing

## 2020-01-21 NOTE — PROGRESS NOTES
Pt here for port flush  Denies any need for labs today  Port flushed per protocol  Made next appt and AVS provided

## 2020-02-25 ENCOUNTER — HOSPITAL ENCOUNTER (EMERGENCY)
Facility: HOSPITAL | Age: 65
Discharge: HOME | End: 2020-02-25
Attending: EMERGENCY MEDICINE
Payer: COMMERCIAL

## 2020-02-25 ENCOUNTER — APPOINTMENT (EMERGENCY)
Dept: CT IMAGING | Facility: HOSPITAL | Age: 65
End: 2020-02-25
Payer: COMMERCIAL

## 2020-02-25 VITALS
BODY MASS INDEX: 27.71 KG/M2 | HEART RATE: 77 BPM | RESPIRATION RATE: 20 BRPM | DIASTOLIC BLOOD PRESSURE: 82 MMHG | SYSTOLIC BLOOD PRESSURE: 138 MMHG | WEIGHT: 176.9 LBS | TEMPERATURE: 96.6 F | OXYGEN SATURATION: 96 %

## 2020-02-25 DIAGNOSIS — J45.40 MODERATE PERSISTENT ASTHMA WITHOUT COMPLICATION: ICD-10-CM

## 2020-02-25 DIAGNOSIS — J06.9 VIRAL URI WITH COUGH: Primary | ICD-10-CM

## 2020-02-25 DIAGNOSIS — J45.909 UNCOMPLICATED ASTHMA, UNSPECIFIED ASTHMA SEVERITY, UNSPECIFIED WHETHER PERSISTENT: ICD-10-CM

## 2020-02-25 LAB
ALBUMIN SERPL BCP-MCNC: 4 G/DL (ref 3–5.2)
ALP SERPL-CCNC: 56 U/L (ref 43–122)
ALT SERPL W P-5'-P-CCNC: 31 U/L (ref 9–52)
ANION GAP SERPL CALCULATED.3IONS-SCNC: 6 MMOL/L (ref 5–14)
AST SERPL W P-5'-P-CCNC: 22 U/L (ref 17–59)
ATRIAL RATE: 81 BPM
BASOPHILS # BLD AUTO: 0 THOUSANDS/ΜL (ref 0–0.1)
BASOPHILS NFR BLD AUTO: 1 % (ref 0–1)
BILIRUB SERPL-MCNC: 0.5 MG/DL
BUN SERPL-MCNC: 10 MG/DL (ref 5–25)
CALCIUM SERPL-MCNC: 8.9 MG/DL (ref 8.4–10.2)
CHLORIDE SERPL-SCNC: 104 MMOL/L (ref 97–108)
CO2 SERPL-SCNC: 29 MMOL/L (ref 22–30)
CREAT SERPL-MCNC: 0.86 MG/DL (ref 0.7–1.5)
EOSINOPHIL # BLD AUTO: 0.2 THOUSAND/ΜL (ref 0–0.4)
EOSINOPHIL NFR BLD AUTO: 3 % (ref 0–6)
ERYTHROCYTE [DISTWIDTH] IN BLOOD BY AUTOMATED COUNT: 14.3 %
GFR SERPL CREATININE-BSD FRML MDRD: 92 ML/MIN/1.73SQ M
GLUCOSE SERPL-MCNC: 97 MG/DL (ref 70–99)
HCT VFR BLD AUTO: 42.3 % (ref 41–53)
HGB BLD-MCNC: 13.9 G/DL (ref 13.5–17.5)
LYMPHOCYTES # BLD AUTO: 2 THOUSANDS/ΜL (ref 0.5–4)
LYMPHOCYTES NFR BLD AUTO: 30 % (ref 25–45)
MCH RBC QN AUTO: 26.7 PG (ref 26–34)
MCHC RBC AUTO-ENTMCNC: 32.9 G/DL (ref 31–36)
MCV RBC AUTO: 81 FL (ref 80–100)
MONOCYTES # BLD AUTO: 0.5 THOUSAND/ΜL (ref 0.2–0.9)
MONOCYTES NFR BLD AUTO: 7 % (ref 1–10)
NEUTROPHILS # BLD AUTO: 4.1 THOUSANDS/ΜL (ref 1.8–7.8)
NEUTS SEG NFR BLD AUTO: 60 % (ref 45–65)
P AXIS: 61 DEGREES
PLATELET # BLD AUTO: 362 THOUSANDS/UL (ref 150–450)
PMV BLD AUTO: 8.7 FL (ref 8.9–12.7)
POTASSIUM SERPL-SCNC: 4.1 MMOL/L (ref 3.6–5)
PR INTERVAL: 170 MS
PROT SERPL-MCNC: 7.3 G/DL (ref 5.9–8.4)
QRS AXIS: 31 DEGREES
QRSD INTERVAL: 90 MS
QT INTERVAL: 354 MS
QTC INTERVAL: 411 MS
RBC # BLD AUTO: 5.21 MILLION/UL (ref 4.5–5.9)
SODIUM SERPL-SCNC: 139 MMOL/L (ref 137–147)
T WAVE AXIS: 32 DEGREES
VENTRICULAR RATE: 81 BPM
WBC # BLD AUTO: 6.8 THOUSAND/UL (ref 4.5–11)

## 2020-02-25 PROCEDURE — 93010 ELECTROCARDIOGRAM REPORT: CPT | Performed by: INTERNAL MEDICINE

## 2020-02-25 PROCEDURE — 99285 EMERGENCY DEPT VISIT HI MDM: CPT | Performed by: EMERGENCY MEDICINE

## 2020-02-25 PROCEDURE — 93005 ELECTROCARDIOGRAM TRACING: CPT

## 2020-02-25 PROCEDURE — 85025 COMPLETE CBC W/AUTO DIFF WBC: CPT | Performed by: EMERGENCY MEDICINE

## 2020-02-25 PROCEDURE — 99284 EMERGENCY DEPT VISIT MOD MDM: CPT

## 2020-02-25 PROCEDURE — 94640 AIRWAY INHALATION TREATMENT: CPT

## 2020-02-25 PROCEDURE — 71275 CT ANGIOGRAPHY CHEST: CPT

## 2020-02-25 PROCEDURE — 36415 COLL VENOUS BLD VENIPUNCTURE: CPT | Performed by: EMERGENCY MEDICINE

## 2020-02-25 PROCEDURE — 80053 COMPREHEN METABOLIC PANEL: CPT | Performed by: EMERGENCY MEDICINE

## 2020-02-25 RX ORDER — DEXAMETHASONE 4 MG/1
12 TABLET ORAL ONCE
Qty: 3 TABLET | Refills: 0 | Status: SHIPPED | OUTPATIENT
Start: 2020-02-27 | End: 2020-02-27

## 2020-02-25 RX ORDER — IPRATROPIUM BROMIDE AND ALBUTEROL SULFATE 2.5; .5 MG/3ML; MG/3ML
3 SOLUTION RESPIRATORY (INHALATION)
Status: DISCONTINUED | OUTPATIENT
Start: 2020-02-25 | End: 2020-02-25 | Stop reason: HOSPADM

## 2020-02-25 RX ORDER — ALBUTEROL SULFATE 90 UG/1
2 AEROSOL, METERED RESPIRATORY (INHALATION) EVERY 6 HOURS PRN
Qty: 2 INHALER | Refills: 0 | Status: SHIPPED | OUTPATIENT
Start: 2020-02-25 | End: 2021-09-29 | Stop reason: SDUPTHER

## 2020-02-25 RX ADMIN — DEXAMETHASONE SODIUM PHOSPHATE 10 MG: 10 INJECTION, SOLUTION INTRAMUSCULAR; INTRAVENOUS at 09:51

## 2020-02-25 RX ADMIN — IPRATROPIUM BROMIDE AND ALBUTEROL SULFATE 3 ML: .5; 3 SOLUTION RESPIRATORY (INHALATION) at 09:52

## 2020-02-25 RX ADMIN — IOHEXOL 85 ML: 350 INJECTION, SOLUTION INTRAVENOUS at 11:53

## 2020-02-25 NOTE — ED PROVIDER NOTES
History  Chief Complaint   Patient presents with    Cough     productive cough x1 month, no meds taken for symptoms, has not seen PCP  pt denies fevers at home     Patient is a 77-year-old male who presents for concerns of 1 month of persistent cough is productive of clear sputum  States he has a history of asthma  No associated fevers or chills  Does have some mild shortness of breath with this as well  States he has been using his inhalers at home without any significant relief  Review of records show he has a history of colon cancer or undergoing resection and adjuvant therapy  Most recent screening tests show that he was not currently having a active cancer treatment as of January of this year  Prior to Admission Medications   Prescriptions Last Dose Informant Patient Reported? Taking? ARIPiprazole (ABILIFY) 20 MG tablet  Self Yes No   Sig: TK 1 T PO QD  ARTIFICIAL TEARS 1 4 % ophthalmic solution  Self Yes No   Sig: USE 1 DROP IN BOTH EYES 3-4 TIMES PER DAY AS NEEDED   albuterol (PROAIR HFA) 90 mcg/act inhaler   No No   Sig: Inhale 2 puffs every 6 (six) hours as needed for wheezing   albuterol (ProAir HFA) 90 mcg/act inhaler   No No   Sig: Inhale 2 puffs every 6 (six) hours as needed for wheezing   aspirin (ECOTRIN LOW STRENGTH) 81 mg EC tablet   No No   Sig: Take 1 tablet (81 mg total) by mouth daily   atorvastatin (LIPITOR) 20 mg tablet   No No   Sig: Take 1 tablet (20 mg total) by mouth daily for 30 days   benztropine (COGENTIN) 0 5 mg tablet  Self Yes No   Sig: every 24 hours   clonazePAM (KlonoPIN) 1 mg tablet   Yes No   Sig: TK 1 T PO BID PRA   clonazePAM (KlonoPIN) 2 mg tablet  Self Yes No   Sig: TK 1 T PO HS PRN     cyanocobalamin (VITAMIN B-12) 1,000 mcg tablet   No No   Sig: Take 1 tablet (1,000 mcg total) by mouth daily   fluticasone (FLOVENT DISKUS) 50 MCG/BLIST diskus inhaler   No No   Sig: Inhale 1 puff 2 (two) times a day   hydrochlorothiazide (MICROZIDE) 12 5 mg capsule   No No Sig: Take 1 capsule (12 5 mg total) by mouth every morning for 30 days   lidocaine-prilocaine (EMLA) cream  Self Yes No   lisinopril (ZESTRIL) 40 mg tablet   No No   Sig: Take 1 tablet (40 mg total) by mouth daily   loratadine (CLARITIN) 10 mg tablet   No No   Sig: Take 1 tablet (10 mg total) by mouth daily   mirtazapine (REMERON) 30 mg tablet   Yes No   Sig: TK 1 T PO HS   omeprazole (PriLOSEC) 20 mg delayed release capsule   No No   Sig: Take 1 capsule (20 mg total) by mouth daily   venlafaxine (EFFEXOR-XR) 150 mg 24 hr capsule   Yes No   Sig: TK ONE C PO D   venlafaxine (EFFEXOR-XR) 75 mg 24 hr capsule   Yes No   Sig: TK ONE C PO QD WF AND WITH VENLAFAXINE 150 MG   zolpidem (AMBIEN) 10 mg tablet   Yes No   Sig: TK 1 T PO HS PRF SLEEP      Facility-Administered Medications: None       Past Medical History:   Diagnosis Date    Colon cancer ([facility] [address] )     Depression     Hypertension     Psychiatric disorder        Past Surgical History:   Procedure Laterality Date    ABDOMINAL SURGERY      PORTACATH PLACEMENT Right        Family History   Problem Relation Age of Onset    No Known Problems Mother     No Known Problems Father      I have reviewed and agree with the history as documented  E-Cigarette/Vaping     E-Cigarette/Vaping Substances     Social History     Tobacco Use    Smoking status: Former Smoker    Smokeless tobacco: Never Used    Tobacco comment: quit 20 years ago    Substance Use Topics    Alcohol use: Yes     Alcohol/week: 2 0 - 3 0 standard drinks     Types: 2 - 3 Cans of beer per week     Comment: weekly    Drug use: No       Review of Systems   Constitutional: Negative  Negative for chills and fever  HENT: Negative  Negative for rhinorrhea, sore throat, trouble swallowing and voice change  Eyes: Negative  Negative for pain and visual disturbance  Respiratory: Positive for cough and shortness of breath  Negative for wheezing  Cardiovascular: Negative    Negative for chest pain and palpitations  Gastrointestinal: Negative for abdominal pain, diarrhea, nausea and vomiting  Genitourinary: Negative  Negative for dysuria and frequency  Musculoskeletal: Negative  Negative for neck pain and neck stiffness  Skin: Negative  Negative for rash  Neurological: Negative  Negative for dizziness, speech difficulty, weakness, light-headedness and numbness  Physical Exam  Physical Exam   Constitutional: He is oriented to person, place, and time  He appears well-developed and well-nourished  No distress  HENT:   Head: Normocephalic and atraumatic  Mouth/Throat: Oropharynx is clear and moist    Eyes: Pupils are equal, round, and reactive to light  Conjunctivae and EOM are normal    Neck: Normal range of motion  Neck supple  No tracheal deviation present  Cardiovascular: Normal rate, regular rhythm and intact distal pulses  Pulmonary/Chest: Effort normal and breath sounds normal  No accessory muscle usage or stridor  Tachypnea noted  No respiratory distress  He has no decreased breath sounds  He has no wheezes  He has no rales  Abdominal: Soft  Bowel sounds are normal  He exhibits no distension  There is no tenderness  There is no rebound and no guarding  Musculoskeletal: Normal range of motion  He exhibits no tenderness or deformity  Neurological: He is alert and oriented to person, place, and time  Skin: Skin is warm and dry  Capillary refill takes less than 2 seconds  No rash noted  Psychiatric: He has a normal mood and affect  His behavior is normal    Nursing note and vitals reviewed        Vital Signs  ED Triage Vitals   Temperature Pulse Respirations Blood Pressure SpO2   02/25/20 0912 02/25/20 0912 02/25/20 0912 02/25/20 0912 02/25/20 0912   (!) 96 6 °F (35 9 °C) 92 22 148/100 98 %      Temp Source Heart Rate Source Patient Position - Orthostatic VS BP Location FiO2 (%)   02/25/20 0912 02/25/20 0912 02/25/20 0912 02/25/20 0912 --   Tympanic Monitor Sitting Left arm Pain Score       02/25/20 1123       No Pain           Vitals:    02/25/20 0912 02/25/20 1123   BP: 148/100 138/82   Pulse: 92 77   Patient Position - Orthostatic VS: Sitting Lying         Visual Acuity      ED Medications  Medications   ipratropium-albuterol (DUO-NEB) 0 5-2 5 mg/3 mL inhalation solution 3 mL (3 mL Nebulization Given 2/25/20 0952)   dexamethasone 10 mg/mL oral liquid 10 mg 1 mL (10 mg Oral Given 2/25/20 0951)   iohexol (OMNIPAQUE) 350 MG/ML injection (MULTI-DOSE) 85 mL (85 mL Intravenous Given 2/25/20 1153)       Diagnostic Studies  Results Reviewed     Procedure Component Value Units Date/Time    CBC and differential [[id number]]  (Abnormal) Collected:  02/25/20 0946    Lab Status:  Final result Specimen:  Blood from Arm, Left Updated:  02/25/20 1039     WBC 6 80 Thousand/uL      RBC 5 21 Million/uL      Hemoglobin 13 9 g/dL      Hematocrit 42 3 %      MCV 81 fL      MCH 26 7 pg      MCHC 32 9 g/dL      RDW 14 3 %      MPV 8 7 fL      Platelets 393 Thousands/uL      Neutrophils Relative 60 %      Lymphocytes Relative 30 %      Monocytes Relative 7 %      Eosinophils Relative 3 %      Basophils Relative 1 %      Neutrophils Absolute 4 10 Thousands/µL      Lymphocytes Absolute 2 00 Thousands/µL      Monocytes Absolute 0 50 Thousand/µL      Eosinophils Absolute 0 20 Thousand/µL      Basophils Absolute 0 00 Thousands/µL     Comprehensive metabolic panel [[id number]]  (Normal) Collected:  02/25/20 0946    Lab Status:  Final result Specimen:  Blood from Arm, Left Updated:  02/25/20 1039     Sodium 139 mmol/L      Potassium 4 1 mmol/L      Chloride 104 mmol/L      CO2 29 mmol/L      ANION GAP 6 mmol/L      BUN 10 mg/dL      Creatinine 0 86 mg/dL      Glucose 97 mg/dL      Calcium 8 9 mg/dL      AST 22 U/L      ALT 31 U/L      Alkaline Phosphatase 56 U/L      Total Protein 7 3 g/dL      Albumin 4 0 g/dL      Total Bilirubin 0 50 mg/dL      eGFR 92 ml/min/1 73sq m     Narrative:       National Kidney Disease Foundation guidelines for Chronic Kidney Disease (CKD):     Stage 1 with normal or high GFR (GFR > 90 mL/min/1 73 square meters)    Stage 2 Mild CKD (GFR = 60-89 mL/min/1 73 square meters)    Stage 3A Moderate CKD (GFR = 45-59 mL/min/1 73 square meters)    Stage 3B Moderate CKD (GFR = 30-44 mL/min/1 73 square meters)    Stage 4 Severe CKD (GFR = 15-29 mL/min/1 73 square meters)    Stage 5 End Stage CKD (GFR <15 mL/min/1 73 square meters)  Note: GFR calculation is accurate only with a steady state creatinine                 CTA ED chest PE Study   Final Result by Merari Currie MD (02/25 1219)      No evidence for acute pulmonary embolus  No acute infiltrate  Stable small subtle groundglass nodules in the right upper lobe, exam somewhat limited due to beam hardening artifact through the upper lung zones related to arm positioning and streak  Workstation performed: [id number]                    Procedures  Procedures         ED Course  ED Course as of Feb 25 1319   Tue Feb 25, 2020   2933 Procedure Note: EKG  Date/Time: 02/25/20 9:49 AM   Performed by: Olya Lieberman  Authorized by: Olya Lieberman  ECG interpreted by me, the ED Provider: yes   The EKG demonstrates:  Rate 81  Rhythm sinus  QTc 411  No ST elevations/depressions                                      MDM  Number of Diagnoses or Management Options  Viral URI with cough:   Diagnosis management comments: Patient is a 58-year-old male who presented for persistent shortness of breath and cough  His initial cardiopulmonary evaluation was benign without tachycardia but does have very mild tachypnea  No wheezing rhonchi or rales were appreciated on his examination  Will treat for symptomatic management of possible mild asthma exacerbation  However, given his history of cancer, he is not low risk to have hypercoagulable state if he has had recurrent undiagnosed cancer    Will obtain a CTA of the chest to rule out a pulmonary embolism  Patient's CT scan was negative for acute pathology  Afebrile, no evidence of pneumonia  Patient can be discharged with symptomatic management  Reviewed the need for follow-up care strict return precautions  Amount and/or Complexity of Data Reviewed  Clinical lab tests: ordered and reviewed  Tests in the radiology section of CPT®: ordered and reviewed  Independent visualization of images, tracings, or specimens: yes          Disposition  Final diagnoses:   Viral URI with cough     Time reflects when diagnosis was documented in both MDM as applicable and the Disposition within this note     Time User Action Codes Description Comment    2/25/2020 12:39 PM Rudine Shuck Add [J06 9,  B97 89] Viral URI with cough     2/25/2020 12:40 PM Rudine Shuck Add [I61 332] Uncomplicated asthma, unspecified asthma severity, unspecified whether persistent     2/25/2020 12:40 PM Rudine Shuck Add [J45 40] Moderate persistent asthma without complication     5/18/4028 12:40 PM Rudine Shuck Modify [J45 40] Moderate persistent asthma without complication     8/44/3890 12:41 PM Rudine Shuck Modify [J45 40] Moderate persistent asthma without complication       ED Disposition     ED Disposition Condition Date/Time Comment    Discharge Stable Tue Feb 25, 2020 12:39 PM [facility] discharge to home/self care              Follow-up Information    None         Discharge Medication List as of 2/25/2020 12:41 PM      START taking these medications    Details   dexamethasone (DECADRON) 4 mg tablet Take 3 tablets (12 mg total) by mouth once for 1 dose, Starting Thu 2/27/2020, Normal         CONTINUE these medications which have CHANGED    Details   albuterol (ProAir HFA) 90 mcg/act inhaler Inhale 2 puffs every 6 (six) hours as needed for wheezing, Starting Tue 2/25/2020, Normal         CONTINUE these medications which have NOT CHANGED    Details   ARIPiprazole (ABILIFY) 20 MG tablet TK 1 T PO QD , Historical Med      ARTIFICIAL TEARS 1 4 % ophthalmic solution USE 1 DROP IN BOTH EYES 3-4 TIMES PER DAY AS NEEDED, Historical Med      aspirin (ECOTRIN LOW STRENGTH) 81 mg EC tablet Take 1 tablet (81 mg total) by mouth daily, Starting Thu 3/21/2019, Normal      atorvastatin (LIPITOR) 20 mg tablet Take 1 tablet (20 mg total) by mouth daily for 30 days, Starting Thu 3/21/2019, Until Sat 4/20/2019, Normal      benztropine (COGENTIN) 0 5 mg tablet every 24 hours, Historical Med      !! clonazePAM (KlonoPIN) 1 mg tablet TK 1 T PO BID PRA, Historical Med      !! clonazePAM (KlonoPIN) 2 mg tablet TK 1 T PO HS PRN  , Historical Med      cyanocobalamin (VITAMIN B-12) 1,000 mcg tablet Take 1 tablet (1,000 mcg total) by mouth daily, Starting Thu 3/21/2019, Normal      fluticasone (FLOVENT DISKUS) 50 MCG/BLIST diskus inhaler Inhale 1 puff 2 (two) times a day, Starting Fri 12/6/2019, Normal      hydrochlorothiazide (MICROZIDE) 12 5 mg capsule Take 1 capsule (12 5 mg total) by mouth every morning for 30 days, Starting Thu 3/21/2019, Until Sat 4/20/2019, Normal      lidocaine-prilocaine (EMLA) cream Starting Thu 4/26/2018, Historical Med      lisinopril (ZESTRIL) 40 mg tablet Take 1 tablet (40 mg total) by mouth daily, Starting Thu 3/21/2019, Normal      loratadine (CLARITIN) 10 mg tablet Take 1 tablet (10 mg total) by mouth daily, Starting Thu 3/21/2019, Normal      mirtazapine (REMERON) 30 mg tablet TK 1 T PO HS, Historical Med      omeprazole (PriLOSEC) 20 mg delayed release capsule Take 1 capsule (20 mg total) by mouth daily, Starting Thu 3/21/2019, Normal      !! venlafaxine (EFFEXOR-XR) 150 mg 24 hr capsule TK ONE C PO D, Historical Med      !! venlafaxine (EFFEXOR-XR) 75 mg 24 hr capsule TK ONE C PO QD WF AND WITH VENLAFAXINE 150 MG, Historical Med      zolpidem (AMBIEN) 10 mg tablet TK 1 T PO HS PRF SLEEP, Historical Med       !! - Potential duplicate medications found  Please discuss with provider          No discharge procedures on file     PDMP Review     None          ED Provider  Electronically Signed by           Rory Lowery DO  02/25/20 2748

## 2020-07-06 ENCOUNTER — TELEPHONE (OUTPATIENT)
Dept: HEMATOLOGY ONCOLOGY | Facility: CLINIC | Age: 65
End: 2020-07-06

## 2020-07-06 NOTE — TELEPHONE ENCOUNTER
I tried calling the patient to remind him to have his lab work done prior to his appt on on  7/10/2020  His VM was full

## 2020-07-09 ENCOUNTER — TELEPHONE (OUTPATIENT)
Dept: HEMATOLOGY ONCOLOGY | Facility: CLINIC | Age: 65
End: 2020-07-09

## 2020-07-09 ENCOUNTER — APPOINTMENT (OUTPATIENT)
Dept: LAB | Facility: HOSPITAL | Age: 65
End: 2020-07-09
Attending: INTERNAL MEDICINE
Payer: COMMERCIAL

## 2020-07-09 DIAGNOSIS — C19 RECTOSIGMOID JUNCTION CARCINOMA (HCC): ICD-10-CM

## 2020-07-09 LAB
ALBUMIN SERPL BCP-MCNC: 4.2 G/DL (ref 3–5.2)
ALP SERPL-CCNC: 61 U/L (ref 43–122)
ALT SERPL W P-5'-P-CCNC: 44 U/L (ref 9–52)
ANION GAP SERPL CALCULATED.3IONS-SCNC: 8 MMOL/L (ref 5–14)
AST SERPL W P-5'-P-CCNC: 38 U/L (ref 17–59)
BILIRUB SERPL-MCNC: 0.5 MG/DL
BUN SERPL-MCNC: 16 MG/DL (ref 5–25)
CALCIUM SERPL-MCNC: 9 MG/DL (ref 8.4–10.2)
CEA SERPL-MCNC: 0.8 NG/ML (ref 0–3)
CHLORIDE SERPL-SCNC: 103 MMOL/L (ref 97–108)
CO2 SERPL-SCNC: 28 MMOL/L (ref 22–30)
CREAT SERPL-MCNC: 0.85 MG/DL (ref 0.7–1.5)
GFR SERPL CREATININE-BSD FRML MDRD: 92 ML/MIN/1.73SQ M
GLUCOSE P FAST SERPL-MCNC: 93 MG/DL (ref 70–99)
MAGNESIUM SERPL-MCNC: 2.3 MG/DL (ref 1.6–2.3)
POTASSIUM SERPL-SCNC: 3.9 MMOL/L (ref 3.6–5)
PROT SERPL-MCNC: 7.1 G/DL (ref 5.9–8.4)
SODIUM SERPL-SCNC: 139 MMOL/L (ref 137–147)

## 2020-07-09 PROCEDURE — 36415 COLL VENOUS BLD VENIPUNCTURE: CPT

## 2020-07-09 PROCEDURE — 83735 ASSAY OF MAGNESIUM: CPT

## 2020-07-09 PROCEDURE — 82378 CARCINOEMBRYONIC ANTIGEN: CPT

## 2020-07-09 PROCEDURE — 80053 COMPREHEN METABOLIC PANEL: CPT

## 2020-07-09 NOTE — TELEPHONE ENCOUNTER
Spoke to patient and informed him that his on 7/10/20 needs to be R/S due to Dr Felecia Duenas not being in the office  Patient was R/S to 7/31/20 at 3:20 am with Dr Felecia Duenas at the [facility] location  Patient was fine with the appt change

## 2020-07-22 ENCOUNTER — OFFICE VISIT (OUTPATIENT)
Dept: FAMILY MEDICINE CLINIC | Facility: CLINIC | Age: 65
End: 2020-07-22

## 2020-07-22 VITALS
SYSTOLIC BLOOD PRESSURE: 162 MMHG | RESPIRATION RATE: 18 BRPM | OXYGEN SATURATION: 97 % | HEART RATE: 86 BPM | WEIGHT: 180 LBS | BODY MASS INDEX: 28.25 KG/M2 | HEIGHT: 67 IN | TEMPERATURE: 97.5 F | DIASTOLIC BLOOD PRESSURE: 100 MMHG

## 2020-07-22 DIAGNOSIS — I10 BENIGN ESSENTIAL HYPERTENSION: Primary | ICD-10-CM

## 2020-07-22 DIAGNOSIS — I10 HYPERTENSION, UNSPECIFIED TYPE: ICD-10-CM

## 2020-07-22 PROCEDURE — 99213 OFFICE O/P EST LOW 20 MIN: CPT | Performed by: FAMILY MEDICINE

## 2020-07-22 PROCEDURE — 3077F SYST BP >= 140 MM HG: CPT | Performed by: FAMILY MEDICINE

## 2020-07-22 PROCEDURE — 3080F DIAST BP >= 90 MM HG: CPT | Performed by: FAMILY MEDICINE

## 2020-07-22 PROCEDURE — 1036F TOBACCO NON-USER: CPT | Performed by: FAMILY MEDICINE

## 2020-07-22 RX ORDER — LISINOPRIL 40 MG/1
40 TABLET ORAL DAILY
Qty: 30 TABLET | Refills: 2 | Status: SHIPPED | OUTPATIENT
Start: 2020-07-22 | End: 2020-10-29

## 2020-07-22 NOTE — PATIENT INSTRUCTIONS
Dieta saludable para el corazón   LO QUE NECESITA SABER:   Lott Stakes tae para el corazón es un plan alimenticio bajo en grasas totales, grasas no saludables y sodio (sal)  Lott [employer]akes saludable para el corazón ayuda a disminuir [place] riesgo de sufrir de enfermedades del [place] y un derrame cerebral  Limite la cantidad de grasa que consume entre un 25% a 35% del total de thelma calorías diarias  Limite el sodio por debajo de los 2,300 mg al día  [address]:   Grasas saludables:  Las grasas saludables ayudan a mejorar los niveles de [place]  El riesgo de yovani enfermedad cardíaca se disminuye cuando los niveles de colesterol son normales  Escoja grasas saludables derik las siguientes:  · Las grasas no saturadas  se encuentran en alimentos derik el frijol de soja, aceites de [place], de [place], de [place] y de [place]  Se encuentra también en la margarina suave hecha con aceite líquido vegetal      · Las grasas Omega 3  se encuentran en ciertos pescados, derik el salmón, el atún y la [place], en las nueces y en la semilla de james  Grasas no saludables:  Las grasas "malas" pueden causar niveles perjudiciales de colesterol en [place] norris y aumentar el riesgo de yovani enfermedad cardíaca  Limite el consumo de grasas no saludables, derik los siguientes:  · El colesterol  se encuentra en alimentos de origen animal, derik los huevos y la langosta al igual que en productos lácteos hechos con leche entera  Limite el consumo de colesterol a menos de 300 milligramos (mg) al día  Es posible que necesite limitar el colesterol a 200 mg al día si sufre de yovani enfermedad cardíaca  · Las grasas saturadas  se encuentran en yolanda derik el tocino y la hamburguesa  Estas se encuentran también en la piel del german y del [place], [place] entera y [place]  Limite el consumo de grasas saturadas a menos del 7% del total de thelma calorías diarias   Limite las grasas saturadas a menos del 6% si usted tiene enfermedad cardíaca o tiene un mayor riesgo para esto  · La grasa trans  se encuentran en los alimentos envasados, derik las papitas de bolsa y las [place]  También se encuentra en la margarina dura, algunos alimentos fritos y la manteca  Evite lo más que [employer] trans  Alimentos y bebidas saludables para el corazón para incluir:  Solicite que mansfield nutricionista o el médico le indique cuántas porciones necesita consumir de los siguientes alimentos de cada rima alimenticio:  · Granos:      ¨ Panes, cereales y pastas de harina integral y Ismael Mason integral    ¨ Patatas fritas y galletas saladas bajas en grasa, bajas en sodio    · Verduras:      ¨ Brócoli, judías verdes, guisantes y espinacas    ¨ [place], col y habas    ¨ Zanahorias, patatas dulces, tomates y pimientos    ¨ Vegetales enlatados sin fahad añadida    · Frutas:      ¨ Plátanos, melocotones, peras y leal    ¨ Uvas, pasas y dátiles    ¨ [place], [place], [facility], [facility] y [facility]    ¨ [place], [place], melón y papaya    ¨ [place] y fresas    ¨ Conservas de frutas sin azúcares añadidos    · Productos lácteos bajos en grasa:      ¨ Leche sin grasa (descremada), leche 1%, leche baja en grasa de [place], anacardo o leche de soja fortificada con calcio    ¨ Queso cottage, queso bajo en grasa y yogur regular o congelado    · Emily y proteínas , haile de carne [place] de res o cerdo (chuletas, pierna, best), el german y el pavo sin piel, legumbres, productos de soya, las claras del huevo y nueces o mane secos    Alimentos y bebidas que debe limitar o evitar:  Pregúntele a mansfield médico o nutricionista sobre estos y otros alimentos que contienen altos niveles de mili [place] y [place] que no son saludables:  · [employer] , derik las comidas congeladas, [place], margaritaón con queso y cereales con más de 300 mg de sodio por porción    · Productos enlatados o mezclas secas  para pasteles, sopas o salsas    · Verduras con sodio agregada , [address] instantáneas, verduras con salsas agregadas o conservas regulares de verduras    · Otros alimentos altos en sodio , derik la salsa de [place], salsa de [facility] [place], aderezo para [place], encurtidos de [place], [place], salsa de soya y miso    · [employer] lácteos con alto contenido de grasa  derik leche entera o 2%, queso crema o crema agria y quesos     · Alimentos con proteínas altas en grasa  haile de carne ([address], las chuletas con hueso), el german o pavo sin piel y las vísceras de animal derik el hígado    · Emily curadas o [place] , derik las salchichas, el tocino y salchichones    · Aceites y grasas poco saludables , derik la [place], margarina en Arlyne Begin y aceites para cocinar derik el aceite de rakan o greco    · Alimentos y bebidas altas en azúcar , tales derik refrescos (gaseosas), bebidas deportivas, té azucarado, dulces, pasteles, galletas, tartas y donas  Otras pautas dietéticas que debe seguir:   · Consuma más alimentos que contengan grasas Omega-3  Consuma pescado con altas cantidades de Omega-3 por lo menos 2 veces a la semana  · Limite el consumo de alcohol  Demasiado alcohol puede dañar mansfield corazón y elevar mansfield presión arterial  Las mujeres deberían limitar el consumo de alcohol a 1 bebida por día  Los hombres deberían limitar el consumo de alcohol a 2 tragos al día  Un trago equivale a 12 onzas de cerveza, 5 onzas de vino o 1 onza y ½ de licor  · Escoja alimentos bajos en sodio  Alimentos altos de sodio pueden conducir a la hipertensión  Al preparar la comida añada muy poca sal o no use sal  Use hierbas y especias en vez de la sal     · Consuma más fibra  para ayudar a bajar los niveles de [place]  Consuma al menos 5 porciones de frutas y verduras todos los días  Consuma 3 onzas de alimentos integrales al día  Yaya Advent (fríjoles) son Daryle Chen buena irvin de [place]  Pautas de estilo de jerardo:   · No fume    La nicotina y otros químicos contenidos en los cigarrillos y cigarros pueden causar daño a thelma pulmones y el corazón  Pida información a winters médico si usted actualmente fuma y necesita ayuda para dejar de fumar  Los cigarrillos electrónicos o tabaco sin humo todavía contienen nicotina  Consulte con winters médico antes de [employer]  · Janna Gent regularmente  para que le ayude a mantener un peso saludable y mejorar winters presión arterial y niveles de colesterol  Pregunte a winters médico acerca del mejor plan de ejercicio para usted  No empiece un programa de ejercicios sin antes consultar con winters [employer] a thelma consultas de control con winters médico según le indicaron  Anote thelma preguntas para que se acuerde de hacerlas carina thelma visitas  © 2017 [address] Information is for End User's use only and may not be sold, redistributed or otherwise used for commercial purposes  All illustrations and images included in CareNotes® are the copyrighted property of A D A M , Inc  or Randy Fischer  Esta información es sólo para uso en educación  Winters intención no es darle un consejo médico sobre enfermedades o tratamientos  Colsulte con winters Alvin Lauth farmacéutico antes de seguir cualquier régimen médico para saber si es seguro y efectivo para usted

## 2020-07-22 NOTE — PROGRESS NOTES
Assessment/Plan:    Benign essential hypertension  162/100 during today's visit  Not controlled per JNC-8 guidelines  - Re-ordered Zestril 40 mg OD   - Continue to monitor  Schizophrenia [facility])  He sees a psychiatrist; most recent visit was yesterday  - Continue medications as prescribed by psychiatrist     Numbness and tingling in both hands  May be due to chemotherapy or a possible low vitamin B12     - Counseled to squeeze a soft ball a few times a day  - Will re-evaluate during next visit and order a repeat B12 if no improvement seen  Diagnoses and all orders for this visit:    Benign essential hypertension    Hypertension, unspecified type  -     lisinopril (ZESTRIL) 40 mg tablet; Take 1 tablet (40 mg total) by mouth daily          Subjective:      Patient ID: Rita Hightower is a 59 y o  male  A 59year old male came to the clinic for a follow-up for his hypertension  The patient says he has not taken his blood pressure medication in 3 months  He has no headaches, blurry vision, chest pain, shortness of breath, or leg swelling  He also reports some tingling in his hands that started about 1-2 years ago and occurs only at night, disturbing his sleep  The following portions of the patient's history were reviewed and updated as appropriate: He  has a past medical history of Colon cancer ([facility] [address] ), Depression, Hypertension, and Psychiatric disorder    He   Patient Active Problem List    Diagnosis Date Noted    Encounter for central line care 12/10/2019    Lung nodule 12/06/2019    Cataract 03/21/2019    Numbness and tingling in both hands 01/21/2019    Schizophrenia ([facility] [address] ) 08/06/2018    Dizziness 08/06/2018    Moderate persistent asthma without complication 24/45/4242    Hypercholesteremia 08/05/2018    GERD (gastroesophageal reflux disease) 08/05/2018    Other dietary vitamin B12 deficiency anemia 07/23/2018    Iron deficiency anemia 07/09/2018    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    Benign essential hypertension 05/12/2017    Depressed mood 05/12/2017     He  has a past surgical history that includes Portacath placement (Right) and Abdominal surgery  Current Outpatient Medications   Medication Sig Dispense Refill    albuterol (ProAir HFA) 90 mcg/act inhaler Inhale 2 puffs every 6 (six) hours as needed for wheezing 2 Inhaler 0    ARIPiprazole (ABILIFY) 20 MG tablet TK 1 T PO QD   2    ARTIFICIAL TEARS 1 4 % ophthalmic solution USE 1 DROP IN BOTH EYES 3-4 TIMES PER DAY AS NEEDED  0    aspirin (ECOTRIN LOW STRENGTH) 81 mg EC tablet Take 1 tablet (81 mg total) by mouth daily 30 tablet 2    atorvastatin (LIPITOR) 20 mg tablet Take 1 tablet (20 mg total) by mouth daily for 30 days 30 tablet 2    benztropine (COGENTIN) 0 5 mg tablet every 24 hours      clonazePAM (KlonoPIN) 1 mg tablet TK 1 T PO BID PRA  2    clonazePAM (KlonoPIN) 2 mg tablet TK 1 T PO HS PRN  2    cyanocobalamin (VITAMIN B-12) 1,000 mcg tablet Take 1 tablet (1,000 mcg total) by mouth daily 30 tablet 2    fluticasone (FLOVENT DISKUS) 50 MCG/BLIST diskus inhaler Inhale 1 puff 2 (two) times a day 1 Inhaler 3    lidocaine-prilocaine (EMLA) cream   3    lisinopril (ZESTRIL) 40 mg tablet Take 1 tablet (40 mg total) by mouth daily 30 tablet 2    loratadine (CLARITIN) 10 mg tablet Take 1 tablet (10 mg total) by mouth daily 30 tablet 2    mirtazapine (REMERON) 30 mg tablet TK 1 T PO HS  2    omeprazole (PriLOSEC) 20 mg delayed release capsule Take 1 capsule (20 mg total) by mouth daily 30 capsule 2    venlafaxine (EFFEXOR-XR) 150 mg 24 hr capsule TK ONE C PO D  2    venlafaxine (EFFEXOR-XR) 75 mg 24 hr capsule TK ONE C PO QD WF AND WITH VENLAFAXINE 150 MG  2    zolpidem (AMBIEN) 10 mg tablet TK 1 T PO HS PRF SLEEP  2     No current facility-administered medications for this visit  He has No Known Allergies       Review of Systems   Constitutional: Negative for activity change, appetite change and fever    Respiratory: Negative for cough and shortness of breath  Cardiovascular: Negative for chest pain, palpitations and leg swelling  Gastrointestinal: Negative for abdominal pain, constipation, diarrhea, nausea and vomiting  Musculoskeletal: Negative for back pain, gait problem and joint swelling  Objective:      /100 (BP Location: Right arm, Patient Position: Sitting, Cuff Size: Standard) Comment: pt has been out of med for 2 days  Pulse 86   Temp 97 5 °F (36 4 °C) (Temporal)   Resp 18   Ht 5' 7" (1 702 m)   Wt 81 6 kg (180 lb)   SpO2 97%   BMI 28 19 kg/m²          Physical Exam   Constitutional: He is oriented to person, place, and time  He appears well-developed and well-nourished  No distress  HENT:   Head: Normocephalic and atraumatic  Eyes: Conjunctivae and EOM are normal    Neck: Normal range of motion  Cardiovascular: Normal rate, regular rhythm, normal heart sounds and intact distal pulses  Exam reveals no gallop and no friction rub  No murmur heard  Pulmonary/Chest: Effort normal and breath sounds normal  No respiratory distress  He has no wheezes  He has no rales  Port for chemotherapy on right side of chest    Abdominal: Soft  Bowel sounds are normal  He exhibits no distension and no mass  There is no tenderness  There is no guarding  Musculoskeletal: Normal range of motion  He exhibits no edema, tenderness or deformity  Neurological: He is alert and oriented to person, place, and time  Skin: He is not diaphoretic  Psychiatric: He has a normal mood and affect  Vitals reviewed

## 2020-07-23 NOTE — ASSESSMENT & PLAN NOTE
He sees a psychiatrist; most recent visit was yesterday       - Continue medications as prescribed by psychiatrist

## 2020-07-23 NOTE — ASSESSMENT & PLAN NOTE
May be due to chemotherapy or a possible low vitamin B12     - Counseled to squeeze a soft ball a few times a day  - Will re-evaluate during next visit and order a repeat B12 if no improvement seen

## 2020-07-23 NOTE — ASSESSMENT & PLAN NOTE
162/100 during today's visit  Not controlled per JNC-8 guidelines  - Re-ordered Zestril 40 mg OD   - Continue to monitor

## 2020-08-12 ENCOUNTER — TELEPHONE (OUTPATIENT)
Dept: HEMATOLOGY ONCOLOGY | Facility: CLINIC | Age: 65
End: 2020-08-12

## 2020-08-12 ENCOUNTER — OFFICE VISIT (OUTPATIENT)
Dept: HEMATOLOGY ONCOLOGY | Facility: CLINIC | Age: 65
End: 2020-08-12
Payer: COMMERCIAL

## 2020-08-12 VITALS
BODY MASS INDEX: 27.84 KG/M2 | HEIGHT: 67 IN | HEART RATE: 78 BPM | SYSTOLIC BLOOD PRESSURE: 140 MMHG | DIASTOLIC BLOOD PRESSURE: 80 MMHG | TEMPERATURE: 98.3 F | WEIGHT: 177.4 LBS | RESPIRATION RATE: 18 BRPM | OXYGEN SATURATION: 98 %

## 2020-08-12 DIAGNOSIS — C19 RECTOSIGMOID JUNCTION CARCINOMA (HCC): Primary | Chronic | ICD-10-CM

## 2020-08-12 PROCEDURE — 3008F BODY MASS INDEX DOCD: CPT | Performed by: FAMILY MEDICINE

## 2020-08-12 PROCEDURE — 3008F BODY MASS INDEX DOCD: CPT | Performed by: INTERNAL MEDICINE

## 2020-08-12 PROCEDURE — 3077F SYST BP >= 140 MM HG: CPT | Performed by: INTERNAL MEDICINE

## 2020-08-12 PROCEDURE — 1036F TOBACCO NON-USER: CPT | Performed by: INTERNAL MEDICINE

## 2020-08-12 PROCEDURE — 99213 OFFICE O/P EST LOW 20 MIN: CPT | Performed by: INTERNAL MEDICINE

## 2020-08-12 PROCEDURE — 3079F DIAST BP 80-89 MM HG: CPT | Performed by: INTERNAL MEDICINE

## 2020-08-12 NOTE — PROGRESS NOTES
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  I did advise him to follow up with his colorectal surgeon for annual colonoscopy  We will see him again about 6 months from now  Prior to his next visit we will get a CT scan of the chest abdomen pelvis  If there is no hint of recurrence of his colon cancer as expected then we will get his Port-A-Cath removed  He agrees with the plan  - CBC and differential; Future  - Comprehensive metabolic panel; Future  - CEA; Future  - CT chest abdomen pelvis w wo contrast; Future        HPI:  The patient came today for a follow-up visit  He denies any symptoms whatsoever  He was supposed to get seen by Dr Javon Whalen for his annual colonoscopy which was not done for unknown reason  His blood work on 07/09/2020 showed normal CEA level with normal CMP  The CBC was not done for unknown reason  Oncology History   Mr Ana Laura Bourne was diagnosed with rectosigmoid adenocarcinoma in March of 2018  He was in his usual state of health until he started to notice blood per rectum with each bowel movement for approximately 2 months  He had a colonoscopy February 27th 2018 which revealed a rectosigmoid junction mass  Biopsy revealed moderately differentiated adenocarcinoma  Staging CT of the chest abdomen and pelvis on March 5, 2018 showed no significant hand of any metastatic process in the liver  He did have 3-5 mm round ground-glass opacities within the periphery periphery of the right upper lobe of the lung, 3-6 month follow-up was recommended  CT also revealed focal narrowing of the mid sigmoid colon with associated wall thickening and mild prostatic hypertrophy and posterior bladder diverticuli       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[facility] )   3/28/2018 Initial Diagnosis    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[place] [address] )  Stage IIIC     3/28/2018 Surgery     Low anterior resection of the rectosigmoid sigmoid junction mass by Dr Davidson Watts  Stage IIIc (T4, N2a, M0)     5/1/2018 - 10/1/2018 Chemotherapy    1  Adjuvant [id number]   Completed 12 cycles         Interval history:    ROS: Review of Systems   Constitutional: Negative for appetite change, diaphoresis, fatigue and fever  HENT: Negative for congestion, dental problem, facial swelling, hearing loss, tinnitus and trouble swallowing  Eyes: Negative for visual disturbance  Respiratory: Negative for cough, chest tightness, shortness of breath and wheezing  Cardiovascular: Negative for chest pain and leg swelling  Gastrointestinal: Negative for abdominal distention, abdominal pain, blood in stool, constipation, diarrhea, nausea and vomiting  Genitourinary: Negative for dysuria, hematuria and urgency  Musculoskeletal: Negative for arthralgias, myalgias and neck pain  Skin: Negative  Negative for color change, pallor, rash and wound  Neurological: Negative for dizziness, weakness, numbness and headaches  Hematological: Negative for adenopathy  Psychiatric/Behavioral: Negative for agitation, behavioral problems, confusion, hallucinations, self-injury and sleep disturbance  The patient is not nervous/anxious and is not hyperactive          Past Medical History:   Diagnosis Date    Colon cancer ([place] [address] )     Depression     Hypertension     Psychiatric disorder        Past Surgical History:   Procedure Laterality Date    ABDOMINAL SURGERY      PORTACATH PLACEMENT Right        Social History     Socioeconomic History    Marital status: Single     Spouse name: None    Number of children: None    Years of education: None    Highest education level: None   Occupational History    None   Social Needs    Financial resource strain: None    Food insecurity     Worry: None Inability: None    Transportation needs     Medical: None     Non-medical: None   Tobacco Use    Smoking status: Former Smoker    Smokeless tobacco: Never Used    Tobacco comment: quit 20 years ago    Substance and Sexual Activity    Alcohol use:  Yes     Alcohol/week: 2 0 - 3 0 standard drinks     Types: 2 - 3 Cans of beer per week     Comment: weekly    Drug use: No    Sexual activity: None   Lifestyle    Physical activity     Days per week: None     Minutes per session: None    Stress: None   Relationships    Social connections     Talks on phone: None     Gets together: None     Attends [employer] service: None     Active member of club or organization: None     Attends meetings of clubs or organizations: None     Relationship status: None    Intimate partner violence     Fear of current or ex partner: None     Emotionally abused: None     Physically abused: None     Forced sexual activity: None   Other Topics Concern    None   Social History Narrative    None       Family History   Problem Relation Age of Onset    No Known Problems Mother     No Known Problems Father        No Known Allergies      Current Outpatient Medications:     albuterol (ProAir HFA) 90 mcg/act inhaler, Inhale 2 puffs every 6 (six) hours as needed for wheezing, Disp: 2 Inhaler, Rfl: 0    ARIPiprazole (ABILIFY) 20 MG tablet, TK 1 T PO QD , Disp: , Rfl: 2    ARTIFICIAL TEARS 1 4 % ophthalmic solution, USE 1 DROP IN BOTH EYES 3-4 TIMES PER DAY AS NEEDED, Disp: , Rfl: 0    aspirin (ECOTRIN LOW STRENGTH) 81 mg EC tablet, Take 1 tablet (81 mg total) by mouth daily, Disp: 30 tablet, Rfl: 2    benztropine (COGENTIN) 0 5 mg tablet, every 24 hours, Disp: , Rfl:     clonazePAM (KlonoPIN) 1 mg tablet, TK 1 T PO BID PRA, Disp: , Rfl: 2    clonazePAM (KlonoPIN) 2 mg tablet, TK 1 T PO HS PRN , Disp: , Rfl: 2    cyanocobalamin (VITAMIN B-12) 1,000 mcg tablet, Take 1 tablet (1,000 mcg total) by mouth daily, Disp: 30 tablet, Rfl: 2    fluticasone (FLOVENT DISKUS) 50 MCG/BLIST diskus inhaler, Inhale 1 puff 2 (two) times a day, Disp: 1 Inhaler, Rfl: 3    lidocaine-prilocaine (EMLA) cream, , Disp: , Rfl: 3    lisinopril (ZESTRIL) 40 mg tablet, Take 1 tablet (40 mg total) by mouth daily, Disp: 30 tablet, Rfl: 2    loratadine (CLARITIN) 10 mg tablet, Take 1 tablet (10 mg total) by mouth daily, Disp: 30 tablet, Rfl: 2    mirtazapine (REMERON) 30 mg tablet, TK 1 T PO HS, Disp: , Rfl: 2    omeprazole (PriLOSEC) 20 mg delayed release capsule, Take 1 capsule (20 mg total) by mouth daily, Disp: 30 capsule, Rfl: 2    venlafaxine (EFFEXOR-XR) 150 mg 24 hr capsule, TK ONE C PO D, Disp: , Rfl: 2    venlafaxine (EFFEXOR-XR) 75 mg 24 hr capsule, TK ONE C PO QD WF AND WITH VENLAFAXINE 150 MG, Disp: , Rfl: 2    zolpidem (AMBIEN) 10 mg tablet, TK 1 T PO HS PRF SLEEP, Disp: , Rfl: 2    atorvastatin (LIPITOR) 20 mg tablet, Take 1 tablet (20 mg total) by mouth daily for 30 days, Disp: 30 tablet, Rfl: 2      Physical Exam:  /80 (BP Location: Left arm, Patient Position: Sitting, Cuff Size: Adult)   Pulse 78   Temp 98 3 °F (36 8 °C) (Tympanic)   Resp 18   Ht 5' 7" (1 702 m)   Wt 80 5 kg (177 lb 6 4 oz)   SpO2 98%   BMI 27 78 kg/m²     Physical Exam  Constitutional:       Appearance: Normal appearance  He is well-developed  HENT:      Head: Normocephalic and atraumatic  Eyes:      General: No scleral icterus  Right eye: No discharge  Left eye: No discharge  Conjunctiva/sclera: Conjunctivae normal       Pupils: Pupils are equal, round, and reactive to light  Neck:      Musculoskeletal: Normal range of motion and neck supple  Thyroid: No thyromegaly  Trachea: No tracheal deviation  Cardiovascular:      Rate and Rhythm: Normal rate and regular rhythm  Heart sounds: Normal heart sounds  No murmur  No friction rub  Pulmonary:      Effort: Pulmonary effort is normal  No respiratory distress        Breath sounds: Normal breath sounds  No wheezing or rales  Chest:      Chest wall: No tenderness  Abdominal:      General: Bowel sounds are normal  There is no distension  Palpations: Abdomen is soft  There is no hepatomegaly, splenomegaly or mass  Tenderness: There is no abdominal tenderness  There is no guarding or rebound  Musculoskeletal: Normal range of motion  General: No tenderness or deformity  Lymphadenopathy:      Cervical: No cervical adenopathy  Skin:     General: Skin is warm and dry  Coloration: Skin is not pale  Findings: No erythema or rash  Neurological:      Mental Status: He is alert and oriented to person, place, and time  Cranial Nerves: No cranial nerve deficit  Coordination: Coordination normal       Deep Tendon Reflexes: Reflexes are normal and symmetric  Reflexes normal    Psychiatric:         Behavior: Behavior normal          Thought Content: Thought content normal          Judgment: Judgment normal            Labs:  Lab Results   Component Value Date    WBC 6 80 02/25/2020    HGB 13 9 02/25/2020    HCT 42 3 02/25/2020    MCV 81 02/25/2020     02/25/2020     Lab Results   Component Value Date     06/11/2018    K 3 9 07/09/2020     07/09/2020    CO2 28 07/09/2020    ANIONGAP 12 06/11/2018    BUN 16 07/09/2020    CREATININE 0 85 07/09/2020    GLUCOSE 101 (H) 06/11/2018    GLUF 93 07/09/2020    CALCIUM 9 0 07/09/2020    AST 38 07/09/2020    ALT 44 07/09/2020    ALKPHOS 61 07/09/2020    PROT 7 1 06/11/2018    BILITOT 0 3 06/11/2018    EGFR 92 07/09/2020     No results found for: TSH    Patient voiced understanding and agreement in the above discussion  Aware to contact our office with questions/symptoms in the interim

## 2020-08-12 NOTE — TELEPHONE ENCOUNTER
I called Dr Vladimir Delaney office and spoke with Will Angel in regards to scheduling a colonoscopy for the patient  I was told since the patient has not been seen since 2018, he would have to be seen first before the colonoscopy  Will Angel also stated they would reach out to the patient to schedule the appointment

## 2020-08-24 ENCOUNTER — HOSPITAL ENCOUNTER (OUTPATIENT)
Dept: INFUSION CENTER | Facility: HOSPITAL | Age: 65
Discharge: HOME | End: 2020-08-24
Payer: COMMERCIAL

## 2020-08-24 DIAGNOSIS — C19 RECTOSIGMOID JUNCTION CARCINOMA (HCC): ICD-10-CM

## 2020-08-24 DIAGNOSIS — Z45.2 ENCOUNTER FOR CENTRAL LINE CARE: Primary | ICD-10-CM

## 2020-08-24 PROCEDURE — 96523 IRRIG DRUG DELIVERY DEVICE: CPT

## 2020-08-24 NOTE — PROGRESS NOTES
Pt here for port flush, denies any needs for labs today, port flushed per protocol, next appts scheduled and AVS provided

## 2020-10-29 DIAGNOSIS — I10 HYPERTENSION, UNSPECIFIED TYPE: ICD-10-CM

## 2020-10-29 RX ORDER — LISINOPRIL 40 MG/1
TABLET ORAL
Qty: 30 TABLET | Refills: 2 | Status: SHIPPED | OUTPATIENT
Start: 2020-10-29 | End: 2021-02-15 | Stop reason: SDUPTHER

## 2020-12-07 PROBLEM — E66.3 OVERWEIGHT (BMI 25.0-29.9): Status: ACTIVE | Noted: 2020-12-07

## 2020-12-08 ENCOUNTER — OFFICE VISIT (OUTPATIENT)
Dept: FAMILY MEDICINE CLINIC | Facility: CLINIC | Age: 65
End: 2020-12-08

## 2020-12-08 VITALS
WEIGHT: 180 LBS | BODY MASS INDEX: 28.19 KG/M2 | RESPIRATION RATE: 18 BRPM | SYSTOLIC BLOOD PRESSURE: 138 MMHG | OXYGEN SATURATION: 98 % | HEART RATE: 91 BPM | TEMPERATURE: 97.5 F | DIASTOLIC BLOOD PRESSURE: 82 MMHG

## 2020-12-08 DIAGNOSIS — Z11.4 SCREENING FOR HIV (HUMAN IMMUNODEFICIENCY VIRUS): ICD-10-CM

## 2020-12-08 DIAGNOSIS — Z23 NEED FOR VACCINATION: ICD-10-CM

## 2020-12-08 DIAGNOSIS — E66.3 OVERWEIGHT (BMI 25.0-29.9): ICD-10-CM

## 2020-12-08 DIAGNOSIS — I10 BENIGN ESSENTIAL HYPERTENSION: Primary | ICD-10-CM

## 2020-12-08 DIAGNOSIS — Z11.59 ENCOUNTER FOR HEPATITIS C SCREENING TEST FOR LOW RISK PATIENT: ICD-10-CM

## 2020-12-08 DIAGNOSIS — H04.123 DRY EYES, BILATERAL: ICD-10-CM

## 2020-12-08 PROCEDURE — 90662 IIV NO PRSV INCREASED AG IM: CPT | Performed by: FAMILY MEDICINE

## 2020-12-08 PROCEDURE — 99213 OFFICE O/P EST LOW 20 MIN: CPT | Performed by: FAMILY MEDICINE

## 2020-12-08 PROCEDURE — 3079F DIAST BP 80-89 MM HG: CPT | Performed by: FAMILY MEDICINE

## 2020-12-08 PROCEDURE — 3075F SYST BP GE 130 - 139MM HG: CPT | Performed by: FAMILY MEDICINE

## 2020-12-08 PROCEDURE — 1036F TOBACCO NON-USER: CPT | Performed by: FAMILY MEDICINE

## 2020-12-08 PROCEDURE — G0008 ADMIN INFLUENZA VIRUS VAC: HCPCS | Performed by: FAMILY MEDICINE

## 2021-02-11 ENCOUNTER — TELEPHONE (OUTPATIENT)
Dept: HEMATOLOGY ONCOLOGY | Facility: CLINIC | Age: 66
End: 2021-02-11

## 2021-02-11 NOTE — TELEPHONE ENCOUNTER
Spoke to dad she said no   To all the prescreening questons  And will have dad go for labs or  Call back to rs

## 2021-02-12 ENCOUNTER — TELEPHONE (OUTPATIENT)
Dept: HEMATOLOGY ONCOLOGY | Facility: CLINIC | Age: 66
End: 2021-02-12

## 2021-02-15 ENCOUNTER — NURSE TRIAGE (OUTPATIENT)
Dept: OTHER | Facility: OTHER | Age: 66
End: 2021-02-15

## 2021-02-15 DIAGNOSIS — I10 HYPERTENSION, UNSPECIFIED TYPE: ICD-10-CM

## 2021-02-15 DIAGNOSIS — I10 HYPERTENSION, UNSPECIFIED TYPE: Primary | ICD-10-CM

## 2021-02-15 RX ORDER — LISINOPRIL 40 MG/1
40 TABLET ORAL DAILY
Qty: 7 TABLET | Refills: 0 | Status: SHIPPED | OUTPATIENT
Start: 2021-02-15 | End: 2021-02-16 | Stop reason: SDUPTHER

## 2021-02-15 NOTE — TELEPHONE ENCOUNTER
Pt  Is calling in a prescription refill for lisinopril (ZESTRIL) 40 mg tablet  Pt  Is in need of medication  Please call pt

## 2021-02-15 NOTE — TELEPHONE ENCOUNTER
Regarding: English speaking - needs medciation urgently  ----- Message from Linda Zavala sent at 2/15/2021  4:08 PM EST -----  " I am currently out of my lisinopril (ZESTRIL) 40 mg tablet   I cannot sleep, I have dizziness, and headaches "

## 2021-02-15 NOTE — TELEPHONE ENCOUNTER
Reason for Disposition   Caller requesting a refill, no triage required, and triager able to refill per department policy    Answer Assessment - Initial Assessment Questions  1  NAME of MEDICATION: "What medicine are you calling about?"      Lisinopril 40 mg  2  QUESTION: "What is your question?"      "I need a refill"  3  PRESCRIBING HCP: "Who prescribed it?" Reason: if prescribed by specialist, call should be referred to that group    [facility] Physician    Protocols used: MEDICATION QUESTION CALL-ADULT-[place]

## 2021-02-16 RX ORDER — LISINOPRIL 40 MG/1
40 TABLET ORAL DAILY
Qty: 7 TABLET | Refills: 0 | Status: SHIPPED | OUTPATIENT
Start: 2021-02-16 | End: 2021-06-29

## 2021-02-16 RX ORDER — LISINOPRIL 40 MG/1
40 TABLET ORAL DAILY
Qty: 30 TABLET | Refills: 2 | Status: SHIPPED | OUTPATIENT
Start: 2021-02-16 | End: 2021-06-20

## 2021-02-17 ENCOUNTER — TELEPHONE (OUTPATIENT)
Dept: FAMILY MEDICINE CLINIC | Facility: CLINIC | Age: 66
End: 2021-02-17

## 2021-03-18 ENCOUNTER — APPOINTMENT (OUTPATIENT)
Dept: LAB | Facility: HOSPITAL | Age: 66
End: 2021-03-18
Attending: INTERNAL MEDICINE
Payer: COMMERCIAL

## 2021-03-18 DIAGNOSIS — C19 RECTOSIGMOID JUNCTION CARCINOMA (HCC): Chronic | ICD-10-CM

## 2021-03-18 DIAGNOSIS — Z79.899 ENCOUNTER FOR LONG-TERM (CURRENT) USE OF OTHER MEDICATIONS: Primary | ICD-10-CM

## 2021-03-18 DIAGNOSIS — Z11.59 ENCOUNTER FOR HEPATITIS C SCREENING TEST FOR LOW RISK PATIENT: ICD-10-CM

## 2021-03-18 DIAGNOSIS — Z11.4 SCREENING FOR HIV (HUMAN IMMUNODEFICIENCY VIRUS): ICD-10-CM

## 2021-03-18 LAB
ALBUMIN SERPL BCP-MCNC: 4.4 G/DL (ref 3–5.2)
ALP SERPL-CCNC: 58 U/L (ref 43–122)
ALT SERPL W P-5'-P-CCNC: 16 U/L (ref 9–52)
ANION GAP SERPL CALCULATED.3IONS-SCNC: 7 MMOL/L (ref 5–14)
AST SERPL W P-5'-P-CCNC: 23 U/L (ref 17–59)
BASOPHILS # BLD AUTO: 0 THOUSANDS/ΜL (ref 0–0.1)
BASOPHILS NFR BLD AUTO: 0 % (ref 0–1)
BILIRUB SERPL-MCNC: 0.6 MG/DL
BUN SERPL-MCNC: 15 MG/DL (ref 5–25)
CALCIUM SERPL-MCNC: 9.1 MG/DL (ref 8.4–10.2)
CEA SERPL-MCNC: <0.5 NG/ML (ref 0–3)
CHLORIDE SERPL-SCNC: 99 MMOL/L (ref 97–108)
CHOLEST SERPL-MCNC: 208 MG/DL
CO2 SERPL-SCNC: 32 MMOL/L (ref 22–30)
CREAT SERPL-MCNC: 1.11 MG/DL (ref 0.7–1.5)
EOSINOPHIL # BLD AUTO: 0.1 THOUSAND/ΜL (ref 0–0.4)
EOSINOPHIL NFR BLD AUTO: 1 % (ref 0–6)
ERYTHROCYTE [DISTWIDTH] IN BLOOD BY AUTOMATED COUNT: 14.9 %
GFR SERPL CREATININE-BSD FRML MDRD: 69 ML/MIN/1.73SQ M
GLUCOSE P FAST SERPL-MCNC: 108 MG/DL (ref 70–99)
HCT VFR BLD AUTO: 45.6 % (ref 41–53)
HCV AB SER QL: NORMAL
HDLC SERPL-MCNC: 50 MG/DL
HGB BLD-MCNC: 14.6 G/DL (ref 13.5–17.5)
LDLC SERPL CALC-MCNC: 135 MG/DL
LYMPHOCYTES # BLD AUTO: 2.7 THOUSANDS/ΜL (ref 0.5–4)
LYMPHOCYTES NFR BLD AUTO: 33 % (ref 25–45)
MCH RBC QN AUTO: 26.3 PG (ref 26–34)
MCHC RBC AUTO-ENTMCNC: 32 G/DL (ref 31–36)
MCV RBC AUTO: 82 FL (ref 80–100)
MONOCYTES # BLD AUTO: 0.6 THOUSAND/ΜL (ref 0.2–0.9)
MONOCYTES NFR BLD AUTO: 7 % (ref 1–10)
NEUTROPHILS # BLD AUTO: 4.7 THOUSANDS/ΜL (ref 1.8–7.8)
NEUTS SEG NFR BLD AUTO: 58 % (ref 45–65)
NONHDLC SERPL-MCNC: 158 MG/DL
PLATELET # BLD AUTO: 305 THOUSANDS/UL (ref 150–450)
PMV BLD AUTO: 9.3 FL (ref 8.9–12.7)
POTASSIUM SERPL-SCNC: 4.2 MMOL/L (ref 3.6–5)
PROT SERPL-MCNC: 7.7 G/DL (ref 5.9–8.4)
RBC # BLD AUTO: 5.54 MILLION/UL (ref 4.5–5.9)
SODIUM SERPL-SCNC: 138 MMOL/L (ref 137–147)
TRIGL SERPL-MCNC: 116 MG/DL
TSH SERPL DL<=0.05 MIU/L-ACNC: 1.29 UIU/ML (ref 0.47–4.68)
WBC # BLD AUTO: 8 THOUSAND/UL (ref 4.5–11)

## 2021-03-18 PROCEDURE — 87389 HIV-1 AG W/HIV-1&-2 AB AG IA: CPT

## 2021-03-18 PROCEDURE — 80053 COMPREHEN METABOLIC PANEL: CPT

## 2021-03-18 PROCEDURE — 82378 CARCINOEMBRYONIC ANTIGEN: CPT

## 2021-03-18 PROCEDURE — 85025 COMPLETE CBC W/AUTO DIFF WBC: CPT

## 2021-03-18 PROCEDURE — 84443 ASSAY THYROID STIM HORMONE: CPT

## 2021-03-18 PROCEDURE — 80061 LIPID PANEL: CPT

## 2021-03-18 PROCEDURE — 86803 HEPATITIS C AB TEST: CPT

## 2021-03-18 PROCEDURE — 36415 COLL VENOUS BLD VENIPUNCTURE: CPT

## 2021-03-19 LAB — HIV 1+2 AB+HIV1 P24 AG SERPL QL IA: NORMAL

## 2021-03-23 ENCOUNTER — OFFICE VISIT (OUTPATIENT)
Dept: FAMILY MEDICINE CLINIC | Facility: CLINIC | Age: 66
End: 2021-03-23

## 2021-03-23 VITALS
BODY MASS INDEX: 27.37 KG/M2 | WEIGHT: 174.4 LBS | SYSTOLIC BLOOD PRESSURE: 138 MMHG | OXYGEN SATURATION: 97 % | DIASTOLIC BLOOD PRESSURE: 76 MMHG | HEART RATE: 104 BPM | TEMPERATURE: 97.1 F | RESPIRATION RATE: 20 BRPM | HEIGHT: 67 IN

## 2021-03-23 DIAGNOSIS — M25.561 RIGHT KNEE PAIN, UNSPECIFIED CHRONICITY: ICD-10-CM

## 2021-03-23 DIAGNOSIS — I10 BENIGN ESSENTIAL HYPERTENSION: Primary | ICD-10-CM

## 2021-03-23 DIAGNOSIS — H25.9 AGE-RELATED CATARACT OF BOTH EYES, UNSPECIFIED AGE-RELATED CATARACT TYPE: ICD-10-CM

## 2021-03-23 DIAGNOSIS — E78.5 HYPERLIPIDEMIA, UNSPECIFIED HYPERLIPIDEMIA TYPE: ICD-10-CM

## 2021-03-23 DIAGNOSIS — E78.00 HYPERCHOLESTEREMIA: ICD-10-CM

## 2021-03-23 PROBLEM — E78.2 MODERATE MIXED HYPERLIPIDEMIA NOT REQUIRING STATIN THERAPY: Status: ACTIVE | Noted: 2021-03-23

## 2021-03-23 PROCEDURE — 3008F BODY MASS INDEX DOCD: CPT | Performed by: FAMILY MEDICINE

## 2021-03-23 PROCEDURE — 3075F SYST BP GE 130 - 139MM HG: CPT | Performed by: FAMILY MEDICINE

## 2021-03-23 PROCEDURE — 1101F PT FALLS ASSESS-DOCD LE1/YR: CPT | Performed by: FAMILY MEDICINE

## 2021-03-23 PROCEDURE — 3078F DIAST BP <80 MM HG: CPT | Performed by: FAMILY MEDICINE

## 2021-03-23 PROCEDURE — 1036F TOBACCO NON-USER: CPT | Performed by: FAMILY MEDICINE

## 2021-03-23 PROCEDURE — 99213 OFFICE O/P EST LOW 20 MIN: CPT | Performed by: FAMILY MEDICINE

## 2021-03-23 PROCEDURE — 3288F FALL RISK ASSESSMENT DOCD: CPT | Performed by: FAMILY MEDICINE

## 2021-03-23 PROCEDURE — 1160F RVW MEDS BY RX/DR IN RCRD: CPT | Performed by: FAMILY MEDICINE

## 2021-03-23 RX ORDER — POLYVINYL ALCOHOL 14 MG/ML
1 SOLUTION/ DROPS OPHTHALMIC AS NEEDED
Qty: 15 ML | Refills: 0 | Status: SHIPPED | OUTPATIENT
Start: 2021-03-23

## 2021-03-23 RX ORDER — ATORVASTATIN CALCIUM 20 MG/1
20 TABLET, FILM COATED ORAL DAILY
Qty: 30 TABLET | Refills: 2 | Status: SHIPPED | OUTPATIENT
Start: 2021-03-23 | End: 2021-07-19

## 2021-03-23 RX ORDER — ACETAMINOPHEN 500 MG
500 TABLET ORAL EVERY 6 HOURS PRN
Qty: 30 TABLET | Refills: 1 | Status: SHIPPED | OUTPATIENT
Start: 2021-03-23

## 2021-03-23 NOTE — ASSESSMENT & PLAN NOTE
Lipid Panel from 3/2021: total cholesterol 208, triglycerides 116, HDL 50,   Well controlled at this time  - Continue Lipitor 20 mg daily

## 2021-03-23 NOTE — PROGRESS NOTES
Assessment/Plan:    Benign essential hypertension  BP Today: 138/76  At goal per JNC-8 guidelines  - Continue Zestril 40 mg daily   - Adhere to low salt diet and exercise  - Weight loss counseling to prevent high BP readings  Mixed hyperlipidemia  Lipid Panel from 3/2021: total cholesterol 208, triglycerides 116, HDL 50,   Well controlled at this time  - Continue Lipitor 20 mg daily  Diagnoses and all orders for this visit:    Benign essential hypertension    Age-related cataract of both eyes, unspecified age-related cataract type  -     Artificial Tears 1 4 % ophthalmic solution; Administer 1 drop to both eyes as needed for dry eyes  -     Ambulatory referral to Ophthalmology; Future    Right knee pain, unspecified chronicity  -     acetaminophen (TYLENOL) 500 mg tablet; Take 1 tablet (500 mg total) by mouth every 6 (six) hours as needed for mild pain or moderate pain    Hypercholesteremia    Hyperlipidemia, unspecified hyperlipidemia type  -     atorvastatin (LIPITOR) 20 mg tablet; Take 1 tablet (20 mg total) by mouth daily          Subjective:      Patient ID: Basilio Lopez is a 72 y o  male  A pleasant 72year old male with a PMH of hypertension, hyperlipidemia, colorectal carcinoma (s/p chemotherapy, now in remission) presents to the clinic for a follow-up on his chronic conditions  He reports having dry eyes and requested a referral to see an ophthalmologist, which was placed at this visit  He denies chest pain, shortness of breath, abdominal pain, N/V/D, urinary symptoms, weakness, dizziness, or leg swelling  HIV and Hepatitis C screenings were done and are both negative        The following portions of the patient's history were reviewed and updated as appropriate: allergies, current medications, past family history, past medical history, past social history, past surgical history and problem list     Review of Systems   Constitutional: Negative for activity change, appetite change, chills, fatigue and fever  Respiratory: Negative for cough and shortness of breath  Cardiovascular: Negative for chest pain, palpitations and leg swelling  Gastrointestinal: Negative for abdominal pain, constipation, diarrhea, nausea and vomiting  Musculoskeletal: Negative for back pain and gait problem  Mild right knee pain  Neurological: Negative for dizziness, seizures, weakness, light-headedness and headaches  Objective:      /76 (BP Location: Left arm, Patient Position: Sitting, Cuff Size: Standard)   Pulse 104   Temp (!) 97 1 °F (36 2 °C) (Temporal)   Resp 20   Ht 5' 7" (1 702 m)   Wt 79 1 kg (174 lb 6 4 oz)   SpO2 97%   BMI 27 31 kg/m²          Physical Exam  Vitals signs reviewed  Constitutional:       General: He is not in acute distress  Appearance: He is well-developed  He is not diaphoretic  HENT:      Head: Normocephalic and atraumatic  Eyes:      Conjunctiva/sclera: Conjunctivae normal    Neck:      Musculoskeletal: Normal range of motion  Cardiovascular:      Rate and Rhythm: Normal rate and regular rhythm  Heart sounds: Normal heart sounds  No murmur  No friction rub  No gallop  Pulmonary:      Effort: Pulmonary effort is normal  No respiratory distress  Breath sounds: Normal breath sounds  No wheezing or rales  Abdominal:      General: Bowel sounds are normal  There is no distension  Palpations: Abdomen is soft  There is no mass  Tenderness: There is no abdominal tenderness  There is no guarding  Musculoskeletal: Normal range of motion  General: No tenderness or deformity  Right lower leg: No edema  Left lower leg: No edema  Skin:     General: Skin is warm and dry  Neurological:      Mental Status: He is alert and oriented to person, place, and time           Chani Taylor MD  3/23/2021

## 2021-03-23 NOTE — ASSESSMENT & PLAN NOTE
BP Today: 138/76  At goal per JNC-8 guidelines  - Continue Zestril 40 mg daily   - Adhere to low salt diet and exercise  - Weight loss counseling to prevent high BP readings

## 2021-03-29 ENCOUNTER — IMMUNIZATIONS (OUTPATIENT)
Dept: FAMILY MEDICINE CLINIC | Facility: HOSPITAL | Age: 66
End: 2021-03-29

## 2021-03-29 DIAGNOSIS — Z23 ENCOUNTER FOR IMMUNIZATION: Primary | ICD-10-CM

## 2021-03-29 PROCEDURE — 0011A SARS-COV-2 / COVID-19 MRNA VACCINE (MODERNA) 100 MCG: CPT

## 2021-03-29 PROCEDURE — 91301 SARS-COV-2 / COVID-19 MRNA VACCINE (MODERNA) 100 MCG: CPT

## 2021-04-30 ENCOUNTER — IMMUNIZATIONS (OUTPATIENT)
Dept: FAMILY MEDICINE CLINIC | Facility: HOSPITAL | Age: 66
End: 2021-04-30

## 2021-04-30 DIAGNOSIS — Z23 ENCOUNTER FOR IMMUNIZATION: Primary | ICD-10-CM

## 2021-04-30 PROCEDURE — 91301 SARS-COV-2 / COVID-19 MRNA VACCINE (MODERNA) 100 MCG: CPT

## 2021-04-30 PROCEDURE — 0012A SARS-COV-2 / COVID-19 MRNA VACCINE (MODERNA) 100 MCG: CPT

## 2021-06-19 DIAGNOSIS — I10 HYPERTENSION, UNSPECIFIED TYPE: ICD-10-CM

## 2021-06-20 RX ORDER — LISINOPRIL 40 MG/1
TABLET ORAL
Qty: 30 TABLET | Refills: 2 | Status: SHIPPED | OUTPATIENT
Start: 2021-06-20 | End: 2021-07-01

## 2021-06-29 ENCOUNTER — OFFICE VISIT (OUTPATIENT)
Dept: FAMILY MEDICINE CLINIC | Facility: CLINIC | Age: 66
End: 2021-06-29

## 2021-06-29 VITALS
DIASTOLIC BLOOD PRESSURE: 78 MMHG | TEMPERATURE: 98.3 F | BODY MASS INDEX: 27.57 KG/M2 | SYSTOLIC BLOOD PRESSURE: 112 MMHG | HEART RATE: 89 BPM | OXYGEN SATURATION: 98 % | RESPIRATION RATE: 18 BRPM | WEIGHT: 176 LBS

## 2021-06-29 DIAGNOSIS — Z12.5 PROSTATE CANCER SCREENING: ICD-10-CM

## 2021-06-29 DIAGNOSIS — I10 BENIGN ESSENTIAL HYPERTENSION: Primary | ICD-10-CM

## 2021-06-29 DIAGNOSIS — C19 RECTOSIGMOID JUNCTION CARCINOMA (HCC): ICD-10-CM

## 2021-06-29 DIAGNOSIS — E78.2 MIXED HYPERLIPIDEMIA: ICD-10-CM

## 2021-06-29 PROCEDURE — 1036F TOBACCO NON-USER: CPT | Performed by: FAMILY MEDICINE

## 2021-06-29 PROCEDURE — 3078F DIAST BP <80 MM HG: CPT | Performed by: FAMILY MEDICINE

## 2021-06-29 PROCEDURE — 1160F RVW MEDS BY RX/DR IN RCRD: CPT | Performed by: FAMILY MEDICINE

## 2021-06-29 PROCEDURE — 99213 OFFICE O/P EST LOW 20 MIN: CPT | Performed by: FAMILY MEDICINE

## 2021-06-29 PROCEDURE — 3074F SYST BP LT 130 MM HG: CPT | Performed by: FAMILY MEDICINE

## 2021-06-29 RX ORDER — MULTIVITAMIN
1 TABLET ORAL DAILY
Qty: 90 TABLET | Refills: 1 | Status: SHIPPED | OUTPATIENT
Start: 2021-06-29

## 2021-06-29 NOTE — ASSESSMENT & PLAN NOTE
BP Today: 112/78  At goal per JNC-8 guidelines  - Continue Zestril 40 mg daily   - Adhere to low salt diet and exercise  - Weight loss counseling to prevent high BP readings

## 2021-06-29 NOTE — PROGRESS NOTES
Assessment/Plan:    Benign essential hypertension  BP Today: 112/78  At goal per JNC-8 guidelines  - Continue Zestril 40 mg daily   - Adhere to low salt diet and exercise  - Weight loss counseling to prevent high BP readings  Mixed hyperlipidemia  Lipid Panel from 3/2021: total cholesterol 208, triglycerides 116, HDL 50,   Well controlled at this time  - Continue Lipitor 20 mg daily  Diagnoses and all orders for this visit:    Benign essential hypertension  -     Multiple Vitamin (multivitamin) tablet; Take 1 tablet by mouth daily    Mixed hyperlipidemia    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[address] )  -     Ambulatory referral to General Surgery; Future    Prostate cancer screening  -     PSA, Total Screen; Future          Subjective:      Patient ID: Scott Mir is a 72 y o  male  A pleasant 72year old male with a PMH of hypertension, hyperlipidemia, colorectal carcinoma (s/p chemotherapy, now in remission) presents to the clinic for a follow-up on his chronic conditions  He denies chest pain, shortness of breath, abdominal pain, N/V/D, urinary symptoms, weakness, dizziness, or leg swelling  HIV and Hepatitis C screenings were done and are both negative  PSA screening was discussed today, and patient would like to get this done so order was placed  The following portions of the patient's history were reviewed and updated as appropriate: allergies, current medications, past family history, past medical history, past social history, past surgical history and problem list     Review of Systems   Constitutional: Negative for activity change, appetite change, chills, fatigue and fever  Respiratory: Negative for cough and shortness of breath  Cardiovascular: Negative for chest pain, palpitations and leg swelling  Gastrointestinal: Negative for abdominal pain, constipation, diarrhea, nausea and vomiting  Musculoskeletal: Negative for back pain and gait problem     Neurological: Negative for dizziness, seizures, weakness, light-headedness and headaches  Objective:      /78 (BP Location: Left arm, Patient Position: Sitting, Cuff Size: Standard)   Pulse 89   Temp 98 3 °F (36 8 °C)   Resp 18   Wt 79 8 kg (176 lb)   SpO2 98%   BMI 27 57 kg/m²          Physical Exam  Vitals reviewed  Constitutional:       General: He is not in acute distress  Appearance: He is well-developed  He is not diaphoretic  HENT:      Head: Normocephalic and atraumatic  Eyes:      Conjunctiva/sclera: Conjunctivae normal    Cardiovascular:      Rate and Rhythm: Normal rate and regular rhythm  Heart sounds: Normal heart sounds  No murmur heard  No friction rub  No gallop  Pulmonary:      Effort: Pulmonary effort is normal  No respiratory distress  Breath sounds: Normal breath sounds  No wheezing or rales  Abdominal:      General: Bowel sounds are normal  There is no distension  Palpations: Abdomen is soft  There is no mass  Tenderness: There is no abdominal tenderness  There is no guarding  Musculoskeletal:         General: No tenderness or deformity  Normal range of motion  Cervical back: Normal range of motion  Skin:     General: Skin is warm and dry  Neurological:      Mental Status: He is alert and oriented to person, place, and time             Antelmo Shaikh MD  6/29/2021

## 2021-07-01 DIAGNOSIS — I10 HYPERTENSION, UNSPECIFIED TYPE: ICD-10-CM

## 2021-07-01 RX ORDER — LISINOPRIL 40 MG/1
TABLET ORAL
Qty: 30 TABLET | Refills: 2 | Status: SHIPPED | OUTPATIENT
Start: 2021-07-01 | End: 2021-10-16 | Stop reason: SDUPTHER

## 2021-07-19 DIAGNOSIS — E78.5 HYPERLIPIDEMIA, UNSPECIFIED HYPERLIPIDEMIA TYPE: ICD-10-CM

## 2021-07-19 RX ORDER — ATORVASTATIN CALCIUM 20 MG/1
TABLET, FILM COATED ORAL
Qty: 30 TABLET | Refills: 2 | Status: SHIPPED | OUTPATIENT
Start: 2021-07-19 | End: 2021-11-30

## 2021-09-29 ENCOUNTER — OFFICE VISIT (OUTPATIENT)
Dept: FAMILY MEDICINE CLINIC | Facility: CLINIC | Age: 66
End: 2021-09-29

## 2021-09-29 VITALS
SYSTOLIC BLOOD PRESSURE: 102 MMHG | WEIGHT: 174 LBS | OXYGEN SATURATION: 97 % | HEART RATE: 105 BPM | BODY MASS INDEX: 27.31 KG/M2 | RESPIRATION RATE: 16 BRPM | TEMPERATURE: 97.4 F | HEIGHT: 67 IN | DIASTOLIC BLOOD PRESSURE: 60 MMHG

## 2021-09-29 DIAGNOSIS — R35.89 POLYURIA: ICD-10-CM

## 2021-09-29 DIAGNOSIS — Z23 FLU VACCINE NEED: Primary | ICD-10-CM

## 2021-09-29 DIAGNOSIS — R35.0 BENIGN PROSTATIC HYPERPLASIA WITH URINARY FREQUENCY: ICD-10-CM

## 2021-09-29 DIAGNOSIS — N40.1 BENIGN PROSTATIC HYPERPLASIA WITH URINARY FREQUENCY: ICD-10-CM

## 2021-09-29 DIAGNOSIS — I10 BENIGN ESSENTIAL HYPERTENSION: ICD-10-CM

## 2021-09-29 DIAGNOSIS — J45.40 MODERATE PERSISTENT ASTHMA WITHOUT COMPLICATION: ICD-10-CM

## 2021-09-29 DIAGNOSIS — F20.9 SCHIZOPHRENIA, UNSPECIFIED TYPE (HCC): ICD-10-CM

## 2021-09-29 DIAGNOSIS — J45.909 UNCOMPLICATED ASTHMA, UNSPECIFIED ASTHMA SEVERITY, UNSPECIFIED WHETHER PERSISTENT: ICD-10-CM

## 2021-09-29 PROCEDURE — 90662 IIV NO PRSV INCREASED AG IM: CPT | Performed by: FAMILY MEDICINE

## 2021-09-29 PROCEDURE — 99213 OFFICE O/P EST LOW 20 MIN: CPT | Performed by: FAMILY MEDICINE

## 2021-09-29 PROCEDURE — G0008 ADMIN INFLUENZA VIRUS VAC: HCPCS | Performed by: FAMILY MEDICINE

## 2021-09-29 RX ORDER — ALBUTEROL SULFATE 90 UG/1
2 AEROSOL, METERED RESPIRATORY (INHALATION) EVERY 6 HOURS PRN
Qty: 18 G | Refills: 2 | Status: SHIPPED | OUTPATIENT
Start: 2021-09-29 | End: 2021-12-28

## 2021-09-29 RX ORDER — BUDESONIDE AND FORMOTEROL FUMARATE DIHYDRATE 80; 4.5 UG/1; UG/1
2 AEROSOL RESPIRATORY (INHALATION) 2 TIMES DAILY
Qty: 10.2 G | Refills: 2 | Status: SHIPPED | OUTPATIENT
Start: 2021-09-29 | End: 2021-12-30

## 2021-09-29 RX ORDER — TAMSULOSIN HYDROCHLORIDE 0.4 MG/1
0.4 CAPSULE ORAL
Qty: 90 CAPSULE | Refills: 0 | Status: SHIPPED | OUTPATIENT
Start: 2021-09-29 | End: 2021-12-28

## 2021-09-29 NOTE — ASSESSMENT & PLAN NOTE
Patient has a history of polyuria, as well as nocturia, which has been present for the past 6 months  He denies increased urgency, dribbling of urine, slow urinary stream, or straining  With patient's age and symptomatology, most likely secondary to BPH      - Urinalysis ordered  - PSA ordered  - KUB with PVR ordered  - Trial of Flomax ordered

## 2021-09-29 NOTE — ASSESSMENT & PLAN NOTE
Currently not in exacerbation, but patient states he has been having mild symptoms such as shortness of breath with exertion for the last 2 weeks  SpO2 97% on room air today  He has not used any medication for asthma for the last 1 5 years  No new environmental triggers such as new job or new home, or new animals in the home  May be secondary to seasonal changes      - Started patient on Symbicort 2 puffs BID and counseled to rinse mouth afterwards  - Albuterol inhaler PRN  - Avoid exposure to tobacco smoke, polluted air and other known asthma triggers

## 2021-09-29 NOTE — PROGRESS NOTES
Assessment/Plan:    Benign essential hypertension  BP Today: 138/76  At goal per JNC-8 guidelines  - Continue Zestril 40 mg daily   - Adhere to low salt diet and exercise  - Weight loss counseling to prevent high BP readings  Moderate persistent asthma without complication  Currently not in exacerbation, but patient states he has been having mild symptoms such as shortness of breath with exertion for the last 2 weeks  SpO2 97% on room air today  He has not used any medication for asthma for the last 1 5 years  No new environmental triggers such as new job or new home, or new animals in the home  May be secondary to seasonal changes      - Started patient on Symbicort 2 puffs BID and counseled to rinse mouth afterwards  - Albuterol inhaler PRN  - Avoid exposure to tobacco smoke, polluted air and other known asthma triggers  Mixed hyperlipidemia  Lipid Panel from 3/2021: total cholesterol 208, triglycerides 116, HDL 50,   Well controlled at this time  - Continue Lipitor 20 mg daily  Schizophrenia [facility])  He follows with Psychiatry and sees them every 2 months  He also sees a therapist every 1 month  - Continue following with psychiatry and therapist     Polyuria  Patient has a history of polyuria, as well as nocturia, which has been present for the past 6 months  He denies increased urgency, dribbling of urine, slow urinary stream, or straining  With patient's age and symptomatology, most likely secondary to BPH      - Urinalysis ordered  - PSA ordered  - KUB with PVR ordered  - Trial of Flomax ordered  Diagnoses and all orders for this visit:    Flu vaccine need  -     influenza vaccine, high-dose, PF 0 7 mL (FLUZONE HIGH-DOSE)    Benign essential hypertension    Moderate persistent asthma without complication  -     budesonide-formoterol (SYMBICORT) 80-4 5 MCG/ACT inhaler; Inhale 2 puffs 2 (two) times a day Rinse mouth after use    -     albuterol (ProAir HFA) 90 mcg/act inhaler; Inhale 2 puffs every 6 (six) hours as needed for wheezing    Uncomplicated asthma, unspecified asthma severity, unspecified whether persistent  -     albuterol (ProAir HFA) 90 mcg/act inhaler; Inhale 2 puffs every 6 (six) hours as needed for wheezing    Schizophrenia, unspecified type (HCC)    Benign prostatic hyperplasia with urinary frequency  -     tamsulosin (FLOMAX) 0 4 mg; Take 1 capsule (0 4 mg total) by mouth daily with dinner    Polyuria  -     Urinalysis with microscopic  -     US kidney and bladder; Future          Subjective:      Patient ID: Joslyn Marquez is a 77 y o  male  This is a very pleasant 77 y o  male with PMH of HTN, HLD, asthma, and schizophrenia who presents to the clinic for management of their chronic medical conditions  Patient's medical conditions are stable unless noted otherwise above  Patient has not had any recent hospitalizations, or medical emergencies since last visit  Patient has no further complaints other than what is mentioned in the review of systems  He is getting his flu shot today  The following portions of the patient's history were reviewed and updated as appropriate: allergies, current medications, past family history, past medical history, past social history, past surgical history and problem list     Review of Systems   Constitutional: Negative for activity change, appetite change, chills, fatigue and fever  Respiratory: Positive for cough and shortness of breath  Cardiovascular: Negative for chest pain, palpitations and leg swelling  Gastrointestinal: Negative for abdominal pain, constipation, diarrhea, nausea and vomiting  Genitourinary: Positive for frequency  Musculoskeletal: Negative for back pain and gait problem  Neurological: Negative for dizziness, seizures, weakness, light-headedness and headaches           Objective:      /60 (BP Location: Left arm, Patient Position: Sitting, Cuff Size: Standard)   Pulse 105   Temp Lamar Diver ) 97 4 °F (36 3 °C) (Temporal)   Resp 16   Ht 5' 7" (1 702 m)   Wt 78 9 kg (174 lb)   SpO2 97%   BMI 27 25 kg/m²          Physical Exam  Vitals reviewed  Constitutional:       General: He is not in acute distress  Appearance: He is well-developed  He is not diaphoretic  HENT:      Head: Normocephalic and atraumatic  Eyes:      Conjunctiva/sclera: Conjunctivae normal    Cardiovascular:      Rate and Rhythm: Normal rate and regular rhythm  Heart sounds: Normal heart sounds  No murmur heard  No friction rub  No gallop  Pulmonary:      Effort: Pulmonary effort is normal  No respiratory distress  Breath sounds: Normal breath sounds  No wheezing or rales  Abdominal:      General: Bowel sounds are normal  There is no distension  Palpations: Abdomen is soft  There is no mass  Tenderness: There is no abdominal tenderness  There is no guarding  Musculoskeletal:         General: No tenderness or deformity  Normal range of motion  Cervical back: Normal range of motion  Skin:     General: Skin is warm and dry  Neurological:      Mental Status: He is alert and oriented to person, place, and time             Dwain Rajput MD  9/29/2021

## 2021-09-29 NOTE — ASSESSMENT & PLAN NOTE
He follows with Psychiatry and sees them every 2 months  He also sees a therapist every 1 month       - Continue following with psychiatry and therapist

## 2021-10-16 DIAGNOSIS — I10 HYPERTENSION, UNSPECIFIED TYPE: ICD-10-CM

## 2021-10-16 RX ORDER — LISINOPRIL 40 MG/1
TABLET ORAL
Qty: 30 TABLET | Refills: 2 | Status: SHIPPED | OUTPATIENT
Start: 2021-10-16 | End: 2022-01-19

## 2021-11-30 DIAGNOSIS — E78.5 HYPERLIPIDEMIA, UNSPECIFIED HYPERLIPIDEMIA TYPE: ICD-10-CM

## 2021-11-30 RX ORDER — ATORVASTATIN CALCIUM 20 MG/1
TABLET, FILM COATED ORAL
Qty: 30 TABLET | Refills: 2 | Status: SHIPPED | OUTPATIENT
Start: 2021-11-30 | End: 2022-04-05

## 2021-12-03 ENCOUNTER — OFFICE VISIT (OUTPATIENT)
Dept: FAMILY MEDICINE CLINIC | Facility: CLINIC | Age: 66
End: 2021-12-03

## 2021-12-03 VITALS
TEMPERATURE: 97.8 F | OXYGEN SATURATION: 97 % | HEART RATE: 80 BPM | RESPIRATION RATE: 18 BRPM | SYSTOLIC BLOOD PRESSURE: 128 MMHG | BODY MASS INDEX: 27.75 KG/M2 | DIASTOLIC BLOOD PRESSURE: 82 MMHG | WEIGHT: 176.8 LBS | HEIGHT: 67 IN

## 2021-12-03 DIAGNOSIS — F20.9 SCHIZOPHRENIA, UNSPECIFIED TYPE (HCC): ICD-10-CM

## 2021-12-03 DIAGNOSIS — E78.2 MIXED HYPERLIPIDEMIA: ICD-10-CM

## 2021-12-03 DIAGNOSIS — I10 BENIGN ESSENTIAL HYPERTENSION: ICD-10-CM

## 2021-12-03 DIAGNOSIS — J45.40 MODERATE PERSISTENT ASTHMA WITHOUT COMPLICATION: Primary | ICD-10-CM

## 2021-12-03 PROCEDURE — 99213 OFFICE O/P EST LOW 20 MIN: CPT | Performed by: FAMILY MEDICINE

## 2021-12-03 PROCEDURE — 1036F TOBACCO NON-USER: CPT | Performed by: FAMILY MEDICINE

## 2021-12-03 PROCEDURE — 3074F SYST BP LT 130 MM HG: CPT | Performed by: FAMILY MEDICINE

## 2021-12-03 PROCEDURE — 3079F DIAST BP 80-89 MM HG: CPT | Performed by: FAMILY MEDICINE

## 2021-12-03 PROCEDURE — 1160F RVW MEDS BY RX/DR IN RCRD: CPT | Performed by: FAMILY MEDICINE

## 2021-12-03 PROCEDURE — 3008F BODY MASS INDEX DOCD: CPT | Performed by: FAMILY MEDICINE

## 2021-12-25 DIAGNOSIS — J45.40 MODERATE PERSISTENT ASTHMA WITHOUT COMPLICATION: ICD-10-CM

## 2021-12-25 DIAGNOSIS — N40.1 BENIGN PROSTATIC HYPERPLASIA WITH URINARY FREQUENCY: ICD-10-CM

## 2021-12-25 DIAGNOSIS — J45.909 UNCOMPLICATED ASTHMA, UNSPECIFIED ASTHMA SEVERITY, UNSPECIFIED WHETHER PERSISTENT: ICD-10-CM

## 2021-12-25 DIAGNOSIS — R35.0 BENIGN PROSTATIC HYPERPLASIA WITH URINARY FREQUENCY: ICD-10-CM

## 2021-12-28 RX ORDER — ALBUTEROL SULFATE 90 UG/1
AEROSOL, METERED RESPIRATORY (INHALATION)
Qty: 8.5 G | Refills: 3 | Status: SHIPPED | OUTPATIENT
Start: 2021-12-28

## 2021-12-28 RX ORDER — TAMSULOSIN HYDROCHLORIDE 0.4 MG/1
CAPSULE ORAL
Qty: 90 CAPSULE | Refills: 0 | Status: SHIPPED | OUTPATIENT
Start: 2021-12-28 | End: 2022-04-05

## 2021-12-30 DIAGNOSIS — J45.40 MODERATE PERSISTENT ASTHMA WITHOUT COMPLICATION: ICD-10-CM

## 2021-12-30 RX ORDER — DILTIAZEM HYDROCHLORIDE 60 MG/1
TABLET, FILM COATED ORAL
Qty: 10.2 G | Refills: 2 | Status: SHIPPED | OUTPATIENT
Start: 2021-12-30 | End: 2022-04-27 | Stop reason: SDUPTHER

## 2022-01-19 DIAGNOSIS — I10 HYPERTENSION, UNSPECIFIED TYPE: ICD-10-CM

## 2022-01-19 RX ORDER — LISINOPRIL 40 MG/1
TABLET ORAL
Qty: 30 TABLET | Refills: 2 | Status: SHIPPED | OUTPATIENT
Start: 2022-01-19 | End: 2022-04-27 | Stop reason: SDUPTHER

## 2022-02-01 ENCOUNTER — TELEPHONE (OUTPATIENT)
Dept: PREADMISSION TESTING | Facility: HOSPITAL | Age: 67
End: 2022-02-01

## 2022-02-09 ENCOUNTER — ANESTHESIA (OUTPATIENT)
Dept: ANESTHESIOLOGY | Facility: HOSPITAL | Age: 67
End: 2022-02-09

## 2022-02-09 ENCOUNTER — ANESTHESIA EVENT (OUTPATIENT)
Dept: ANESTHESIOLOGY | Facility: HOSPITAL | Age: 67
End: 2022-02-09

## 2022-02-09 NOTE — ANESTHESIA PREPROCEDURE EVALUATION
Procedure:  PRE-OP ONLY    Relevant Problems   CARDIO   (+) Benign essential hypertension   (+) Mixed hyperlipidemia      GI/HEPATIC   (+) GERD (gastroesophageal reflux disease)   (+) 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HCC)      HEMATOLOGY   (+) Iron deficiency anemia   (+) Other dietary vitamin B12 deficiency anemia      NEURO/PSYCH   (+) Numbness and tingling in both hands   (+) Schizophrenia (HCC)      PULMONARY   (+) Moderate persistent asthma without complication      Other   (+) Depressed mood   (+) Overweight (BMI 25 0-29 9)   (+) Polyuria             Anesthesia Plan  ASA Score- 3     Anesthesia Type- IV sedation with anesthesia with ASA Monitors  Additional Monitors:   Airway Plan:           Plan Factors-    Chart reviewed  EKG reviewed  Existing labs reviewed  Patient summary reviewed  Induction-     Postoperative Plan-     Informed Consent- Anesthetic plan and risks discussed with patient  I personally reviewed this patient with the CRNA  Discussed and agreed on the Anesthesia Plan with the CRNA               Lab Results   Component Value Date    HGBA1C 6 0 01/21/2019       Lab Results   Component Value Date     06/11/2018    K 4 2 03/18/2021    CL 99 03/18/2021    CO2 32 (H) 03/18/2021    ANIONGAP 12 06/11/2018    BUN 15 03/18/2021    CREATININE 1 11 03/18/2021    GLUCOSE 101 (H) 06/11/2018    GLUF 108 (H) 03/18/2021    CALCIUM 9 1 03/18/2021    AST 23 03/18/2021    ALT 16 03/18/2021    ALKPHOS 58 03/18/2021    PROT 7 1 06/11/2018    BILITOT 0 3 06/11/2018    EGFR 69 03/18/2021       Lab Results   Component Value Date    WBC 8 00 03/18/2021    HGB 14 6 03/18/2021    HCT 45 6 03/18/2021    MCV 82 03/18/2021     03/18/2021    Normal sinus rhythm  Normal ECG  No previous ECGs available  Confirmed by Ana Nolasco ([id number]) on 2/25/2020 11:01:09 AM

## 2022-02-10 ENCOUNTER — TELEPHONE (OUTPATIENT)
Dept: GASTROENTEROLOGY | Facility: HOSPITAL | Age: 67
End: 2022-02-10

## 2022-02-10 ENCOUNTER — HOSPITAL ENCOUNTER (OUTPATIENT)
Dept: INFUSION CENTER | Facility: HOSPITAL | Age: 67
Discharge: HOME | End: 2022-02-10
Payer: MEDICARE

## 2022-02-10 DIAGNOSIS — C19 RECTOSIGMOID JUNCTION CARCINOMA (HCC): ICD-10-CM

## 2022-02-10 DIAGNOSIS — Z45.2 ENCOUNTER FOR CENTRAL LINE CARE: Primary | ICD-10-CM

## 2022-02-10 PROCEDURE — 96523 IRRIG DRUG DELIVERY DEVICE: CPT

## 2022-02-10 NOTE — PROGRESS NOTES
Port flushed, brisk blood return noted  Patient offers no complaints  Next appointment verified, AVS provided

## 2022-02-11 ENCOUNTER — HOSPITAL ENCOUNTER (OUTPATIENT)
Dept: GASTROENTEROLOGY | Facility: HOSPITAL | Age: 67
Setting detail: OUTPATIENT SURGERY
Discharge: HOME | End: 2022-02-11
Attending: COLON & RECTAL SURGERY | Admitting: COLON & RECTAL SURGERY
Payer: MEDICARE

## 2022-02-11 ENCOUNTER — ANESTHESIA (OUTPATIENT)
Dept: GASTROENTEROLOGY | Facility: HOSPITAL | Age: 67
End: 2022-02-11

## 2022-02-11 ENCOUNTER — ANESTHESIA EVENT (OUTPATIENT)
Dept: GASTROENTEROLOGY | Facility: HOSPITAL | Age: 67
End: 2022-02-11

## 2022-02-11 VITALS
OXYGEN SATURATION: 100 % | TEMPERATURE: 97.6 F | RESPIRATION RATE: 18 BRPM | SYSTOLIC BLOOD PRESSURE: 122 MMHG | HEART RATE: 68 BPM | DIASTOLIC BLOOD PRESSURE: 74 MMHG

## 2022-02-11 DIAGNOSIS — Z85.038 PERSONAL HISTORY OF OTHER MALIGNANT NEOPLASM OF LARGE INTESTINE: ICD-10-CM

## 2022-02-11 DIAGNOSIS — Z12.11 ENCOUNTER FOR SCREENING FOR MALIGNANT NEOPLASM OF COLON: ICD-10-CM

## 2022-02-11 PROCEDURE — 88305 TISSUE EXAM BY PATHOLOGIST: CPT | Performed by: PATHOLOGY

## 2022-02-11 RX ORDER — PROPOFOL 10 MG/ML
INJECTION, EMULSION INTRAVENOUS AS NEEDED
Status: DISCONTINUED | OUTPATIENT
Start: 2022-02-11 | End: 2022-02-11

## 2022-02-11 RX ORDER — LIDOCAINE HYDROCHLORIDE 10 MG/ML
INJECTION, SOLUTION EPIDURAL; INFILTRATION; INTRACAUDAL; PERINEURAL AS NEEDED
Status: DISCONTINUED | OUTPATIENT
Start: 2022-02-11 | End: 2022-02-11

## 2022-02-11 RX ORDER — SODIUM CHLORIDE 9 MG/ML
50 INJECTION, SOLUTION INTRAVENOUS CONTINUOUS
Status: DISCONTINUED | OUTPATIENT
Start: 2022-02-11 | End: 2022-02-15 | Stop reason: HOSPADM

## 2022-02-11 RX ADMIN — PROPOFOL 50 MG: 10 INJECTION, EMULSION INTRAVENOUS at 08:58

## 2022-02-11 RX ADMIN — LIDOCAINE HYDROCHLORIDE 50 MG: 10 INJECTION, SOLUTION EPIDURAL; INFILTRATION; INTRACAUDAL; PERINEURAL at 08:46

## 2022-02-11 RX ADMIN — SODIUM CHLORIDE 50 ML/HR: 0.9 INJECTION, SOLUTION INTRAVENOUS at 07:25

## 2022-02-11 RX ADMIN — PROPOFOL 50 MG: 10 INJECTION, EMULSION INTRAVENOUS at 08:53

## 2022-02-11 RX ADMIN — PROPOFOL 80 MG: 10 INJECTION, EMULSION INTRAVENOUS at 08:46

## 2022-02-11 RX ADMIN — PROPOFOL 20 MG: 10 INJECTION, EMULSION INTRAVENOUS at 08:50

## 2022-02-11 NOTE — DISCHARGE INSTRUCTIONS
[facility] [address], [address] [place] [address] [address]  Phone: [phone]    DISCHARGE INSTRUCTIONS:    1   ___ Complete Exam - Normal    2   ___ Exam normal, but entire colon not seen  We will discuss this with you  3   ___ Polyp(s) removed by "burning" - NO pathology report will follow    4   __1_ Polyp(s) removed by excision  Pathology report will be available in 4-5 days   Someone from our office will call you with results  5   ___ Exam prompted biopsies  Pathology report will be available in 4-5 days   Someone from our office will call you with results  6   ___ Exam demonstrated findings that need treatment  Prescriptions will be   Given to you  Return to our office in ____ weeks  Please call for appt  7   ___ Original office visit or colonoscopy findings necessitate an office visit  Please call to set up a new appointment    8   ___ Medication  __________________________________________        [address]:    - Go straight home and rest today    - No driving or drinking alcohol for 24 hours    - Resume regular diet and medications unless otherwise instructed  Coumadin and Plavix are blood thinners  You can resume these medications on __________      IF YOU ARE HAVING ANY FEVER, BLEEDING OR PERSISTENT PAIN IN THE ABDOMEN, PLEASE CALL OUR OFFICE ANY DAY OR TIME  [phone]

## 2022-02-11 NOTE — ANESTHESIA PREPROCEDURE EVALUATION
Procedure:  COLONOSCOPY    Relevant Problems   CARDIO   (+) Benign essential hypertension   (+) Mixed hyperlipidemia      GI/HEPATIC   (+) GERD (gastroesophageal reflux disease)   (+) 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HCC)      HEMATOLOGY   (+) Iron deficiency anemia   (+) Other dietary vitamin B12 deficiency anemia      NEURO/PSYCH   (+) Numbness and tingling in both hands   (+) Schizophrenia (HCC)      PULMONARY   (+) Moderate persistent asthma without complication        Physical Exam    Airway    Mallampati score: II  TM Distance: >3 FB  Neck ROM: full     Dental   upper dentures and lower dentures,     Cardiovascular  Cardiovascular exam normal    Pulmonary  Pulmonary exam normal     Other Findings        Anesthesia Plan  ASA Score- 3     Anesthesia Type- IV sedation with anesthesia with ASA Monitors  Additional Monitors:   Airway Plan:           Plan Factors-Exercise tolerance (METS): >4 METS  Chart reviewed  EKG reviewed  Existing labs reviewed  Patient summary reviewed  Patient is not a current smoker  Patient not instructed to abstain from smoking on day of procedure  Patient did not smoke on day of surgery  Induction-     Postoperative Plan-     Informed Consent- Anesthetic plan and risks discussed with patient  I personally reviewed this patient with the CRNA  Discussed and agreed on the Anesthesia Plan with the CRNA               Lab Results   Component Value Date    HGBA1C 6 0 01/21/2019       Lab Results   Component Value Date     06/11/2018    K 4 2 03/18/2021    CL 99 03/18/2021    CO2 32 (H) 03/18/2021    ANIONGAP 12 06/11/2018    BUN 15 03/18/2021    CREATININE 1 11 03/18/2021    GLUCOSE 101 (H) 06/11/2018    GLUF 108 (H) 03/18/2021    CALCIUM 9 1 03/18/2021    AST 23 03/18/2021    ALT 16 03/18/2021    ALKPHOS 58 03/18/2021    PROT 7 1 06/11/2018    BILITOT 0 3 06/11/2018    EGFR 69 03/18/2021       Lab Results   Component Value Date    WBC 8 00 03/18/2021    HGB 14 6 03/18/2021    HCT 45 6 03/18/2021    MCV 82 03/18/2021     03/18/2021    Normal sinus rhythm  Normal ECG  No previous ECGs available  Confirmed by Raul Blackwell ([id number]) on 2/25/2020 11:01:09 AM

## 2022-02-11 NOTE — ANESTHESIA POSTPROCEDURE EVALUATION
Post-Op Assessment Note    CV Status:  Stable  Pain Score: 0    Pain management: adequate     Mental Status:  Alert and awake   Hydration Status:  Euvolemic   PONV Controlled:  Controlled   Airway Patency:  Patent      Post Op Vitals Reviewed: Yes      Staff: CRNA         No complications documented      BP   93/63   Temp     Pulse 82   Resp 16   SpO2 98

## 2022-02-11 NOTE — INTERVAL H&P NOTE
H&P reviewed  After examining the patient I find no changes in the patients condition since the H&P had been written      Vitals:    02/11/22 0650   BP: 142/85   Pulse: 76   Resp: 20   Temp: (!) 97 3 °F (36 3 °C)   SpO2: 95%

## 2022-04-05 DIAGNOSIS — E78.5 HYPERLIPIDEMIA, UNSPECIFIED HYPERLIPIDEMIA TYPE: ICD-10-CM

## 2022-04-05 DIAGNOSIS — N40.1 BENIGN PROSTATIC HYPERPLASIA WITH URINARY FREQUENCY: ICD-10-CM

## 2022-04-05 DIAGNOSIS — R35.0 BENIGN PROSTATIC HYPERPLASIA WITH URINARY FREQUENCY: ICD-10-CM

## 2022-04-05 RX ORDER — ATORVASTATIN CALCIUM 20 MG/1
TABLET, FILM COATED ORAL
Qty: 30 TABLET | Refills: 2 | Status: SHIPPED | OUTPATIENT
Start: 2022-04-05 | End: 2022-05-03

## 2022-04-05 RX ORDER — TAMSULOSIN HYDROCHLORIDE 0.4 MG/1
CAPSULE ORAL
Qty: 90 CAPSULE | Refills: 0 | Status: SHIPPED | OUTPATIENT
Start: 2022-04-05 | End: 2022-06-24

## 2022-04-22 NOTE — ASSESSMENT & PLAN NOTE
Well controlled with Symbicort and Albuterol PRN  Patient denies SOB or wheezing  Lungs clear on auscultation bilaterally  - Continue Symbicort 2 puffs daily  - Continue Albuterol PRN  - Avoid exposure to tobacco smoke, polluted air and other known asthma triggers

## 2022-04-22 NOTE — ASSESSMENT & PLAN NOTE
BP Today: 136/80  At goal per JNC-8 guidelines  - Continue Zestril 40 mg daily   - Adhere to low salt diet and exercise  - Weight loss counseling to prevent high BP readings

## 2022-04-22 NOTE — PROGRESS NOTES
Assessment/Plan:    Benign essential hypertension  BP Today: 136/80  At goal per JNC-8 guidelines  - Continue Zestril 40 mg daily   - Adhere to low salt diet and exercise  - Weight loss counseling to prevent high BP readings  Moderate persistent asthma without complication  Well controlled with Symbicort and Albuterol PRN  Patient denies SOB or wheezing  Lungs clear on auscultation bilaterally  - Continue Symbicort 2 puffs daily  - Continue Albuterol PRN  - Avoid exposure to tobacco smoke, polluted air and other known asthma triggers  Iron deficiency anemia  Most recent hemoglobin 14 6  Stable  - Continue to monitor  Schizophrenia [facility])  He follows with Psychiatry and sees them every 2 months  He also sees a therapist every 1 month  His next Psychiatry appointment is next month  - Continue following with psychiatry and therapist     Lung nodule  CT chest August 18, 2019 ordered by Hematology Oncology showed: no signs of metastatic disease to the chest, abdomen or pelvis    3 right apical pulmonary groundglass densities without solid components measuring 5 to 6 mm are stable since the 3/5/2018 study  Based on current Fleischner Society 2017 Guidelines on incidental pulmonary nodule, additional followup CT is recommended every 2 years until 5 years of stability is demonstrated  - Patient due for CT Chest, which was ordered today  Patient advised to schedule this  Health Maintenance:  Colonoscopy up to date  Received 2 COVID doses  - Patient has Medicare so has to get TdAP at pharmacy; this was ordered today  - Patient given location to get his COVID booster  Diagnoses and all orders for this visit:    Benign essential hypertension    Moderate persistent asthma without complication  -     Symbicort 80-4 5 MCG/ACT inhaler;  Inhale 2 puffs 2 (two) times a day Rinse mouth after use    Iron deficiency anemia, unspecified iron deficiency anemia type    Cramp in limb  - Basic metabolic panel; Future    Schizophrenia, unspecified type (HCC)    Pulled muscle  -     methocarbamol (ROBAXIN) 500 mg tablet; Take 1 tablet (500 mg total) by mouth 2 (two) times a day as needed for muscle spasms    Encounter for immunization  -     Cancel: TDAP VACCINE GREATER THAN OR EQUAL TO 8YO IM  -     tetanus-diphtheria-acellular pertussis (ADACEL) 5-2-15 5 LF-mcg/0 5 injection; Inject 0 5 mL into a muscle once for 1 dose    Hypertension, unspecified type  -     lisinopril (ZESTRIL) 40 mg tablet; Take 1 tablet (40 mg total) by mouth daily    Lung nodule  -     CT chest w contrast; Future          Subjective:      Patient ID: Agata Stanley is a 77 y o  male  A pleasant 77year old male with PMH of HTN, asthma, and schizophrenia presented to the office today for a follow up on his chronic conditions  There have been no hospitalizations since our last visit  Patient's conditions are stable unless otherwise noted   [employer] [id number] used for this visit  The following portions of the patient's history were reviewed and updated as appropriate: allergies, current medications, past family history, past medical history, past social history, past surgical history and problem list       Review of Systems   Constitutional: Negative for activity change, appetite change, chills, fatigue and fever  Respiratory: Negative for cough and shortness of breath  Cardiovascular: Negative for chest pain, palpitations and leg swelling  Gastrointestinal: Negative for abdominal pain, constipation, diarrhea, nausea and vomiting  Musculoskeletal: Negative for back pain and gait problem  Neurological: Negative for dizziness, seizures, weakness, light-headedness and headaches           Objective:    /80 (BP Location: Left arm, Patient Position: Sitting, Cuff Size: Standard)   Pulse 90   Temp 97 7 °F (36 5 °C) (Temporal)   Resp 18   Ht 5' 7" (1 702 m)   Wt 79 6 kg (175 lb 8 oz)   SpO2 97% BMI 27 49 kg/m²        Physical Exam  Vitals reviewed  Constitutional:       General: He is not in acute distress  Appearance: He is well-developed  He is not diaphoretic  HENT:      Head: Normocephalic and atraumatic  Eyes:      Conjunctiva/sclera: Conjunctivae normal    Cardiovascular:      Rate and Rhythm: Normal rate and regular rhythm  Heart sounds: Normal heart sounds  No murmur heard  No friction rub  No gallop  Pulmonary:      Effort: Pulmonary effort is normal  No respiratory distress  Breath sounds: Normal breath sounds  No wheezing or rales  Abdominal:      General: Bowel sounds are normal  There is no distension  Palpations: Abdomen is soft  There is no mass  Tenderness: There is no abdominal tenderness  There is no guarding  Musculoskeletal:         General: No tenderness or deformity  Normal range of motion  Cervical back: Normal range of motion  Skin:     General: Skin is warm and dry  Neurological:      Mental Status: He is alert and oriented to person, place, and time             Jose Ferrari MD  4/27/2022

## 2022-04-27 ENCOUNTER — OFFICE VISIT (OUTPATIENT)
Dept: FAMILY MEDICINE CLINIC | Facility: CLINIC | Age: 67
End: 2022-04-27

## 2022-04-27 VITALS
DIASTOLIC BLOOD PRESSURE: 80 MMHG | HEART RATE: 90 BPM | HEIGHT: 67 IN | RESPIRATION RATE: 18 BRPM | WEIGHT: 175.5 LBS | OXYGEN SATURATION: 97 % | SYSTOLIC BLOOD PRESSURE: 136 MMHG | BODY MASS INDEX: 27.55 KG/M2 | TEMPERATURE: 97.7 F

## 2022-04-27 DIAGNOSIS — R91.1 LUNG NODULE: ICD-10-CM

## 2022-04-27 DIAGNOSIS — I10 HYPERTENSION, UNSPECIFIED TYPE: ICD-10-CM

## 2022-04-27 DIAGNOSIS — J45.40 MODERATE PERSISTENT ASTHMA WITHOUT COMPLICATION: ICD-10-CM

## 2022-04-27 DIAGNOSIS — F20.9 SCHIZOPHRENIA, UNSPECIFIED TYPE (HCC): ICD-10-CM

## 2022-04-27 DIAGNOSIS — D50.9 IRON DEFICIENCY ANEMIA, UNSPECIFIED IRON DEFICIENCY ANEMIA TYPE: ICD-10-CM

## 2022-04-27 DIAGNOSIS — I10 BENIGN ESSENTIAL HYPERTENSION: Primary | ICD-10-CM

## 2022-04-27 DIAGNOSIS — R25.2 CRAMP IN LIMB: ICD-10-CM

## 2022-04-27 DIAGNOSIS — T14.8XXA PULLED MUSCLE: ICD-10-CM

## 2022-04-27 DIAGNOSIS — Z23 ENCOUNTER FOR IMMUNIZATION: ICD-10-CM

## 2022-04-27 PROCEDURE — 99213 OFFICE O/P EST LOW 20 MIN: CPT | Performed by: FAMILY MEDICINE

## 2022-04-27 RX ORDER — LISINOPRIL 40 MG/1
40 TABLET ORAL DAILY
Qty: 30 TABLET | Refills: 2 | Status: SHIPPED | OUTPATIENT
Start: 2022-04-27 | End: 2022-04-28

## 2022-04-27 RX ORDER — METHOCARBAMOL 500 MG/1
500 TABLET, FILM COATED ORAL 2 TIMES DAILY PRN
Qty: 20 TABLET | Refills: 0 | Status: SHIPPED | OUTPATIENT
Start: 2022-04-27

## 2022-04-27 RX ORDER — DILTIAZEM HYDROCHLORIDE 60 MG/1
2 TABLET, FILM COATED ORAL 2 TIMES DAILY
Qty: 10.2 G | Refills: 2 | Status: SHIPPED | OUTPATIENT
Start: 2022-04-27

## 2022-04-27 NOTE — ASSESSMENT & PLAN NOTE
He follows with Psychiatry and sees them every 2 months  He also sees a therapist every 1 month  His next Psychiatry appointment is next month       - Continue following with psychiatry and therapist

## 2022-04-27 NOTE — ASSESSMENT & PLAN NOTE
CT chest August 18, 2019 ordered by Hematology Oncology showed: no signs of metastatic disease to the chest, abdomen or pelvis    3 right apical pulmonary groundglass densities without solid components measuring 5 to 6 mm are stable since the 3/5/2018 study  Based on current Fleischner Society 2017 Guidelines on incidental pulmonary nodule, additional followup CT is recommended every 2 years until 5 years of stability is demonstrated  - Patient due for CT Chest, which was ordered today  Patient advised to schedule this

## 2022-04-28 DIAGNOSIS — I10 HYPERTENSION, UNSPECIFIED TYPE: ICD-10-CM

## 2022-04-28 RX ORDER — LISINOPRIL 40 MG/1
TABLET ORAL
Qty: 90 TABLET | Refills: 1 | Status: SHIPPED | OUTPATIENT
Start: 2022-04-28

## 2022-04-29 ENCOUNTER — TELEPHONE (OUTPATIENT)
Dept: FAMILY MEDICINE CLINIC | Facility: CLINIC | Age: 67
End: 2022-04-29

## 2022-04-29 NOTE — TELEPHONE ENCOUNTER
Rafa Mcknight Prior Auth form received on 4/29/22  to be completed by PCP  Copy made and placed in PCP folder  Forms to be delivered to PCP mailbox at assigned time

## 2022-05-02 ENCOUNTER — LAB (OUTPATIENT)
Dept: LAB | Facility: HOSPITAL | Age: 67
End: 2022-05-02
Payer: MEDICARE

## 2022-05-02 DIAGNOSIS — R25.2 CRAMP IN LIMB: ICD-10-CM

## 2022-05-02 LAB
ANION GAP SERPL CALCULATED.3IONS-SCNC: 7 MMOL/L (ref 5–14)
BUN SERPL-MCNC: 13 MG/DL (ref 5–25)
CALCIUM SERPL-MCNC: 8.6 MG/DL (ref 8.4–10.2)
CHLORIDE SERPL-SCNC: 101 MMOL/L (ref 97–108)
CO2 SERPL-SCNC: 29 MMOL/L (ref 22–30)
CREAT SERPL-MCNC: 1.06 MG/DL (ref 0.7–1.5)
GFR SERPL CREATININE-BSD FRML MDRD: 72 ML/MIN/1.73SQ M
GLUCOSE P FAST SERPL-MCNC: 95 MG/DL (ref 70–99)
POTASSIUM SERPL-SCNC: 4.2 MMOL/L (ref 3.6–5)
SODIUM SERPL-SCNC: 137 MMOL/L (ref 137–147)

## 2022-05-02 PROCEDURE — 36415 COLL VENOUS BLD VENIPUNCTURE: CPT

## 2022-05-02 PROCEDURE — 80048 BASIC METABOLIC PNL TOTAL CA: CPT

## 2022-05-03 DIAGNOSIS — E78.5 HYPERLIPIDEMIA, UNSPECIFIED HYPERLIPIDEMIA TYPE: ICD-10-CM

## 2022-05-03 RX ORDER — ATORVASTATIN CALCIUM 20 MG/1
TABLET, FILM COATED ORAL
Qty: 90 TABLET | Refills: 1 | Status: SHIPPED | OUTPATIENT
Start: 2022-05-03

## 2022-06-24 DIAGNOSIS — R35.0 BENIGN PROSTATIC HYPERPLASIA WITH URINARY FREQUENCY: ICD-10-CM

## 2022-06-24 DIAGNOSIS — N40.1 BENIGN PROSTATIC HYPERPLASIA WITH URINARY FREQUENCY: ICD-10-CM

## 2022-06-24 RX ORDER — TAMSULOSIN HYDROCHLORIDE 0.4 MG/1
CAPSULE ORAL
Qty: 90 CAPSULE | Refills: 0 | Status: SHIPPED | OUTPATIENT
Start: 2022-06-24

## 2022-07-31 ENCOUNTER — HOSPITAL ENCOUNTER (EMERGENCY)
Facility: HOSPITAL | Age: 67
Discharge: HOME | End: 2022-07-31
Attending: EMERGENCY MEDICINE | Admitting: EMERGENCY MEDICINE
Payer: MEDICARE

## 2022-07-31 VITALS
TEMPERATURE: 98.1 F | BODY MASS INDEX: 28 KG/M2 | WEIGHT: 178.79 LBS | SYSTOLIC BLOOD PRESSURE: 167 MMHG | RESPIRATION RATE: 18 BRPM | OXYGEN SATURATION: 98 % | DIASTOLIC BLOOD PRESSURE: 94 MMHG | HEART RATE: 92 BPM

## 2022-07-31 DIAGNOSIS — T78.40XA ALLERGIC REACTION, INITIAL ENCOUNTER: Primary | ICD-10-CM

## 2022-07-31 PROCEDURE — 99283 EMERGENCY DEPT VISIT LOW MDM: CPT

## 2022-07-31 PROCEDURE — 99284 EMERGENCY DEPT VISIT MOD MDM: CPT | Performed by: EMERGENCY MEDICINE

## 2022-07-31 RX ORDER — DIPHENHYDRAMINE HCL 25 MG
25 TABLET ORAL EVERY 6 HOURS PRN
Qty: 20 TABLET | Refills: 0 | Status: SHIPPED | OUTPATIENT
Start: 2022-07-31 | End: 2022-08-05

## 2022-07-31 RX ORDER — DIPHENHYDRAMINE HCL 25 MG
25 TABLET ORAL ONCE
Status: COMPLETED | OUTPATIENT
Start: 2022-07-31 | End: 2022-07-31

## 2022-07-31 RX ORDER — PREDNISONE 50 MG/1
50 TABLET ORAL DAILY
Qty: 4 TABLET | Refills: 0 | Status: SHIPPED | OUTPATIENT
Start: 2022-07-31 | End: 2022-08-04

## 2022-07-31 RX ADMIN — DIPHENHYDRAMINE HCL 25 MG: 25 TABLET ORAL at 07:54

## 2022-07-31 RX ADMIN — PREDNISONE 50 MG: 20 TABLET ORAL at 07:54

## 2022-07-31 NOTE — ED PROVIDER NOTES
History  Chief Complaint   Patient presents with    Facial Swelling     Pt arrives c/o swollen face since last evening  + itching of face and neck      78-year-old male presented emergency room with facial itching and swelling  Patient notes this started having some itching in the neck and face yesterday  This morning woke up with some periorbital edema  No difficulty breathing no tongue swelling  No lip swelling  No difficulty eating or swallowing  Denies sun exposure, any history of allergies      History provided by:  Patient  Rash  Location:  Face  Facial rash location:  L eye, R eye, L eyelid and R eyelid  Quality: redness and swelling    Severity:  Moderate  Onset quality:  Gradual  Duration:  1 day  Timing:  Constant  Progression:  Worsening  Chronicity:  New  Relieved by:  Nothing  Worsened by:  Nothing  Associated symptoms: no abdominal pain, no fever, no joint pain, no shortness of breath, no sore throat and not vomiting        Prior to Admission Medications   Prescriptions Last Dose Informant Patient Reported? Taking? ARIPiprazole (ABILIFY) 20 MG tablet  Self Yes No   Sig: TK 1 T PO QD     Artificial Tears 1 4 % ophthalmic solution  Self No No   Sig: Administer 1 drop to both eyes as needed for dry eyes   Multiple Vitamin (multivitamin) tablet   No No   Sig: Take 1 tablet by mouth daily   Symbicort 80-4 5 MCG/ACT inhaler   No No   Sig: Inhale 2 puffs 2 (two) times a day Rinse mouth after use   acetaminophen (TYLENOL) 500 mg tablet  Self No No   Sig: Take 1 tablet (500 mg total) by mouth every 6 (six) hours as needed for mild pain or moderate pain   Patient not taking: Reported on 6/29/2021   albuterol (PROVENTIL HFA,VENTOLIN HFA) 90 mcg/act inhaler   No No   Sig: INHALE 2 PUFFS BY MOUTH EVERY 6 HOURS AS NEEDED FOR WHEEZING   aspirin (ECOTRIN LOW STRENGTH) 81 mg EC tablet  Self No No   Sig: Take 1 tablet (81 mg total) by mouth daily   atorvastatin (LIPITOR) 20 mg tablet   No No   Sig: TAKE 1 TABLET(20 MG) BY MOUTH DAILY   benztropine (COGENTIN) 0 5 mg tablet  Self Yes No   Sig: every 24 hours   clonazePAM (KlonoPIN) 1 mg tablet  Self Yes No   Sig: TK 1 T PO BID PRA   Patient not taking: Reported on 6/29/2021   clonazePAM (KlonoPIN) 2 mg tablet  Self Yes No   Sig: TK 1 T PO HS PRN     cyanocobalamin (VITAMIN B-12) 1,000 mcg tablet  Self No No   Sig: Take 1 tablet (1,000 mcg total) by mouth daily   Patient not taking: Reported on 6/29/2021   fluticasone (FLOVENT DISKUS) 50 MCG/BLIST diskus inhaler  Self No No   Sig: Inhale 1 puff 2 (two) times a day   Patient not taking: Reported on 6/29/2021   lidocaine-prilocaine (EMLA) cream  Self Yes No   Patient not taking: Reported on 6/29/2021   lisinopril (ZESTRIL) 40 mg tablet   No No   Sig: TAKE 1 TABLET(40 MG) BY MOUTH DAILY   loratadine (CLARITIN) 10 mg tablet  Self No No   Sig: Take 1 tablet (10 mg total) by mouth daily   methocarbamol (ROBAXIN) 500 mg tablet   No No   Sig: Take 1 tablet (500 mg total) by mouth 2 (two) times a day as needed for muscle spasms   mirtazapine (REMERON) 30 mg tablet  Self Yes No   Sig: TK 1 T PO HS   omeprazole (PriLOSEC) 20 mg delayed release capsule  Self No No   Sig: Take 1 capsule (20 mg total) by mouth daily   Patient not taking: Reported on 6/29/2021   tamsulosin (FLOMAX) 0 4 mg   No No   Sig: TAKE 1 CAPSULE(0 4 MG) BY MOUTH DAILY WITH DINNER   tetrahydrozoline-zinc (VISINE-AC) 0 05-0 25 % ophthalmic solution  Self No No   Sig: Administer 2 drops to both eyes 3 (three) times a day as needed (As needed)   Patient not taking: Reported on 6/29/2021   venlafaxine (EFFEXOR-XR) 150 mg 24 hr capsule  Self Yes No   Sig: TK ONE C PO D   venlafaxine (EFFEXOR-XR) 75 mg 24 hr capsule  Self Yes No   Sig: TK ONE C PO QD WF AND WITH VENLAFAXINE 150 MG   zolpidem (AMBIEN) 10 mg tablet  Self Yes No   Sig: TK 1 T PO HS PRF SLEEP      Facility-Administered Medications: None       Past Medical History:   Diagnosis Date    [facility] ([facility] [address] )  Colon polyp     Depression     Encounter for central line care 12/10/2019    Hypertension     Psychiatric disorder        Past Surgical History:   Procedure Laterality Date    ABDOMINAL SURGERY      PORTACATH PLACEMENT Right        Family History   Problem Relation Age of Onset    No Known Problems Mother     No Known Problems Father      I have reviewed and agree with the history as documented  E-Cigarette/Vaping     E-Cigarette/Vaping Substances     Social History     Tobacco Use    Smoking status: Former Smoker    Smokeless tobacco: Never Used    Tobacco comment: quit 20 years ago    Substance Use Topics    Alcohol use: Yes     Alcohol/week: 2 0 - 3 0 standard drinks     Types: 2 - 3 Cans of beer per week     Comment: weekly    Drug use: No       Review of Systems   Constitutional: Negative for chills and fever  HENT: Negative for ear pain and sore throat  Eyes: Negative for pain and visual disturbance  Respiratory: Negative for cough and shortness of breath  Cardiovascular: Negative for chest pain and palpitations  Gastrointestinal: Negative for abdominal pain and vomiting  Genitourinary: Negative for dysuria and hematuria  Musculoskeletal: Negative for arthralgias and back pain  Skin: Positive for rash  Negative for color change  Neurological: Negative for seizures and syncope  All other systems reviewed and are negative  Physical Exam  Physical Exam  Vitals and nursing note reviewed  Constitutional:       Appearance: He is well-developed  HENT:      Head: Normocephalic and atraumatic  Nose: Nose normal       Mouth/Throat:      Mouth: Mucous membranes are moist    Eyes:      Extraocular Movements: Extraocular movements intact  Conjunctiva/sclera: Conjunctivae normal       Pupils: Pupils are equal, round, and reactive to light        Comments: Mild bilateral periorbital edema and erythema   Cardiovascular:      Rate and Rhythm: Normal rate and regular rhythm  Heart sounds: No murmur heard  Pulmonary:      Effort: Pulmonary effort is normal  No respiratory distress  Breath sounds: Normal breath sounds  Abdominal:      Palpations: Abdomen is soft  Tenderness: There is no abdominal tenderness  Musculoskeletal:      Cervical back: Neck supple  Skin:     General: Skin is warm and dry  Capillary Refill: Capillary refill takes less than 2 seconds  Neurological:      Mental Status: He is alert  Vital Signs  ED Triage Vitals [07/31/22 0736]   Temperature Pulse Respirations Blood Pressure SpO2   98 1 °F (36 7 °C) 92 18 167/94 98 %      Temp Source Heart Rate Source Patient Position - Orthostatic VS BP Location FiO2 (%)   Oral Monitor Sitting Left arm --      Pain Score       --           Vitals:    07/31/22 0736   BP: 167/94   Pulse: 92   Patient Position - Orthostatic VS: Sitting         Visual Acuity      ED Medications  Medications   diphenhydrAMINE (BENADRYL) tablet 25 mg (25 mg Oral Given 7/31/22 0754)   predniSONE tablet 50 mg (50 mg Oral Given 7/31/22 0754)       Diagnostic Studies  Results Reviewed     None                 No orders to display              Procedures  Procedures         ED Course                               SBIRT 22yo+    Flowsheet Row Most Recent Value   SBIRT (23 yo +)    In order to provide better care to our patients, we are screening all of our patients for alcohol and drug use  Would it be okay to ask you these screening questions? No Filed at: 07/31/2022 0748                    MDM  Number of Diagnoses or Management Options  Allergic reaction, initial encounter  Diagnosis management comments: Facial swelling and itching likely secondary to allergic reaction, will treat with prednisone Benadryl, will discharge      Disposition  Final diagnoses:    Allergic reaction, initial encounter     Time reflects when diagnosis was documented in both MDM as applicable and the Disposition within this note     Time User Action Codes Description Comment    7/31/2022  7:52 AM Katelyn Chapa Add [T78 [id number]] Allergic reaction, initial encounter       ED Disposition     ED Disposition   Discharge    Condition   Stable    Date/Time   Sun Jul 31, 2022  7:52 AM    Comment   Evelyn Nugent discharge to home/self care                 Follow-up Information     Follow up With Specialties Details Why [address] [facility] 2nd Floor Family Medicine In 7 days  [address] [facility]  [phone]            Discharge Medication List as of 7/31/2022  7:52 AM      START taking these medications    Details   diphenhydrAMINE (BENADRYL) 25 mg tablet Take 1 tablet (25 mg total) by mouth every 6 (six) hours as needed for itching for up to 5 days, Starting Sun 7/31/2022, Until Fri 8/5/2022 at 2359, Normal      predniSONE 50 mg tablet Take 1 tablet (50 mg total) by mouth daily for 4 days, Starting Sun 7/31/2022, Until Thu 8/4/2022, Normal         CONTINUE these medications which have NOT CHANGED    Details   acetaminophen (TYLENOL) 500 mg tablet Take 1 tablet (500 mg total) by mouth every 6 (six) hours as needed for mild pain or moderate pain, Starting Tue 3/23/2021, Normal      albuterol (PROVENTIL HFA,VENTOLIN HFA) 90 mcg/act inhaler INHALE 2 PUFFS BY MOUTH EVERY 6 HOURS AS NEEDED FOR WHEEZING, Normal      ARIPiprazole (ABILIFY) 20 MG tablet TK 1 T PO QD , Historical Med      Artificial Tears 1 4 % ophthalmic solution Administer 1 drop to both eyes as needed for dry eyes, Starting Tue 3/23/2021, Normal      aspirin (ECOTRIN LOW STRENGTH) 81 mg EC tablet Take 1 tablet (81 mg total) by mouth daily, Starting Thu 3/21/2019, Normal      atorvastatin (LIPITOR) 20 mg tablet TAKE 1 TABLET(20 MG) BY MOUTH DAILY, Normal      benztropine (COGENTIN) 0 5 mg tablet every 24 hours, Historical Med      !! clonazePAM (KlonoPIN) 1 mg tablet TK 1 T PO BID PRA, Historical Med      !! clonazePAM (KlonoPIN) 2 mg tablet TK 1 T PO HS PRN , Historical Med      cyanocobalamin (VITAMIN B-12) 1,000 mcg tablet Take 1 tablet (1,000 mcg total) by mouth daily, Starting Thu 3/21/2019, Normal      fluticasone (FLOVENT DISKUS) 50 MCG/BLIST diskus inhaler Inhale 1 puff 2 (two) times a day, Starting Fri 12/6/2019, Normal      lidocaine-prilocaine (EMLA) cream Starting Thu 4/26/2018, Historical Med      lisinopril (ZESTRIL) 40 mg tablet TAKE 1 TABLET(40 MG) BY MOUTH DAILY, Normal      loratadine (CLARITIN) 10 mg tablet Take 1 tablet (10 mg total) by mouth daily, Starting Thu 3/21/2019, Normal      methocarbamol (ROBAXIN) 500 mg tablet Take 1 tablet (500 mg total) by mouth 2 (two) times a day as needed for muscle spasms, Starting Wed 4/27/2022, Normal      mirtazapine (REMERON) 30 mg tablet TK 1 T PO HS, Historical Med      Multiple Vitamin (multivitamin) tablet Take 1 tablet by mouth daily, Starting Tue 6/29/2021, Normal      omeprazole (PriLOSEC) 20 mg delayed release capsule Take 1 capsule (20 mg total) by mouth daily, Starting Thu 3/21/2019, Normal      Symbicort 80-4 5 MCG/ACT inhaler Inhale 2 puffs 2 (two) times a day Rinse mouth after use, Starting Wed 4/27/2022, Normal      tamsulosin (FLOMAX) 0 4 mg TAKE 1 CAPSULE(0 4 MG) BY MOUTH DAILY WITH DINNER, Normal      tetrahydrozoline-zinc (VISINE-AC) 0 05-0 25 % ophthalmic solution Administer 2 drops to both eyes 3 (three) times a day as needed (As needed), Starting Tue 12/8/2020, Normal      !! venlafaxine (EFFEXOR-XR) 150 mg 24 hr capsule TK ONE C PO D, Historical Med      !! venlafaxine (EFFEXOR-XR) 75 mg 24 hr capsule TK ONE C PO QD WF AND WITH VENLAFAXINE 150 MG, Historical Med      zolpidem (AMBIEN) 10 mg tablet TK 1 T PO HS PRF SLEEP, Historical Med       !! - Potential duplicate medications found  Please discuss with provider  No discharge procedures on file      PDMP Review     None          ED Provider  Electronically Signed by           Anaid Singleton MD  07/31/22 9030

## 2022-12-08 DIAGNOSIS — I10 HYPERTENSION, UNSPECIFIED TYPE: ICD-10-CM

## 2022-12-08 DIAGNOSIS — E78.5 HYPERLIPIDEMIA, UNSPECIFIED HYPERLIPIDEMIA TYPE: ICD-10-CM

## 2022-12-12 ENCOUNTER — APPOINTMENT (OUTPATIENT)
Dept: LAB | Facility: HOSPITAL | Age: 67
End: 2022-12-12

## 2022-12-12 DIAGNOSIS — E88.89 MADELUNG'S NECK (HCC): ICD-10-CM

## 2022-12-12 DIAGNOSIS — Z79.899 ENCOUNTER FOR LONG-TERM (CURRENT) USE OF OTHER MEDICATIONS: ICD-10-CM

## 2022-12-12 LAB
ALBUMIN SERPL BCP-MCNC: 4.3 G/DL (ref 3.5–5)
ALP SERPL-CCNC: 56 U/L (ref 43–122)
ALT SERPL W P-5'-P-CCNC: 19 U/L
ANION GAP SERPL CALCULATED.3IONS-SCNC: 5 MMOL/L (ref 5–14)
AST SERPL W P-5'-P-CCNC: 23 U/L (ref 17–59)
BASOPHILS # BLD AUTO: 0.04 THOUSANDS/ÂΜL (ref 0–0.1)
BASOPHILS NFR BLD AUTO: 1 % (ref 0–1)
BILIRUB SERPL-MCNC: 0.82 MG/DL (ref 0.2–1)
BUN SERPL-MCNC: 14 MG/DL (ref 5–25)
CALCIUM SERPL-MCNC: 9 MG/DL (ref 8.4–10.2)
CHLORIDE SERPL-SCNC: 103 MMOL/L (ref 96–108)
CHOLEST SERPL-MCNC: 214 MG/DL
CO2 SERPL-SCNC: 30 MMOL/L (ref 21–32)
CREAT SERPL-MCNC: 1.1 MG/DL (ref 0.7–1.5)
EOSINOPHIL # BLD AUTO: 0.15 THOUSAND/ÂΜL (ref 0–0.61)
EOSINOPHIL NFR BLD AUTO: 2 % (ref 0–6)
ERYTHROCYTE [DISTWIDTH] IN BLOOD BY AUTOMATED COUNT: 14 % (ref 11.6–15.1)
EST. AVERAGE GLUCOSE BLD GHB EST-MCNC: 120 MG/DL
GFR SERPL CREATININE-BSD FRML MDRD: 69 ML/MIN/1.73SQ M
GLUCOSE P FAST SERPL-MCNC: 94 MG/DL (ref 70–99)
HBA1C MFR BLD: 5.8 %
HCT VFR BLD AUTO: 47.7 % (ref 36.5–49.3)
HDLC SERPL-MCNC: 46 MG/DL
HGB BLD-MCNC: 15.3 G/DL (ref 12–17)
IMM GRANULOCYTES # BLD AUTO: 0.01 THOUSAND/UL (ref 0–0.2)
IMM GRANULOCYTES NFR BLD AUTO: 0 % (ref 0–2)
LDLC SERPL CALC-MCNC: 139 MG/DL
LYMPHOCYTES # BLD AUTO: 2.49 THOUSANDS/ÂΜL (ref 0.6–4.47)
LYMPHOCYTES NFR BLD AUTO: 34 % (ref 14–44)
MCH RBC QN AUTO: 26.4 PG (ref 26.8–34.3)
MCHC RBC AUTO-ENTMCNC: 32.1 G/DL (ref 31.4–37.4)
MCV RBC AUTO: 82 FL (ref 82–98)
MONOCYTES # BLD AUTO: 0.59 THOUSAND/ÂΜL (ref 0.17–1.22)
MONOCYTES NFR BLD AUTO: 8 % (ref 4–12)
NEUTROPHILS # BLD AUTO: 4.03 THOUSANDS/ÂΜL (ref 1.85–7.62)
NEUTS SEG NFR BLD AUTO: 55 % (ref 43–75)
NONHDLC SERPL-MCNC: 168 MG/DL
NRBC BLD AUTO-RTO: 0 /100 WBCS
PLATELET # BLD AUTO: 326 THOUSANDS/UL (ref 149–390)
PMV BLD AUTO: 11 FL (ref 8.9–12.7)
POTASSIUM SERPL-SCNC: 4 MMOL/L (ref 3.5–5.3)
PROT SERPL-MCNC: 7.4 G/DL (ref 6.4–8.4)
RBC # BLD AUTO: 5.8 MILLION/UL (ref 3.88–5.62)
SODIUM SERPL-SCNC: 138 MMOL/L (ref 135–147)
TRIGL SERPL-MCNC: 144 MG/DL
TSH SERPL DL<=0.05 MIU/L-ACNC: 0.22 UIU/ML (ref 0.45–4.5)
WBC # BLD AUTO: 7.31 THOUSAND/UL (ref 4.31–10.16)

## 2022-12-12 RX ORDER — LISINOPRIL 40 MG/1
TABLET ORAL
Qty: 90 TABLET | Refills: 1 | Status: SHIPPED | OUTPATIENT
Start: 2022-12-12

## 2022-12-12 RX ORDER — ATORVASTATIN CALCIUM 20 MG/1
TABLET, FILM COATED ORAL
Qty: 90 TABLET | Refills: 1 | Status: SHIPPED | OUTPATIENT
Start: 2022-12-12

## 2023-01-26 ENCOUNTER — OFFICE VISIT (OUTPATIENT)
Dept: FAMILY MEDICINE CLINIC | Facility: CLINIC | Age: 68
End: 2023-01-26

## 2023-01-26 VITALS
BODY MASS INDEX: 28.19 KG/M2 | OXYGEN SATURATION: 97 % | TEMPERATURE: 97.8 F | HEART RATE: 87 BPM | DIASTOLIC BLOOD PRESSURE: 80 MMHG | RESPIRATION RATE: 14 BRPM | HEIGHT: 67 IN | SYSTOLIC BLOOD PRESSURE: 128 MMHG | WEIGHT: 179.6 LBS

## 2023-01-26 DIAGNOSIS — N18.2 CHRONIC KIDNEY DISEASE (CKD), STAGE II (MILD): ICD-10-CM

## 2023-01-26 DIAGNOSIS — F20.9 SCHIZOPHRENIA, UNSPECIFIED TYPE (HCC): ICD-10-CM

## 2023-01-26 DIAGNOSIS — I10 BENIGN ESSENTIAL HYPERTENSION: Primary | ICD-10-CM

## 2023-01-26 DIAGNOSIS — J45.909 UNCOMPLICATED ASTHMA, UNSPECIFIED ASTHMA SEVERITY, UNSPECIFIED WHETHER PERSISTENT: ICD-10-CM

## 2023-01-26 DIAGNOSIS — R91.1 LUNG NODULE: ICD-10-CM

## 2023-01-26 DIAGNOSIS — E78.2 MIXED HYPERLIPIDEMIA: ICD-10-CM

## 2023-01-26 DIAGNOSIS — Z23 ENCOUNTER FOR IMMUNIZATION: ICD-10-CM

## 2023-01-26 DIAGNOSIS — J45.40 MODERATE PERSISTENT ASTHMA WITHOUT COMPLICATION: ICD-10-CM

## 2023-01-26 PROBLEM — R20.0 NUMBNESS AND TINGLING IN BOTH HANDS: Status: RESOLVED | Noted: 2019-01-21 | Resolved: 2023-01-26

## 2023-01-26 PROBLEM — R20.2 NUMBNESS AND TINGLING IN BOTH HANDS: Status: RESOLVED | Noted: 2019-01-21 | Resolved: 2023-01-26

## 2023-01-26 RX ORDER — DILTIAZEM HYDROCHLORIDE 60 MG/1
2 TABLET, FILM COATED ORAL 2 TIMES DAILY
Qty: 10.2 G | Refills: 2 | Status: SHIPPED | OUTPATIENT
Start: 2023-01-26

## 2023-01-26 RX ORDER — ALBUTEROL SULFATE 90 UG/1
2 AEROSOL, METERED RESPIRATORY (INHALATION) EVERY 6 HOURS PRN
Qty: 8.5 G | Refills: 3 | Status: SHIPPED | OUTPATIENT
Start: 2023-01-26

## 2023-01-26 NOTE — PROGRESS NOTES
Name: Andres Mcmahon      : 1955      MRN: [id number]  Encounter Provider: Nieves Estrada MD  Encounter Date: 2023   Encounter department: [address] [place] [address]     1  Benign essential hypertension  Assessment & Plan:  BP Today: 128/80  At goal per JNC-8 guidelines  - Continue Zestril 40 mg daily   - Adhere to low salt diet and exercise  - Weight loss counseling to prevent high BP readings  Orders:  -     Microalbumin / creatinine urine ratio    2  Moderate persistent asthma without complication  Assessment & Plan:  Well controlled with Symbicort and Albuterol PRN  Patient denies SOB or wheezing  Lungs clear on auscultation bilaterally  - Continue Symbicort 2 puffs daily  - Continue Albuterol PRN  - Refills sent  - Avoid exposure to tobacco smoke, polluted air and other known asthma triggers  Orders:  -     albuterol (PROVENTIL HFA,VENTOLIN HFA) 90 mcg/act inhaler; Inhale 2 puffs every 6 (six) hours as needed for wheezing  -     Symbicort 80-4 5 MCG/ACT inhaler; Inhale 2 puffs 2 (two) times a day Rinse mouth after use    3  Uncomplicated asthma, unspecified asthma severity, unspecified whether persistent  -     albuterol (PROVENTIL HFA,VENTOLIN HFA) 90 mcg/act inhaler; Inhale 2 puffs every 6 (six) hours as needed for wheezing    4  Lung nodule  Assessment & Plan:  CT chest 2019 ordered by Hematology Oncology showed: no signs of metastatic disease to the chest, abdomen or pelvis  3 right apical pulmonary groundglass densities without solid components measuring 5 to 6 mm are stable since the 3/5/2018 study  Based on current Fleischner Society 2017 Guidelines on incidental pulmonary nodule, additional followup CT is recommended every 2 years until 5 years of stability is demonstrated  CT cervical spine from 2023 showed a right upper lobe groundglass nodule measuring up to 12 mm  Repeat CT chest was recommended in 3 months  - Patient was already due for CT Chest, which was ordered at our prior visit and never scheduled  Patient given phone number and advised to schedule this  5  Encounter for immunization  -     Pneumococcal Conjugate Vaccine 20-valent (PCV20)  -     influenza vaccine, high-dose, PF 0 7 mL (FLUZONE HIGH-DOSE)    6  Schizophrenia, unspecified type [facility])  Assessment & Plan:  He follows with Psychiatry and sees them every 2 months  He also sees a therapist every 1 month  He does not know what medications he is taking and states he knows he 'takes one for sleep '  Denies HI/SI     - Continue following with psychiatry and therapist       7  Chronic kidney disease (CKD), stage II (mild)  Assessment & Plan:  Lab Results   Component Value Date    EGFR 69 12/12/2022    EGFR 72 05/02/2022    EGFR 69 03/18/2021    CREATININE 1 10 12/12/2022    CREATININE 1 06 05/02/2022    CREATININE 1 11 03/18/2021   Stable  - Encouraged PO hydration, at least 64 ounces per day  - Avoid nephrotoxic medications  8  Mixed hyperlipidemia  Assessment & Plan:  Lipid Panel from 12/2022: total cholesterol 214, triglycerides 144, HDL 46,   Well controlled at this time  - Continue Lipitor 20 mg daily  Addison Calderon is a 79 y o  male with history of asthma, HTN, HLD who presents today for an ED follow up  Patient was seen in the [facility]  emergency department on 1/9/2023 after having cough, fever, and eye irritation  The eye itchiness had started 2 weeks prior- he denied any injury to eye and does not use contacts  Two days prior to ED arrival, he had developed URI symptoms of cough, congestion, and intermittent chills/fever  He did not take his temperature at home and denied any sick contacts  He had not taken anything for the pain or fevers  He also endorsed a fall and stated he lost consciousness for 2-3 minutes  Of note, he is not on any blood thinners and denied any focal weakness   CBC, CMP, and UA were all within normal limits  He did test positive for COVID-19  Chest x-ray was within normal limits  CT head was negative for any intracranial abnormality  CT cervical spine showed no acute fracture or dislocation, but did show a right upper lobe groundglass nodule measuring up to 12 mm (was seen on previous scan; however, patient never got repeat imaging that was ordered at our last visit)  Patient reported today that from a COVID standpoint, he feels significantly better and his symptoms have resolved at this time  He has no other concerns at this time   312349 used for this visit  Review of Systems   Constitutional: Negative for activity change, appetite change, chills, fatigue and fever  Respiratory: Negative for cough and shortness of breath  Cardiovascular: Negative for chest pain, palpitations and leg swelling  Gastrointestinal: Negative for abdominal pain, constipation, diarrhea, nausea and vomiting  Musculoskeletal: Negative for back pain and gait problem  Neurological: Negative for dizziness, seizures, weakness, light-headedness and headaches  Current Outpatient Medications on File Prior to Visit   Medication Sig   • acetaminophen (TYLENOL) 500 mg tablet Take 1 tablet (500 mg total) by mouth every 6 (six) hours as needed for mild pain or moderate pain (Patient not taking: Reported on 6/29/2021)   • ARIPiprazole (ABILIFY) 20 MG tablet TK 1 T PO QD  • Artificial Tears 1 4 % ophthalmic solution Administer 1 drop to both eyes as needed for dry eyes   • aspirin (ECOTRIN LOW STRENGTH) 81 mg EC tablet Take 1 tablet (81 mg total) by mouth daily   • atorvastatin (LIPITOR) 20 mg tablet TAKE 1 TABLET(20 MG) BY MOUTH DAILY   • benztropine (COGENTIN) 0 5 mg tablet every 24 hours   • clonazePAM (KlonoPIN) 1 mg tablet TK 1 T PO BID PRA (Patient not taking: Reported on 6/29/2021)   • clonazePAM (KlonoPIN) 2 mg tablet TK 1 T PO HS PRN     • cyanocobalamin (VITAMIN B-12) 1,000 mcg tablet Take 1 tablet (1,000 mcg total) by mouth daily (Patient not taking: Reported on 6/29/2021)   • diphenhydrAMINE (BENADRYL) 25 mg tablet Take 1 tablet (25 mg total) by mouth every 6 (six) hours as needed for itching for up to 5 days   • lidocaine-prilocaine (EMLA) cream  (Patient not taking: Reported on 6/29/2021)   • lisinopril (ZESTRIL) 40 mg tablet TAKE 1 TABLET(40 MG) BY MOUTH DAILY   • loratadine (CLARITIN) 10 mg tablet Take 1 tablet (10 mg total) by mouth daily   • methocarbamol (ROBAXIN) 500 mg tablet Take 1 tablet (500 mg total) by mouth 2 (two) times a day as needed for muscle spasms   • mirtazapine (REMERON) 30 mg tablet TK 1 T PO HS   • Multiple Vitamin (multivitamin) tablet Take 1 tablet by mouth daily   • omeprazole (PriLOSEC) 20 mg delayed release capsule Take 1 capsule (20 mg total) by mouth daily (Patient not taking: Reported on 6/29/2021)   • tamsulosin (FLOMAX) 0 4 mg TAKE 1 CAPSULE(0 4 MG) BY MOUTH DAILY WITH DINNER   • tetrahydrozoline-zinc (VISINE-AC) 0 05-0 25 % ophthalmic solution Administer 2 drops to both eyes 3 (three) times a day as needed (As needed) (Patient not taking: Reported on 6/29/2021)   • venlafaxine (EFFEXOR-XR) 150 mg 24 hr capsule TK ONE C PO D   • venlafaxine (EFFEXOR-XR) 75 mg 24 hr capsule TK ONE C PO QD WF AND WITH VENLAFAXINE 150 MG   • zolpidem (AMBIEN) 10 mg tablet TK 1 T PO HS PRF SLEEP   • [DISCONTINUED] albuterol (PROVENTIL HFA,VENTOLIN HFA) 90 mcg/act inhaler INHALE 2 PUFFS BY MOUTH EVERY 6 HOURS AS NEEDED FOR WHEEZING   • [DISCONTINUED] fluticasone (FLOVENT DISKUS) 50 MCG/BLIST diskus inhaler Inhale 1 puff 2 (two) times a day (Patient not taking: Reported on 6/29/2021)   • [DISCONTINUED] Symbicort 80-4 5 MCG/ACT inhaler Inhale 2 puffs 2 (two) times a day Rinse mouth after use       Objective     /80 (BP Location: Left arm, Patient Position: Sitting, Cuff Size: Standard)   Pulse 87   Temp 97 8 °F (36 6 °C) (Temporal)   Resp 14   Ht 5' 7" (1 702 m)   Wt 81 5 kg (179 lb 9 6 oz)   SpO2 97%   BMI 28 13 kg/m²     Physical Exam  Vitals reviewed  Constitutional:       General: He is not in acute distress  Appearance: He is well-developed  He is not diaphoretic  HENT:      Head: Normocephalic and atraumatic  Mouth/Throat:      Mouth: Mucous membranes are moist       Pharynx: Oropharynx is clear  Eyes:      Conjunctiva/sclera: Conjunctivae normal    Cardiovascular:      Rate and Rhythm: Normal rate and regular rhythm  Heart sounds: Normal heart sounds  No murmur heard  No friction rub  No gallop  Pulmonary:      Effort: Pulmonary effort is normal  No respiratory distress  Breath sounds: Normal breath sounds  No wheezing or rales  Abdominal:      General: Bowel sounds are normal  There is no distension  Palpations: Abdomen is soft  There is no mass  Tenderness: There is no abdominal tenderness  There is no guarding  Musculoskeletal:         General: No tenderness or deformity  Normal range of motion  Cervical back: Normal range of motion  Skin:     General: Skin is warm and dry  Neurological:      Mental Status: He is alert and oriented to person, place, and time         Tamanna Yuan MD

## 2023-01-26 NOTE — ASSESSMENT & PLAN NOTE
BP Today: 128/80  At goal per JNC-8 guidelines  - Continue Zestril 40 mg daily   - Adhere to low salt diet and exercise  - Weight loss counseling to prevent high BP readings 
CT chest August 18, 2019 ordered by Hematology Oncology showed: no signs of metastatic disease to the chest, abdomen or pelvis  3 right apical pulmonary groundglass densities without solid components measuring 5 to 6 mm are stable since the 3/5/2018 study  Based on current Fleischner Society 2017 Guidelines on incidental pulmonary nodule, additional followup CT is recommended every 2 years until 5 years of stability is demonstrated  CT cervical spine from 1/2023 showed a right upper lobe groundglass nodule measuring up to 12 mm  Repeat CT chest was recommended in 3 months  - Patient was already due for CT Chest, which was ordered at our prior visit and never scheduled  Patient given phone number and advised to schedule this 
He follows with Psychiatry and sees them every 2 months  He also sees a therapist every 1 month     He does not know what medications he is taking and states he knows he 'takes one for sleep '  Denies HI/SI     - Continue following with psychiatry and therapist 
Lab Results   Component Value Date    EGFR 69 12/12/2022    EGFR 72 05/02/2022    EGFR 69 03/18/2021    CREATININE 1 10 12/12/2022    CREATININE 1 06 05/02/2022    CREATININE 1 11 03/18/2021   Stable  - Encouraged PO hydration, at least 64 ounces per day  - Avoid nephrotoxic medications 
Lipid Panel from 12/2022: total cholesterol 214, triglycerides 144, HDL 46,   Well controlled at this time  - Continue Lipitor 20 mg daily 
Well controlled with Symbicort and Albuterol PRN  Patient denies SOB or wheezing  Lungs clear on auscultation bilaterally  - Continue Symbicort 2 puffs daily  - Continue Albuterol PRN  - Refills sent  - Avoid exposure to tobacco smoke, polluted air and other known asthma triggers 
I will SWITCH the dose or number of times a day I take the medications listed below when I get home from the hospital:  None

## 2023-01-28 DIAGNOSIS — R35.0 BENIGN PROSTATIC HYPERPLASIA WITH URINARY FREQUENCY: ICD-10-CM

## 2023-01-28 DIAGNOSIS — N40.1 BENIGN PROSTATIC HYPERPLASIA WITH URINARY FREQUENCY: ICD-10-CM

## 2023-01-30 RX ORDER — TAMSULOSIN HYDROCHLORIDE 0.4 MG/1
CAPSULE ORAL
Qty: 90 CAPSULE | Refills: 0 | Status: SHIPPED | OUTPATIENT
Start: 2023-01-30

## 2023-02-04 ENCOUNTER — HOSPITAL ENCOUNTER (OUTPATIENT)
Dept: CT IMAGING | Facility: HOSPITAL | Age: 68
Discharge: HOME | End: 2023-02-04

## 2023-02-04 DIAGNOSIS — R91.1 LUNG NODULE: ICD-10-CM

## 2023-02-12 NOTE — RESULT ENCOUNTER NOTE
Hello, please call the patient and inform him that the nodules in his right lung are stable and have not increased in size since imaging done in 2018  These are almost certainly benign  Patient prefers Vietnamese  Thank you!

## 2023-02-13 ENCOUNTER — TELEPHONE (OUTPATIENT)
Dept: FAMILY MEDICINE CLINIC | Facility: CLINIC | Age: 68
End: 2023-02-13

## 2023-02-13 NOTE — TELEPHONE ENCOUNTER
02/13/23    Form: [facility] Generic Substitution Request     FAXED ON 02/13/23 TO [facility] Generic Substitution Request  at [phone]  FAX CONFIRMATION RECEIVED        Form scanned into pt chart

## 2023-04-28 ENCOUNTER — TELEPHONE (OUTPATIENT)
Dept: FAMILY MEDICINE CLINIC | Facility: CLINIC | Age: 68
End: 2023-04-28

## 2023-04-28 DIAGNOSIS — R35.0 BENIGN PROSTATIC HYPERPLASIA WITH URINARY FREQUENCY: ICD-10-CM

## 2023-04-28 DIAGNOSIS — N40.1 BENIGN PROSTATIC HYPERPLASIA WITH URINARY FREQUENCY: ICD-10-CM

## 2023-04-28 RX ORDER — TAMSULOSIN HYDROCHLORIDE 0.4 MG/1
CAPSULE ORAL
Qty: 90 CAPSULE | Refills: 0 | Status: SHIPPED | OUTPATIENT
Start: 2023-04-28

## 2023-06-20 DIAGNOSIS — N40.1 BENIGN PROSTATIC HYPERPLASIA WITH URINARY FREQUENCY: ICD-10-CM

## 2023-06-20 DIAGNOSIS — R35.0 BENIGN PROSTATIC HYPERPLASIA WITH URINARY FREQUENCY: ICD-10-CM

## 2023-06-20 NOTE — TELEPHONE ENCOUNTER
06/20/23    Pt came into the office today requesting refill in the following med:     tamsulosin (FLOMAX) 0 4 mg       Pt has been scheduled with a new PCP for 06/30/23  Any questions, please contact Pt

## 2023-06-21 RX ORDER — TAMSULOSIN HYDROCHLORIDE 0.4 MG/1
0.4 CAPSULE ORAL
Qty: 90 CAPSULE | Refills: 0 | Status: SHIPPED | OUTPATIENT
Start: 2023-06-21 | End: 2023-06-30 | Stop reason: SDUPTHER

## 2023-06-27 DIAGNOSIS — E78.5 HYPERLIPIDEMIA, UNSPECIFIED HYPERLIPIDEMIA TYPE: ICD-10-CM

## 2023-06-29 RX ORDER — ATORVASTATIN CALCIUM 20 MG/1
TABLET, FILM COATED ORAL
Qty: 90 TABLET | Refills: 1 | Status: SHIPPED | OUTPATIENT
Start: 2023-06-29 | End: 2023-06-30 | Stop reason: SDUPTHER

## 2023-06-29 NOTE — ASSESSMENT & PLAN NOTE
- As of January, patient's creatinine within normal limits and GFR of 80  Condition overall stable    - Continue management of hypertension  - Avoid NSAIDs and other nephrotoxic agents, encourage good PO hydration  - Repeat BMP prior to next visit for surveillance

## 2023-06-29 NOTE — ASSESSMENT & PLAN NOTE
- Continue medications as prescribed by Psychiatry  - Continue follow-up with specialist every 2 months

## 2023-06-29 NOTE — ASSESSMENT & PLAN NOTE
- BP today: 130/80  - Patient on Lisinopril 40 mg daily  - Educated on low-salt diet, smoking avoidance and routine of exercises  - Continue current regimen

## 2023-06-29 NOTE — ASSESSMENT & PLAN NOTE
- Patient on Lipitor 20 mg daily, however has not been taking  - LDL not at goal as of December 2022, with ASCVD risk of 18 5%  - Recommend patient to repeat lipid panel prior to next visit  - Counseled on lifestyle changes, such as routine of exercises and healthier diet  - Will refill medication to patient

## 2023-06-29 NOTE — ASSESSMENT & PLAN NOTE
- Anemia resolved, based on most recent CBCs  - Patient with history of colorectal carcinoma  - Most recent colonoscopy done in 2022 shows serrated adenoma  - Continue surveillance with repeat colonoscopy in 2025

## 2023-06-29 NOTE — ASSESSMENT & PLAN NOTE
- Patient had a repeat CT chest, which showed stable lung nodule in comparison to prior study, most likely benign

## 2023-06-29 NOTE — ASSESSMENT & PLAN NOTE
- Currently on Symbicort 2 puffs daily and Albuterol PRN  - Last use of Albuterol: 1-2 weeks ago  - Well-controlled, denies SOB  - Does not smoke cigarettes  - Continue current management

## 2023-06-30 ENCOUNTER — HOSPITAL ENCOUNTER (OUTPATIENT)
Dept: RADIOLOGY | Facility: HOSPITAL | Age: 68
End: 2023-06-30
Payer: MEDICARE

## 2023-06-30 ENCOUNTER — OFFICE VISIT (OUTPATIENT)
Dept: FAMILY MEDICINE CLINIC | Facility: CLINIC | Age: 68
End: 2023-06-30

## 2023-06-30 VITALS
DIASTOLIC BLOOD PRESSURE: 80 MMHG | TEMPERATURE: 96.6 F | BODY MASS INDEX: 27.78 KG/M2 | HEIGHT: 67 IN | RESPIRATION RATE: 16 BRPM | WEIGHT: 177 LBS | SYSTOLIC BLOOD PRESSURE: 130 MMHG | OXYGEN SATURATION: 99 % | HEART RATE: 78 BPM

## 2023-06-30 DIAGNOSIS — N18.2 CHRONIC KIDNEY DISEASE (CKD), STAGE II (MILD): ICD-10-CM

## 2023-06-30 DIAGNOSIS — E78.2 MIXED HYPERLIPIDEMIA: ICD-10-CM

## 2023-06-30 DIAGNOSIS — D50.9 IRON DEFICIENCY ANEMIA, UNSPECIFIED IRON DEFICIENCY ANEMIA TYPE: ICD-10-CM

## 2023-06-30 DIAGNOSIS — E78.5 HYPERLIPIDEMIA, UNSPECIFIED HYPERLIPIDEMIA TYPE: ICD-10-CM

## 2023-06-30 DIAGNOSIS — S99.921A INJURY OF TOE ON RIGHT FOOT, INITIAL ENCOUNTER: ICD-10-CM

## 2023-06-30 DIAGNOSIS — I10 BENIGN ESSENTIAL HYPERTENSION: Primary | ICD-10-CM

## 2023-06-30 DIAGNOSIS — N40.1 BENIGN PROSTATIC HYPERPLASIA WITH URINARY FREQUENCY: ICD-10-CM

## 2023-06-30 DIAGNOSIS — R35.0 BENIGN PROSTATIC HYPERPLASIA WITH URINARY FREQUENCY: ICD-10-CM

## 2023-06-30 DIAGNOSIS — F20.9 SCHIZOPHRENIA, UNSPECIFIED TYPE (HCC): ICD-10-CM

## 2023-06-30 DIAGNOSIS — R91.1 LUNG NODULE: ICD-10-CM

## 2023-06-30 DIAGNOSIS — J45.40 MODERATE PERSISTENT ASTHMA WITHOUT COMPLICATION: ICD-10-CM

## 2023-06-30 PROCEDURE — 73630 X-RAY EXAM OF FOOT: CPT

## 2023-06-30 RX ORDER — ATORVASTATIN CALCIUM 20 MG/1
20 TABLET, FILM COATED ORAL DAILY
Qty: 90 TABLET | Refills: 1 | Status: SHIPPED | OUTPATIENT
Start: 2023-06-30

## 2023-06-30 RX ORDER — TAMSULOSIN HYDROCHLORIDE 0.4 MG/1
0.4 CAPSULE ORAL
Qty: 90 CAPSULE | Refills: 0 | Status: SHIPPED | OUTPATIENT
Start: 2023-06-30

## 2023-06-30 RX ORDER — ACETAMINOPHEN 500 MG
1000 TABLET ORAL EVERY 8 HOURS PRN
Qty: 30 TABLET | Refills: 0 | Status: SHIPPED | OUTPATIENT
Start: 2023-06-30

## 2023-06-30 RX ORDER — FINASTERIDE 5 MG/1
5 TABLET, FILM COATED ORAL DAILY
Qty: 30 TABLET | Refills: 5 | Status: SHIPPED | OUTPATIENT
Start: 2023-06-30

## 2023-06-30 NOTE — ASSESSMENT & PLAN NOTE
- On Flomax 0 4mg daily, which has been using for 7-8 months  - He reports increased urinary frequency, and did not notice much change after starting the medication  - Will restart patient on Flomax 0 4 mg daily and will add Finasteride 5 mg daily to his regimen  Will follow up    - Will order PSA for screening of prostate cancer

## 2023-06-30 NOTE — PROGRESS NOTES
Name: Jonathan       : 1955      MRN: [id number]  Encounter Provider: Nick Michael MD  Encounter Date: 2023   Encounter department: [address] [place] [address]     1  Benign essential hypertension  Assessment & Plan:  - BP today: 130/80  - Patient on Lisinopril 40 mg daily  - Educated on low-salt diet, smoking avoidance and routine of exercises  - Continue current regimen    Orders:  -     Basic metabolic panel; Future    2  Chronic kidney disease (CKD), stage II (mild)  Assessment & Plan:  - As of January, patient's creatinine within normal limits and GFR of 80  Condition overall stable  - Continue management of hypertension  - Avoid NSAIDs and other nephrotoxic agents, encourage good PO hydration  - Repeat BMP prior to next visit for surveillance    Orders:  -     Basic metabolic panel; Future    3  Moderate persistent asthma without complication  Assessment & Plan:  - Currently on Symbicort 2 puffs daily and Albuterol PRN  - Last use of Albuterol: 1-2 weeks ago  - Well-controlled, denies SOB  - Does not smoke cigarettes  - Continue current management            4  Mixed hyperlipidemia  Assessment & Plan:  - Patient on Lipitor 20 mg daily, however has not been taking  - LDL not at goal as of 2022, with ASCVD risk of 18 5%  - Recommend patient to repeat lipid panel prior to next visit  - Counseled on lifestyle changes, such as routine of exercises and healthier diet  - Will refill medication to patient    Orders:  -     Lipid Panel with Direct LDL reflex; Future    5  Iron deficiency anemia, unspecified iron deficiency anemia type  Assessment & Plan:  - Anemia resolved, based on most recent CBCs  - Patient with history of colorectal carcinoma  - Most recent colonoscopy done in  shows serrated adenoma  - Continue surveillance with repeat colonoscopy in          6   Lung nodule  Assessment & Plan:  - Patient had a repeat CT chest, which showed stable lung nodule in comparison to prior study, most likely benign  7  Sch[facility]ia, unspecified type ([facility]  Assessment & Plan:  - Continue medications as prescribed by Psychiatry  - Continue follow-up with specialist every 2 months      8  Benign prostatic hyperplasia with urinary frequency  Assessment & Plan:  - On Flomax 0 4mg daily, which has been using for 7-8 months  - He reports increased urinary frequency, and did not notice much change after starting the medication  - Will restart patient on Flomax 0 4 mg daily and will add Finasteride 5 mg daily to his regimen  Will follow up  - Will order PSA for screening of prostate cancer    Orders:  -     tamsulosin (FLOMAX) 0 4 mg; Take 1 capsule (0 4 mg total) by mouth daily with dinner  -     finasteride (PROSCAR) 5 mg tablet; Take 1 tablet (5 mg total) by mouth daily  -     PSA, Total Screen; Future    9  Hyperlipidemia, unspecified hyperlipidemia type  -     atorvastatin (LIPITOR) 20 mg tablet; Take 1 tablet (20 mg total) by mouth daily    10  Injury of toe on right foot, initial encounter  Assessment & Plan:  - Trauma to 4th right toe, patient hit foot to a table  - Pain, swelling and erythema since then and noted on exam (see media)  - Will order X-ray to rule out fracture  Advised patient to do imaging today  Will follow up on result  - For pain, recommend Tylenol 1,000 mg q8h and will avoid NSAIDs at this moment in view of CKD stage 2  Orders:  -     XR foot 3+ vw right; Future; Expected date: 06/30/2023  -     acetaminophen (TYLENOL) 500 mg tablet; Take 2 tablets (1,000 mg total) by mouth every 8 (eight) hours as needed for mild pain         Subjective      This is a very pleasant 79 y o  male who presents to the clinic for management of their chronic medical conditions  Patient's medical conditions are stable unless noted otherwise above    Patient has not had any recent hospitalizations, or medical emergencies since last visit  Patient has no further complaints other than what is mentioned in the ROS  Review of Systems   Constitutional: Negative for chills and fever  HENT: Negative for ear pain and sore throat  Eyes: Negative for pain and visual disturbance  Respiratory: Negative for cough and shortness of breath  Cardiovascular: Negative for chest pain and palpitations  Gastrointestinal: Negative for abdominal pain and vomiting  Genitourinary: Negative for dysuria and hematuria  Musculoskeletal: Negative for arthralgias and back pain  Toe pain   Skin: Negative for color change and rash  Neurological: Negative for seizures and syncope  All other systems reviewed and are negative  Current Outpatient Medications on File Prior to Visit   Medication Sig   • albuterol (PROVENTIL HFA,VENTOLIN HFA) 90 mcg/act inhaler Inhale 2 puffs every 6 (six) hours as needed for wheezing   • ARIPiprazole (ABILIFY) 20 MG tablet TK 1 T PO QD  • Artificial Tears 1 4 % ophthalmic solution Administer 1 drop to both eyes as needed for dry eyes   • aspirin (ECOTRIN LOW STRENGTH) 81 mg EC tablet Take 1 tablet (81 mg total) by mouth daily   • benztropine (COGENTIN) 0 5 mg tablet every 24 hours   • clonazePAM (KlonoPIN) 1 mg tablet TK 1 T PO BID PRA (Patient not taking: Reported on 6/29/2021)   • clonazePAM (KlonoPIN) 2 mg tablet TK 1 T PO HS PRN     • cyanocobalamin (VITAMIN B-12) 1,000 mcg tablet Take 1 tablet (1,000 mcg total) by mouth daily (Patient not taking: Reported on 6/29/2021)   • diphenhydrAMINE (BENADRYL) 25 mg tablet Take 1 tablet (25 mg total) by mouth every 6 (six) hours as needed for itching for up to 5 days   • lidocaine-prilocaine (EMLA) cream  (Patient not taking: Reported on 6/29/2021)   • lisinopril (ZESTRIL) 40 mg tablet TAKE 1 TABLET(40 MG) BY MOUTH DAILY   • loratadine (CLARITIN) 10 mg tablet Take 1 tablet (10 mg total) by mouth daily   • methocarbamol (ROBAXIN) 500 mg tablet Take 1 tablet (500 mg "total) by mouth 2 (two) times a day as needed for muscle spasms   • mirtazapine (REMERON) 30 mg tablet TK 1 T PO HS   • Multiple Vitamin (multivitamin) tablet Take 1 tablet by mouth daily   • omeprazole (PriLOSEC) 20 mg delayed release capsule Take 1 capsule (20 mg total) by mouth daily (Patient not taking: Reported on 6/29/2021)   • Symbicort 80-4 5 MCG/ACT inhaler Inhale 2 puffs 2 (two) times a day Rinse mouth after use   • tetrahydrozoline-zinc (VISINE-AC) 0 05-0 25 % ophthalmic solution Administer 2 drops to both eyes 3 (three) times a day as needed (As needed) (Patient not taking: Reported on 6/29/2021)   • venlafaxine (EFFEXOR-XR) 150 mg 24 hr capsule TK ONE C PO D   • venlafaxine (EFFEXOR-XR) 75 mg 24 hr capsule TK ONE C PO QD WF AND WITH VENLAFAXINE 150 MG   • zolpidem (AMBIEN) 10 mg tablet TK 1 T PO HS PRF SLEEP   • [DISCONTINUED] acetaminophen (TYLENOL) 500 mg tablet Take 1 tablet (500 mg total) by mouth every 6 (six) hours as needed for mild pain or moderate pain (Patient not taking: Reported on 6/29/2021)   • [DISCONTINUED] atorvastatin (LIPITOR) 20 mg tablet TAKE 1 TABLET(20 MG) BY MOUTH DAILY   • [DISCONTINUED] tamsulosin (FLOMAX) 0 4 mg Take 1 capsule (0 4 mg total) by mouth daily with dinner       Objective     /80 (BP Location: Right arm, Patient Position: Sitting, Cuff Size: Standard)   Pulse 78   Temp (!) 96 6 °F (35 9 °C) (Temporal)   Resp 16   Ht 5' 7\" (1 702 m)   Wt 80 3 kg (177 lb)   SpO2 99%   BMI 27 72 kg/m²     Physical Exam  Constitutional:       General: He is not in acute distress  Appearance: Normal appearance  He is not ill-appearing, toxic-appearing or diaphoretic  HENT:      Head: Normocephalic and atraumatic  Nose: Nose normal       Mouth/Throat:      Mouth: Mucous membranes are moist    Eyes:      Conjunctiva/sclera: Conjunctivae normal    Cardiovascular:      Rate and Rhythm: Normal rate and regular rhythm  Pulses: Normal pulses        Heart sounds: " Normal heart sounds  No murmur heard  Pulmonary:      Effort: Pulmonary effort is normal  No respiratory distress  Breath sounds: Normal breath sounds  No wheezing  Musculoskeletal:         General: Tenderness present  Normal range of motion  Right foot: Swelling, deformity and tenderness present  Left foot: Normal       Comments: 4th toe   Skin:     General: Skin is warm  Neurological:      General: No focal deficit present  Mental Status: He is alert and oriented to person, place, and time  Psychiatric:         Mood and Affect: Mood normal          Behavior: Behavior normal          Thought Content:  Thought content normal          Judgment: Judgment normal        Alannah Carr MD

## 2023-06-30 NOTE — ASSESSMENT & PLAN NOTE
- Trauma to 4th right toe, patient hit foot to a table  - Pain, swelling and erythema since then and noted on exam (see media)  - Will order X-ray to rule out fracture  Advised patient to do imaging today  Will follow up on result  - For pain, recommend Tylenol 1,000 mg q8h and will avoid NSAIDs at this moment in view of CKD stage 2

## 2023-11-27 ENCOUNTER — OFFICE VISIT (OUTPATIENT)
Dept: FAMILY MEDICINE CLINIC | Facility: CLINIC | Age: 68
End: 2023-11-27

## 2023-11-27 VITALS
OXYGEN SATURATION: 98 % | RESPIRATION RATE: 16 BRPM | SYSTOLIC BLOOD PRESSURE: 132 MMHG | DIASTOLIC BLOOD PRESSURE: 78 MMHG | HEART RATE: 73 BPM | HEIGHT: 67 IN | WEIGHT: 176 LBS | BODY MASS INDEX: 27.62 KG/M2 | TEMPERATURE: 98.1 F

## 2023-11-27 DIAGNOSIS — H10.813 PINGUECULITIS OF BOTH EYES: Primary | ICD-10-CM

## 2023-11-27 PROCEDURE — 99213 OFFICE O/P EST LOW 20 MIN: CPT | Performed by: STUDENT IN AN ORGANIZED HEALTH CARE EDUCATION/TRAINING PROGRAM

## 2023-11-27 RX ORDER — TETRAHYDROZOLINE HCL 0.05 %
2 DROPS OPHTHALMIC (EYE) 4 TIMES DAILY PRN
Qty: 15 ML | Refills: 2 | Status: SHIPPED | OUTPATIENT
Start: 2023-11-27

## 2023-11-27 NOTE — PROGRESS NOTES
Name: Shahana Quinones      : 1955      MRN: [id number]  Encounter Provider: Cisco Choe MD  Encounter Date: 2023   Encounter department: [address]     1. Pingueculitis of both eyes  Assessment & Plan:  Assessment   Patient stable from ophthalmologic standpoint with no observable active infection. Vision grossly intact during PE. Eyes does appear mildly dry  with bilateral pingueculitis. Plan   Ambulatory referral to ophthalmology   Eye drops ordered. ED precautions for new onset/worsening symptoms. Follow up with PCP in 1 week or earlier w same day visit as needed. Orders:  -     Ambulatory Referral to Ophthalmology; Future  -     tetrahydrozoline (Eye Drops) 0.05 % ophthalmic solution; Administer 2 drops to both eyes 4 (four) times a day as needed for irritation           Subjective      It was a pleasure to see Shahana Quinones is a 76 y.o.  male here for same day visit c/o eye problems. Reports that feels like both of his eye are burning. 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 Hx of colon CA in remission. Eye Problem   Both eyes are affected. This is a new problem. Episode onset: 2 weeks. The problem occurs constantly. The problem has been unchanged. There was no injury mechanism. The patient is experiencing no pain. There is No known exposure to pink eye. He Does not wear contacts. Associated symptoms include eye redness and itching. Pertinent negatives include no blurred vision, eye discharge, double vision, fever, foreign body sensation, nausea, photophobia, recent URI or vomiting. He has tried nothing for the symptoms. Review of Systems   Constitutional:  Negative for chills and fever. HENT:  Negative for ear pain and sore throat. Eyes:  Positive for redness and itching. Negative for blurred vision, double vision, photophobia, pain, discharge and visual disturbance.    Respiratory: Negative for cough and shortness of breath. Cardiovascular:  Negative for chest pain and palpitations. Gastrointestinal:  Negative for abdominal pain, nausea and vomiting. Genitourinary:  Negative for difficulty urinating, dysuria and hematuria. Musculoskeletal:  Negative for arthralgias and back pain. Skin:  Negative for color change and rash. Neurological:  Negative for seizures and syncope. All other systems reviewed and are negative. Current Outpatient Medications on File Prior to Visit   Medication Sig    acetaminophen (TYLENOL) 500 mg tablet Take 2 tablets (1,000 mg total) by mouth every 8 (eight) hours as needed for mild pain    albuterol (PROVENTIL HFA,VENTOLIN HFA) 90 mcg/act inhaler Inhale 2 puffs every 6 (six) hours as needed for wheezing    ARIPiprazole (ABILIFY) 20 MG tablet TK 1 T PO QD. Artificial Tears 1.4 % ophthalmic solution Administer 1 drop to both eyes as needed for dry eyes    aspirin (ECOTRIN LOW STRENGTH) 81 mg EC tablet Take 1 tablet (81 mg total) by mouth daily    atorvastatin (LIPITOR) 20 mg tablet Take 1 tablet (20 mg total) by mouth daily    benztropine (COGENTIN) 0.5 mg tablet every 24 hours    clonazePAM (KlonoPIN) 1 mg tablet TK 1 T PO BID PRA (Patient not taking: Reported on 6/29/2021)    clonazePAM (KlonoPIN) 2 mg tablet TK 1 T PO HS PRN.     cyanocobalamin (VITAMIN B-12) 1,000 mcg tablet Take 1 tablet (1,000 mcg total) by mouth daily (Patient not taking: Reported on 6/29/2021)    diphenhydrAMINE (BENADRYL) 25 mg tablet Take 1 tablet (25 mg total) by mouth every 6 (six) hours as needed for itching for up to 5 days    finasteride (PROSCAR) 5 mg tablet Take 1 tablet (5 mg total) by mouth daily    lidocaine-prilocaine (EMLA) cream  (Patient not taking: Reported on 6/29/2021)    lisinopril (ZESTRIL) 40 mg tablet TAKE 1 TABLET(40 MG) BY MOUTH DAILY    loratadine (CLARITIN) 10 mg tablet Take 1 tablet (10 mg total) by mouth daily    methocarbamol (ROBAXIN) 500 mg tablet Take 1 tablet (500 mg total) by mouth 2 (two) times a day as needed for muscle spasms    mirtazapine (REMERON) 30 mg tablet TK 1 T PO HS    Multiple Vitamin (multivitamin) tablet Take 1 tablet by mouth daily    omeprazole (PriLOSEC) 20 mg delayed release capsule Take 1 capsule (20 mg total) by mouth daily (Patient not taking: Reported on 6/29/2021)    Symbicort 80-4.5 MCG/ACT inhaler Inhale 2 puffs 2 (two) times a day Rinse mouth after use    tamsulosin (FLOMAX) 0.4 mg Take 1 capsule (0.4 mg total) by mouth daily with dinner    venlafaxine (EFFEXOR-XR) 150 mg 24 hr capsule TK ONE C PO D    venlafaxine (EFFEXOR-XR) 75 mg 24 hr capsule TK ONE C PO QD WF AND WITH VENLAFAXINE 150 MG    zolpidem (AMBIEN) 10 mg tablet TK 1 T PO HS PRF SLEEP       Objective     /78 (BP Location: Right arm, Patient Position: Sitting, Cuff Size: Standard)   Pulse 73   Temp 98.1 °F (36.7 °C) (Temporal)   Resp 16   Ht 5' 7" (1.702 m)   Wt 79.8 kg (176 lb)   SpO2 98%   BMI 27.57 kg/m²     Physical Exam  Vitals and nursing note reviewed. Constitutional:       General: He is not in acute distress. Appearance: He is well-developed. He is not ill-appearing, toxic-appearing or diaphoretic. HENT:      Head: Normocephalic and atraumatic. Eyes:      General: Lids are normal. Lids are everted, no foreign bodies appreciated. Vision grossly intact. Gaze aligned appropriately. No allergic shiner, visual field deficit or scleral icterus. Right eye: No foreign body, discharge or hordeolum. Left eye: No foreign body, discharge or hordeolum. Extraocular Movements: Extraocular movements intact. Right eye: Normal extraocular motion and no nystagmus. Left eye: Normal extraocular motion and no nystagmus. Conjunctiva/sclera:      Right eye: Right conjunctiva is injected. No chemosis, exudate or hemorrhage. Left eye: Left conjunctiva is injected. No chemosis, exudate or hemorrhage.      Pupils: Pupils are equal, round, and reactive to light. Pupils are equal.     Cardiovascular:      Rate and Rhythm: Normal rate and regular rhythm. No extrasystoles are present. Pulses:           Radial pulses are 2+ on the right side and 2+ on the left side. Heart sounds: Normal heart sounds, S1 normal and S2 normal. No murmur heard. No friction rub. No gallop. Pulmonary:      Effort: Pulmonary effort is normal. No respiratory distress. Breath sounds: Normal breath sounds and air entry. Abdominal:      General: Bowel sounds are normal.      Palpations: Abdomen is soft. There is no mass. Tenderness: There is no abdominal tenderness. There is no right CVA tenderness, left CVA tenderness, guarding or rebound. Musculoskeletal:         General: No swelling, tenderness, deformity or signs of injury. Normal range of motion. Cervical back: Normal range of motion. Right lower leg: No edema. Left lower leg: No edema. Feet:      Right foot:      Skin integrity: No ulcer, skin breakdown, erythema, warmth, callus or dry skin. Left foot:      Skin integrity: No ulcer, skin breakdown, erythema, warmth, callus or dry skin. Skin:     General: Skin is warm. Findings: No rash. Neurological:      General: No focal deficit present. Mental Status: He is alert.        Marisela Kanner, MD

## 2023-12-04 PROBLEM — H10.813: Status: ACTIVE | Noted: 2023-12-04

## 2023-12-04 NOTE — ASSESSMENT & PLAN NOTE
Assessment   Patient stable from ophthalmologic standpoint with no observable active infection. Vision grossly intact during PE. Eyes does appear mildly dry  with bilateral pingueculitis. Plan   Ambulatory referral to ophthalmology   Eye drops ordered. ED precautions for new onset/worsening symptoms. Follow up with PCP in 1 week or earlier w same day visit as needed.

## 2023-12-28 DIAGNOSIS — E78.5 HYPERLIPIDEMIA, UNSPECIFIED HYPERLIPIDEMIA TYPE: ICD-10-CM

## 2023-12-28 RX ORDER — ATORVASTATIN CALCIUM 20 MG/1
20 TABLET, FILM COATED ORAL DAILY
Qty: 90 TABLET | Refills: 1 | Status: SHIPPED | OUTPATIENT
Start: 2023-12-28

## 2024-01-05 ENCOUNTER — OFFICE VISIT (OUTPATIENT)
Dept: FAMILY MEDICINE CLINIC | Facility: CLINIC | Age: 69
End: 2024-01-05

## 2024-01-05 VITALS
TEMPERATURE: 97.5 F | BODY MASS INDEX: 27.31 KG/M2 | WEIGHT: 174.4 LBS | DIASTOLIC BLOOD PRESSURE: 78 MMHG | HEART RATE: 89 BPM | SYSTOLIC BLOOD PRESSURE: 126 MMHG | OXYGEN SATURATION: 97 %

## 2024-01-05 DIAGNOSIS — J01.20 ACUTE NON-RECURRENT ETHMOIDAL SINUSITIS: Primary | ICD-10-CM

## 2024-01-05 DIAGNOSIS — R55 SYNCOPE, UNSPECIFIED SYNCOPE TYPE: ICD-10-CM

## 2024-01-05 DIAGNOSIS — C19 RECTOSIGMOID JUNCTION CARCINOMA (HCC): Chronic | ICD-10-CM

## 2024-01-05 DIAGNOSIS — I10 BENIGN ESSENTIAL HYPERTENSION: ICD-10-CM

## 2024-01-05 DIAGNOSIS — Z23 ENCOUNTER FOR IMMUNIZATION: ICD-10-CM

## 2024-01-05 RX ORDER — BLOOD PRESSURE TEST KIT
KIT MISCELLANEOUS 2 TIMES DAILY
Qty: 1 KIT | Refills: 0 | Status: SHIPPED | OUTPATIENT
Start: 2024-01-05

## 2024-01-05 RX ORDER — IBUPROFEN 600 MG/1
600 TABLET ORAL EVERY 6 HOURS PRN
Qty: 60 TABLET | Refills: 0 | Status: SHIPPED | OUTPATIENT
Start: 2024-01-05

## 2024-01-05 RX ORDER — GUAIFENESIN 600 MG/1
1200 TABLET, EXTENDED RELEASE ORAL EVERY 12 HOURS SCHEDULED
Qty: 30 TABLET | Refills: 0 | Status: SHIPPED | OUTPATIENT
Start: 2024-01-05

## 2024-01-05 RX ORDER — FLUTICASONE PROPIONATE 50 MCG
1 SPRAY, SUSPENSION (ML) NASAL DAILY
Qty: 16 G | Refills: 0 | Status: SHIPPED | OUTPATIENT
Start: 2024-01-05

## 2024-01-05 RX ORDER — LISINOPRIL 40 MG/1
40 TABLET ORAL DAILY
Qty: 90 TABLET | Refills: 1 | Status: SHIPPED | OUTPATIENT
Start: 2024-01-05

## 2024-01-05 RX ORDER — AMOXICILLIN AND CLAVULANATE POTASSIUM 875; 125 MG/1; MG/1
1 TABLET, FILM COATED ORAL EVERY 12 HOURS SCHEDULED
Qty: 10 TABLET | Refills: 0 | Status: SHIPPED | OUTPATIENT
Start: 2024-01-05 | End: 2024-01-10

## 2024-01-05 NOTE — PROGRESS NOTES
Name: Saji Blackman      : 1955      MRN: [id number]  Encounter Provider: Rebecca Fay Kab-Perlman, MD  Encounter Date: 2024   Encounter department: [facility]    Assessment & Plan     1. Acute non-recurrent ethmoidal sinusitis  Assessment & Plan:  Ongoing for one month with change in nasal discharge from clear to green. Providing with 5 day course of augmentin and symptomatic relief with flonase, nasal spray, ibuprofen, and mucinex. Counseling provided regarding humidifying nasal passages including warm shower and humidifier to combat dry winter heat.     Orders:  -     sodium chloride (OCEAN) 0.65 % nasal spray; 2 sprays into each nostril as needed for congestion  -     fluticasone (FLONASE) 50 mcg/act nasal spray; 1 spray into each nostril daily  -     ibuprofen (MOTRIN) 600 mg tablet; Take 1 tablet (600 mg total) by mouth every 6 (six) hours as needed for mild pain  -     guaiFENesin (MUCINEX) 600 mg 12 hr tablet; Take 2 tablets (1,200 mg total) by mouth every 12 (twelve) hours  -     amoxicillin-clavulanate (AUGMENTIN) 875-125 mg per tablet; Take 1 tablet by mouth every 12 (twelve) hours for 5 days    2. Syncope, unspecified syncope type  Assessment & Plan:  -Three falls thus far: one while doing a markell job got dizzy had to rest one hour (1 year ago), another awoke on the floor (6 months ago), and the most recent one when got out of bed quickly and that was 3 weeks ago.   -EKG from yesterday unremarkable. Orthostatics today negative for orthostatic hypotension.   -Moves a lot when sleeping  -no use of alcohol or drugs prior to these episodes  -no aura  -unable to recall if palpitations and diaphoresis     PLAN  For today suggested patient get out of bed slowly, sitting for two minutes prior to rising. As well recommended 64 ounces of water daily.     Orders:  -     POCT ECG    3. Benign essential hypertension  Assessment & Plan:  BP of 120/68 has not used  lisinopril 40mg in one week. Takes in the mornings. Using tamsulosin at night. In the setting of syncope. Possible lisinopril dose too high or possible it's precisely this dose that keeps the bp low. As patient has been on this regimen for some time; will refill. Provided patient with bp kit and will keep bp log. If bp is low and/or patient continues to have syncopal episodes, may consider lowering lisinopril dose.     Orders:  -     lisinopril (ZESTRIL) 40 mg tablet; Take 1 tablet (40 mg total) by mouth daily  -     Blood Pressure KIT; Use 2 (two) times a day    4. 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HCC)  Assessment & Plan:  S/P surgery and needs f/u colonoscopy. Received chemo last dose approximately one year ago. Stated that once colonoscopy is repeated would consider removal of port. Does not know when it's to be repeated. Encouraged patient call GI and schedule an appointment.       5. Encounter for immunization  -     influenza vaccine, high-dose, PF 0.7 mL (FLUZONE HIGH-DOSE)        Depression Screening and Follow-up Plan: Patient's depression screening was positive with a PHQ-2 score of 3. Their PHQ-9 score was 5. Continue regular follow-up with their mental health provider who is managing their mental health condition(s).         Addison Blackman is a 67 yo male patient PMH moderate persistent asthma, CKD stage 2, history of rectosigmoid junction carcinoma, schizophrenia, presenting today to f/u regarding an ED visit with [facility] on th street visit on 1/3/23 for a cough and congestion for 3 days. EKG at that time unremarkable with normal sinus rhythm. Flu/rsv/covid negative, no leukocytosis, UA unremarkable, and CT chest, abdomen, pelvis  Patient was given tylenol, lidocaine patch, and 500 mL bolus NS with improvement.     Today patient states he has had symptoms for one month now, has headaches worsened by his cough, sinus pressure and brownish mucous production; no blood. There has been a change  in nasal discharge from yellow to green, night time sweats and chills with subjective fever. Has tried OTC cough medications but has not helped.    Additional concerns including a fall that occurred 3 weeks ago where he hit the back of his head and left upper arm while getting out of bed. Then he got back up and went to the bathroom and fell again stating he hit his head again. No incontinence. He states he had loss of consciousness for about one minute each time. He never followed with a physician regarding these falls. He states he  mentioned these falls in the ED two days ago.he has fallen twice before, a year ago and 6 months ago. In one instance he was helping a friend with markell, got dizzy and had to sit down for an hour. No drinking or drugs the night before. Can't remember if he had palpitations. No diaphoresis. The second instance he awoke on the floor by his bed. He states he moves a lot in his sleep. For this most recent episode he reports he got up fast from bed. He drinks about 3 12 ounce sized bottles of water daily. Drinks a cup of coffee in the morning. Drinks orange and lemon juice usually 2 cups.     We attempted to review his medications but the patient is not sure what he takes. He states he takes something for blood pressure and something prescribed by a psychiatrist. Further probing revealed lisinopril 40mg daily however he has not used it in a week. He takes it in the morning with breakfast. He also uses tamsulosin at night but doesn't feel like it's helping. He states he takes abilify and venlafaxine however he would benefit from from medication review as he seems unclear. Regarding his asthma he sometimes uses his inhaler but states he ran out. He is unsure which one he uses.     History of colon cancer, unable to recall surgery where had two months of inpatient hospital stay. Stopped chemotherapy one year ago. He has a port in his chest that hasn't been removed. Waiting on a repeat  colonoscopy and once that's done specialist will consider if appropriate to take port to. Colonoscopy will be sometime this year can't recall when. He usually gets a call to schedule it.     Has therapist by the name of Lee.     Review of Systems   Constitutional:  Positive for chills and fatigue.   HENT:  Positive for congestion, postnasal drip, sinus pressure and sinus pain. Negative for sore throat.    Eyes:  Negative for pain, discharge, redness and visual disturbance.   Respiratory:  Positive for cough. Negative for shortness of breath, wheezing and stridor.    Cardiovascular:  Negative for chest pain, palpitations and leg swelling.   Gastrointestinal:  Negative for abdominal pain, nausea and vomiting.   Skin:  Negative for rash.       Current Outpatient Medications on File Prior to Visit   Medication Sig    acetaminophen (TYLENOL) 500 mg tablet Take 2 tablets (1,000 mg total) by mouth every 8 (eight) hours as needed for mild pain    albuterol (PROVENTIL HFA,VENTOLIN HFA) 90 mcg/act inhaler Inhale 2 puffs every 6 (six) hours as needed for wheezing    ARIPiprazole (ABILIFY) 20 MG tablet TK 1 T PO QD.    Artificial Tears 1.4 % ophthalmic solution Administer 1 drop to both eyes as needed for dry eyes    aspirin (ECOTRIN LOW STRENGTH) 81 mg EC tablet Take 1 tablet (81 mg total) by mouth daily    atorvastatin (LIPITOR) 20 mg tablet Take 1 tablet (20 mg total) by mouth daily    benztropine (COGENTIN) 0.5 mg tablet every 24 hours    clonazePAM (KlonoPIN) 2 mg tablet TK 1 T PO HS PRN.    finasteride (PROSCAR) 5 mg tablet Take 1 tablet (5 mg total) by mouth daily    loratadine (CLARITIN) 10 mg tablet Take 1 tablet (10 mg total) by mouth daily    methocarbamol (ROBAXIN) 500 mg tablet Take 1 tablet (500 mg total) by mouth 2 (two) times a day as needed for muscle spasms    mirtazapine (REMERON) 30 mg tablet TK 1 T PO HS    Multiple Vitamin (multivitamin) tablet Take 1 tablet by mouth daily    Symbicort 80-4.5 MCG/ACT  inhaler Inhale 2 puffs 2 (two) times a day Rinse mouth after use    tamsulosin (FLOMAX) 0.4 mg Take 1 capsule (0.4 mg total) by mouth daily with dinner    tetrahydrozoline (Eye Drops) 0.05 % ophthalmic solution Administer 2 drops to both eyes 4 (four) times a day as needed for irritation    venlafaxine (EFFEXOR-XR) 150 mg 24 hr capsule TK ONE C PO D    venlafaxine (EFFEXOR-XR) 75 mg 24 hr capsule TK ONE C PO QD WF AND WITH VENLAFAXINE 150 MG    zolpidem (AMBIEN) 10 mg tablet TK 1 T PO HS PRF SLEEP    [DISCONTINUED] lisinopril (ZESTRIL) 40 mg tablet TAKE 1 TABLET(40 MG) BY MOUTH DAILY    clonazePAM (KlonoPIN) 1 mg tablet TK 1 T PO BID PRA (Patient not taking: No sig reported)    cyanocobalamin (VITAMIN B-12) 1,000 mcg tablet Take 1 tablet (1,000 mcg total) by mouth daily (Patient not taking: Reported on 6/29/2021)    diphenhydrAMINE (BENADRYL) 25 mg tablet Take 1 tablet (25 mg total) by mouth every 6 (six) hours as needed for itching for up to 5 days    lidocaine-prilocaine (EMLA) cream  (Patient not taking: Reported on 6/29/2021)    omeprazole (PriLOSEC) 20 mg delayed release capsule Take 1 capsule (20 mg total) by mouth daily (Patient not taking: Reported on 6/29/2021)       Objective     /78 (BP Location: Left arm, Patient Position: Sitting, Cuff Size: Standard)   Pulse 89   Temp 97.5 °F (36.4 °C) (Temporal)   Wt 79.1 kg (174 lb 6.4 oz)   SpO2 97%   BMI 27.31 kg/m²     Physical Exam  Constitutional:       Appearance: Normal appearance. He is normal weight.   HENT:      Head: Normocephalic and atraumatic.      Comments: Tenderness with palpation of ethmoid sinuses     Right Ear: Tympanic membrane, ear canal and external ear normal.      Left Ear: Tympanic membrane, ear canal and external ear normal.      Nose: Congestion present.      Mouth/Throat:      Mouth: Mucous membranes are moist.      Pharynx: Oropharynx is clear. Posterior oropharyngeal erythema present. No oropharyngeal exudate.      Comments:  Erythematous pharynx   Eyes:      General: No scleral icterus.        Right eye: No discharge.         Left eye: No discharge.      Extraocular Movements: Extraocular movements intact.      Conjunctiva/sclera: Conjunctivae normal.   Cardiovascular:      Rate and Rhythm: Normal rate and regular rhythm.      Pulses: Normal pulses.      Heart sounds: Normal heart sounds. No murmur heard.     No friction rub. No gallop.   Pulmonary:      Effort: Pulmonary effort is normal.      Breath sounds: No wheezing, rhonchi or rales.      Comments: Decreased breath sounds in all lung fields  Abdominal:      General: Abdomen is flat. Bowel sounds are normal. There is no distension.      Palpations: Abdomen is soft.      Tenderness: There is no abdominal tenderness.   Musculoskeletal:         General: Normal range of motion.      Cervical back: Normal range of motion.      Comments: Upper and lower extremities 5+   Skin:     General: Skin is warm.      Capillary Refill: Capillary refill takes less than 2 seconds.      Findings: No rash.   Neurological:      General: No focal deficit present.      Mental Status: He is alert and oriented to person, place, and time.      Cranial Nerves: No cranial nerve deficit (CN2-12 intact).      Sensory: No sensory deficit.      Motor: No weakness.      Coordination: Coordination normal.   Psychiatric:         Mood and Affect: Mood normal.         Behavior: Behavior normal.         Thought Content: Thought content normal.         Judgment: Judgment normal.       Rebecca Fay Kab-Perlman, MD

## 2024-01-05 NOTE — ASSESSMENT & PLAN NOTE
-Three falls thus far: one while doing a markell job got dizzy had to rest one hour (1 year ago), another awoke on the floor (6 months ago), and the most recent one when got out of bed quickly and that was 3 weeks ago.   -EKG from yesterday unremarkable. Orthostatics today negative for orthostatic hypotension.   -Moves a lot when sleeping  -no use of alcohol or drugs prior to these episodes  -no aura  -unable to recall if palpitations and diaphoresis     PLAN  For today suggested patient get out of bed slowly, sitting for two minutes prior to rising. As well recommended 64 ounces of water daily.

## 2024-01-05 NOTE — ASSESSMENT & PLAN NOTE
Ongoing for one month with change in nasal discharge from clear to green. Providing with 5 day course of augmentin and symptomatic relief with flonase, nasal spray, ibuprofen, and mucinex. Counseling provided regarding humidifying nasal passages including warm shower and humidifier to combat dry winter heat.

## 2024-01-05 NOTE — ASSESSMENT & PLAN NOTE
BP of 120/68 has not used lisinopril 40mg in one week. Takes in the mornings. Using tamsulosin at night. In the setting of syncope. Possible lisinopril dose too high or possible it's precisely this dose that keeps the bp low. As patient has been on this regimen for some time; will refill. Provided patient with bp kit and will keep bp log. If bp is low and/or patient continues to have syncopal episodes, may consider lowering lisinopril dose.

## 2024-01-05 NOTE — ASSESSMENT & PLAN NOTE
S/P surgery and needs f/u colonoscopy. Received chemo last dose approximately one year ago. Stated that once colonoscopy is repeated would consider removal of port. Does not know when it's to be repeated. Encouraged patient call GI and schedule an appointment.

## 2024-01-05 NOTE — PATIENT INSTRUCTIONS
Outpatient Mental Health Resources     [place] Suicide Prevention Lifeline: Dial #632  If you prefer to text, you can reach the Crisis Text Line by texting “[place]” to 364668    ¿Te encuentras en crisis? Envía un mensaje de texto con la palabra AYUDA al [phone] para comunicarte de manera gratuita con un Consejero de Crisis  Apoyo gratuito las 24 horas del día, los 7 días de la semana, al alcance de tu mano.    [facility] CRISIS for mental health emergencies and/or please go to your local Emergency Department Call [phone] if you or a loved one are in a mental health crisis.  You can Visit https://www.dhs.[place].gov/Services/Mental-Health-In-[place]/Documents/Suicide_Prevention_Hotlines.pdf to find 24/7 crisis phone line for other [facility].    [facility]   ? Location 1: [address] [place] [address] [phone]   ? Location 2: [address] [address] [phone]        ? English only* Serves ages 4+          ? Accepts Medicare and some commercial plans    [facility]   ? [address] [place] [address] [phone]; [phone]  ? Bilingual English/[place]er Serves ages 5+   ? Accepts Medical Assistance     [facility]   ? [address] [place] [address] [phone]   ? Bilingual English/[place]er Serves ages 14+   ? Accepts Medical Assistance, (Not currently accepting Medicare), & Major Commercial Insurances     [facility]   ? [address] [place] [address] [phone]        ? Bilingual English/[place] Serves ages 6+   ? Accepts Medical Assistance & Commercial Insurance     [facility]   ? Previous [facility] location is closed  ? [address], [address] [phone]   ? Bilingual English/[place] Serves ages 3+   ? Accepts Medical Assistance & Some Commercial Insurance     [facility]   ? [address] [place] [address] [phone]   ? Bilingual English/[place] Serves ages 5+   ? Accepts Medical Assistance     [facility]   ? [address] [place] [address] [place] [address] [phone]  ? Bilingual English/[place]ian Serves ages 3+   ? Accepts Medical Assistance & Some Commercial Insurance     [facility]   ? Location 1: [address] [place] [address] [phone]        ? Location 2: [address] [place] [address]   ? Bilingual English/[place]ian Serves ages 18+  ? Accepts Medical Assistance    [facility], [facility]  ? [address], [place] [address] [phone]  ? Bilingual English/[place]ian  ? Accepts Aetna, Cigna, Optum/[place] cashcloud [facility], [facility], [employer], Medicare, [employer]/[facility], [facility]. No Medical Assistance    [facility] ([phone])  Medicare/Medicare Advantage, Medical Assistance/HealthChoices, Commercial, and self-pay as payment.    TEEN HELP LINE  ? 3-792-582-TALK    CRIME VICTIMS [place]         ? [phone]       ? 24/7 Advocate Hotline    [facility]         ? [phone]       ? 24/7 Advocate Hotline    www.[employer] is a resource to find psychotherapy providers, patients can filter therapist search list based on a number of criteria including language, specialty, gender, insurance, etc. Individuals seeking will need to reach out to perspective providers through information in the directory. You are encouraged to contact multiple providers to given that many providers have a significant wait list for services as well as to find a provider is a good fit for you!    [facility] is an organization of families, friends and individuals whose lives have been affected by mental illness. Together, we advocate for better lives for those individuals who have a m)ental illness. Visit: [facility].org to learn more or to search for local support resources including qualified mental health providers.

## 2024-01-11 ENCOUNTER — TELEPHONE (OUTPATIENT)
Dept: FAMILY MEDICINE CLINIC | Facility: CLINIC | Age: 69
End: 2024-01-11

## 2024-01-11 NOTE — TELEPHONE ENCOUNTER
01/11/24    PCP SIGNATURE NEEDED FOR   [facility] / Prescriber Response     FORM RECEIVED VIA FAX AND PLACED IN PCP FOLDER TO BE DELIVERED AT ASSIGNED TIMES.      Med: BENZTROPINE MESYLATE & FINASTERIDE

## 2024-01-14 PROBLEM — H10.813: Status: RESOLVED | Noted: 2023-12-04 | Resolved: 2024-01-14

## 2024-02-18 PROBLEM — J01.20 ACUTE NON-RECURRENT ETHMOIDAL SINUSITIS: Status: RESOLVED | Noted: 2024-01-05 | Resolved: 2024-02-18

## 2024-02-18 PROBLEM — S99.921A INJURY OF TOE ON RIGHT FOOT: Status: RESOLVED | Noted: 2023-06-30 | Resolved: 2024-02-18

## 2024-02-18 PROBLEM — R35.89 POLYURIA: Status: RESOLVED | Noted: 2021-09-29 | Resolved: 2024-02-18

## 2024-03-11 ENCOUNTER — TELEPHONE (OUTPATIENT)
Dept: FAMILY MEDICINE CLINIC | Facility: CLINIC | Age: 69
End: 2024-03-11

## 2024-03-11 ENCOUNTER — OFFICE VISIT (OUTPATIENT)
Dept: FAMILY MEDICINE CLINIC | Facility: CLINIC | Age: 69
End: 2024-03-11

## 2024-03-11 VITALS
OXYGEN SATURATION: 98 % | WEIGHT: 173 LBS | RESPIRATION RATE: 16 BRPM | HEIGHT: 67 IN | HEART RATE: 87 BPM | BODY MASS INDEX: 27.15 KG/M2 | SYSTOLIC BLOOD PRESSURE: 142 MMHG | TEMPERATURE: 97.5 F | DIASTOLIC BLOOD PRESSURE: 80 MMHG

## 2024-03-11 DIAGNOSIS — E78.2 MIXED HYPERLIPIDEMIA: ICD-10-CM

## 2024-03-11 DIAGNOSIS — I10 BENIGN ESSENTIAL HYPERTENSION: ICD-10-CM

## 2024-03-11 DIAGNOSIS — N18.2 CHRONIC KIDNEY DISEASE (CKD), STAGE II (MILD): ICD-10-CM

## 2024-03-11 DIAGNOSIS — Z23 ENCOUNTER FOR IMMUNIZATION: ICD-10-CM

## 2024-03-11 DIAGNOSIS — C19 RECTOSIGMOID JUNCTION CARCINOMA (HCC): Chronic | ICD-10-CM

## 2024-03-11 DIAGNOSIS — N40.1 BENIGN PROSTATIC HYPERPLASIA WITH URINARY FREQUENCY: ICD-10-CM

## 2024-03-11 DIAGNOSIS — R55 SYNCOPE, UNSPECIFIED SYNCOPE TYPE: Primary | ICD-10-CM

## 2024-03-11 DIAGNOSIS — R35.0 BENIGN PROSTATIC HYPERPLASIA WITH URINARY FREQUENCY: ICD-10-CM

## 2024-03-11 PROCEDURE — 99213 OFFICE O/P EST LOW 20 MIN: CPT | Performed by: FAMILY MEDICINE

## 2024-03-11 PROCEDURE — 90750 HZV VACC RECOMBINANT IM: CPT

## 2024-03-11 PROCEDURE — 90471 IMMUNIZATION ADMIN: CPT

## 2024-03-11 RX ORDER — FINASTERIDE 5 MG/1
5 TABLET, FILM COATED ORAL DAILY
Qty: 30 TABLET | Refills: 5 | Status: SHIPPED | OUTPATIENT
Start: 2024-03-11

## 2024-03-11 NOTE — ASSESSMENT & PLAN NOTE
- Patient lost in follow-up with heme-onc (previously seeing Dr. Ford)  - As per chart, he was last seen in 2020 and was recommended to follow-up in 6 months  - Will refer patient back to heme-onc to reestablish care

## 2024-03-11 NOTE — ASSESSMENT & PLAN NOTE
- On Lipitor 20 mg daily  - Intermediate ASCVD risk  - Patient still due for a repeat lipid panel; advised him to have it done  - Continue medication and engage in lifestyle changes

## 2024-03-11 NOTE — ASSESSMENT & PLAN NOTE
- BP today: 142/80 & 150/90  - Patient took medication 5-10 minutes ago  - On Lisinopril 40 mg daily, however he is poorly compliant  - Continue medication. Addressed the importance of medication compliance with the patient.  - Return in 1 month

## 2024-03-11 NOTE — ASSESSMENT & PLAN NOTE
- Hx of multiple falls in the past associated with dizziness and one episode of syncope 6 months ago  - No evidence of orthostatic hypotension or EKG abnormalities recently  - differentials: vaso-vagal, heart valve abnormality, arrhythmia, poor blood flow to brain  - Will order echocardiogram to rule out valve disease and carotid Doppler to rule out carotid stenosis

## 2024-03-11 NOTE — PROGRESS NOTES
Name: Saji Blackman      : 1955      MRN: [id number]  Encounter Provider: Josephine Rooney MD  Encounter Date: 3/11/2024   Encounter department: [facility]    Assessment & Plan     1. Syncope, unspecified syncope type  Assessment & Plan:  - Hx of multiple falls in the past associated with dizziness and one episode of syncope 6 months ago  - No evidence of orthostatic hypotension or EKG abnormalities recently  - differentials: vaso-vagal, heart valve abnormality, arrhythmia, poor blood flow to brain  - Will order echocardiogram to rule out valve disease and carotid Doppler to rule out carotid stenosis       Orders:  -     Echo complete w/ contrast if indicated; Future; Expected date: 2024  -     VAS carotid complete study; Future; Expected date: 2024    2. Encounter for immunization  -     Zoster Vaccine Recombinant IM    3. Mixed hyperlipidemia  Assessment & Plan:  - On Lipitor 20 mg daily  - Intermediate ASCVD risk  - Patient still due for a repeat lipid panel; advised him to have it done  - Continue medication and engage in lifestyle changes      4. Benign essential hypertension  Assessment & Plan:  - BP today: 142/80 & 150/90  - Patient took medication 5-10 minutes ago  - On Lisinopril 40 mg daily, however he is poorly compliant  - Continue medication. Addressed the importance of medication compliance with the patient.  - Return in 1 month      5. Chronic kidney disease (CKD), stage II (mild)  Assessment & Plan:  Lab Results   Component Value Date    EGFR 78 2024    EGFR 80 2023    EGFR 69 2022    CREATININE 1.04 2024    CREATININE 1.03 2023    CREATININE 1.10 2022     -GFR overall stable  -Continue good blood pressure and cholesterol control  - Recheck BMP in 3 months      6. Benign prostatic hyperplasia with urinary frequency  Assessment & Plan:  - On Flomax 0.4 mg daily and continues to have increased  urinary frequency  - Reports not being on Finasteride, however prescribed in the past  - Will restart finasteride; continue Flomax  - Patient due for PSA screening  - Return in 1 month    Orders:  -     finasteride (PROSCAR) 5 mg tablet; Take 1 tablet (5 mg total) by mouth daily    7. 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HCC)  Assessment & Plan:  - Patient lost in follow-up with heme-onc (previously seeing Dr. Ford)  - As per chart, he was last seen in 2020 and was recommended to follow-up in 6 months  - Will refer patient back to heme-onc to reestablish care    Orders:  -     Ambulatory Referral to Hematology / Oncology; Future           Subjective      Saji Blackman is a 68-year-old male who presents today for a follow-up visit. He was last seen in January 2024 for an ED follow-up visit due to URI. It was noted that patient had 2 falls in the past year with LOC as per patient. EKG done in the ED was unremarkable. Orthostatics done in the office were normal. Patient says that both episodes occurred when he stood up from bed while going to the restroom. He is overall asymptomatic and is not compliant with his blood pressure medication, as he does not take it daily. He still has a port cath in place for chemotherapy and last dose was 1 year ago.       Review of Systems   Constitutional:  Negative for chills and fever.   HENT:  Negative for ear pain and sore throat.    Eyes:  Negative for pain and visual disturbance.   Respiratory:  Negative for cough and shortness of breath.    Cardiovascular:  Negative for chest pain and palpitations.   Gastrointestinal:  Negative for abdominal pain and vomiting.   Genitourinary:  Negative for dysuria and hematuria.   Musculoskeletal:  Negative for arthralgias and back pain.   Skin:  Negative for color change and rash.   Neurological:  Negative for seizures and syncope.   All other systems reviewed and are negative.      Current Outpatient Medications on File Prior to Visit    Medication Sig    acetaminophen (TYLENOL) 500 mg tablet Take 2 tablets (1,000 mg total) by mouth every 8 (eight) hours as needed for mild pain    albuterol (PROVENTIL HFA,VENTOLIN HFA) 90 mcg/act inhaler Inhale 2 puffs every 6 (six) hours as needed for wheezing    ARIPiprazole (ABILIFY) 20 MG tablet TK 1 T PO QD.    Artificial Tears 1.4 % ophthalmic solution Administer 1 drop to both eyes as needed for dry eyes    aspirin (ECOTRIN LOW STRENGTH) 81 mg EC tablet Take 1 tablet (81 mg total) by mouth daily    atorvastatin (LIPITOR) 20 mg tablet Take 1 tablet (20 mg total) by mouth daily    benztropine (COGENTIN) 0.5 mg tablet every 24 hours    Blood Pressure KIT Use 2 (two) times a day    clonazePAM (KlonoPIN) 2 mg tablet TK 1 T PO HS PRN.    cyanocobalamin (VITAMIN B-12) 1,000 mcg tablet Take 1 tablet (1,000 mcg total) by mouth daily (Patient not taking: Reported on 6/29/2021)    fluticasone (FLONASE) 50 mcg/act nasal spray 1 spray into each nostril daily    guaiFENesin (MUCINEX) 600 mg 12 hr tablet Take 2 tablets (1,200 mg total) by mouth every 12 (twelve) hours    ibuprofen (MOTRIN) 600 mg tablet Take 1 tablet (600 mg total) by mouth every 6 (six) hours as needed for mild pain    lisinopril (ZESTRIL) 40 mg tablet Take 1 tablet (40 mg total) by mouth daily    loratadine (CLARITIN) 10 mg tablet Take 1 tablet (10 mg total) by mouth daily    methocarbamol (ROBAXIN) 500 mg tablet Take 1 tablet (500 mg total) by mouth 2 (two) times a day as needed for muscle spasms    mirtazapine (REMERON) 30 mg tablet TK 1 T PO HS    Multiple Vitamin (multivitamin) tablet Take 1 tablet by mouth daily    sodium chloride (OCEAN) 0.65 % nasal spray 2 sprays into each nostril as needed for congestion    Symbicort 80-4.5 MCG/ACT inhaler Inhale 2 puffs 2 (two) times a day Rinse mouth after use    tamsulosin (FLOMAX) 0.4 mg Take 1 capsule (0.4 mg total) by mouth daily with dinner    tetrahydrozoline (Eye Drops) 0.05 % ophthalmic solution  "Administer 2 drops to both eyes 4 (four) times a day as needed for irritation    venlafaxine (EFFEXOR-XR) 150 mg 24 hr capsule TK ONE C PO D    venlafaxine (EFFEXOR-XR) 75 mg 24 hr capsule TK ONE C PO QD WF AND WITH VENLAFAXINE 150 MG    zolpidem (AMBIEN) 10 mg tablet TK 1 T PO HS PRF SLEEP    [DISCONTINUED] clonazePAM (KlonoPIN) 1 mg tablet TK 1 T PO BID PRA (Patient not taking: No sig reported)    [DISCONTINUED] diphenhydrAMINE (BENADRYL) 25 mg tablet Take 1 tablet (25 mg total) by mouth every 6 (six) hours as needed for itching for up to 5 days    [DISCONTINUED] finasteride (PROSCAR) 5 mg tablet Take 1 tablet (5 mg total) by mouth daily    [DISCONTINUED] lidocaine-prilocaine (EMLA) cream  (Patient not taking: Reported on 6/29/2021)    [DISCONTINUED] omeprazole (PriLOSEC) 20 mg delayed release capsule Take 1 capsule (20 mg total) by mouth daily (Patient not taking: Reported on 6/29/2021)       Objective     /80 (BP Location: Right arm, Patient Position: Sitting, Cuff Size: Standard)   Pulse 87   Temp 97.5 °F (36.4 °C) (Temporal)   Resp 16   Ht 5' 7\" (1.702 m)   Wt 78.5 kg (173 lb)   SpO2 98%   BMI 27.10 kg/m²     Physical Exam  Constitutional:       General: He is not in acute distress.     Appearance: Normal appearance. He is not toxic-appearing.   HENT:      Head: Normocephalic and atraumatic.      Nose: Nose normal.      Mouth/Throat:      Mouth: Mucous membranes are moist.   Eyes:      Conjunctiva/sclera: Conjunctivae normal.   Cardiovascular:      Rate and Rhythm: Normal rate and regular rhythm.      Heart sounds: Normal heart sounds.   Pulmonary:      Effort: Pulmonary effort is normal. No respiratory distress.      Breath sounds: Normal breath sounds. No wheezing.   Musculoskeletal:      Right lower leg: No edema.      Left lower leg: No edema.   Skin:     General: Skin is warm.   Neurological:      Mental Status: He is alert.   Psychiatric:         Mood and Affect: Mood normal.         " Behavior: Behavior normal.         Thought Content: Thought content normal.         Judgment: Judgment normal.       Josephine Rooney MD

## 2024-03-11 NOTE — ASSESSMENT & PLAN NOTE
- On Flomax 0.4 mg daily and continues to have increased urinary frequency  - Reports not being on Finasteride, however prescribed in the past  - Will restart finasteride; continue Flomax  - Patient due for PSA screening  - Return in 1 month

## 2024-03-11 NOTE — TELEPHONE ENCOUNTER
Spoke with PCP about flag interaction and chart.  Full interaction check performed using [employer] software.    No issues taking Cogentin, Abilify, and finasteride together    Pharmacist Tracking Tool  Reason For Outreach: Embedded Pharmacist  Demographics:  Intervention Method: Chart Review  Type of Intervention: Follow-Up  Topics Addressed: Polypharmacy  Pharmacologic Interventions: Drug Interaction   Non-Pharmacologic Interventions: Care coordination  Time:  Direct Patient Care:  0  mins  Care Coordination:  5  mins  Recommendation Recipient: Provider  Outcome: Accepted     Florentin Godoy, PharmD, BCACP  Ambulatory Care Clinical Pharmacist      Itraconazole Pregnancy And Lactation Text: This medication is Pregnancy Category C and it isn't know if it is safe during pregnancy. It is also excreted in breast milk.

## 2024-03-11 NOTE — ASSESSMENT & PLAN NOTE
Lab Results   Component Value Date    EGFR 78 01/03/2024    EGFR 80 01/09/2023    EGFR 69 12/12/2022    CREATININE 1.04 01/03/2024    CREATININE 1.03 01/09/2023    CREATININE 1.10 12/12/2022     -GFR overall stable  -Continue good blood pressure and cholesterol control  - Recheck BMP in 3 months

## 2024-03-27 ENCOUNTER — RA CDI HCC (OUTPATIENT)
Dept: OTHER | Facility: HOSPITAL | Age: 69
End: 2024-03-27

## 2024-04-14 NOTE — PROGRESS NOTES
Hematology/Oncology Outpatient Follow-up  Merly Sheffield 59 y o  male 1955 [id number]    Date:  1/3/2020        Assessment and Plan:  1  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[address] )  The patient does not seem to have any clinical hint of recurrence of his colon cancer  I did encourage him to follow up with Dr Susy De La Cruz to consider annual colonoscopy  He will be seen again about 6 months from now for close follow-up  - CBC and differential; Future  - Comprehensive metabolic panel; Future  - Magnesium; Future  - CEA; Future          HPI:  The patient came today for a follow-up visit  He completed his adjuvant chemotherapy for the sigmoid colon cancer around October 2018  His energy is great  He stated that he saw his colorectal surgeon Dr Susy De La Cruz couple months ago  He also had a CT scan of the chest abdomen pelvis on 08/08/2019 which showed:  IMPRESSION:     No signs of metastatic disease to the chest, abdomen or pelvis      3 right apical pulmonary groundglass densities without solid components measuring 5 to 6 mm are stable since the 3/5/2018 study  Based on current Fleischner Society 2017 Guidelines on incidental pulmonary nodule, additional followup CT is recommended for   every 2 years until 5 years of stability is demonstrated  His most recent available blood work from 10/28/2019 showed normal CBC a normal CMP  His CEA level was not checked  Oncology History    Mr Humberto Duke was diagnosed with rectosigmoid adenocarcinoma in March of 2018  He was in his usual state of health until he started to notice blood per rectum with each bowel movement for approximately 2 months  He had a colonoscopy February 27th 2018 which revealed a rectosigmoid junction mass  Biopsy revealed moderately differentiated adenocarcinoma  Staging CT of the chest abdomen and pelvis on March 5, 2018 showed no significant hand of any metastatic process in the liver    He did have 3-5 mm round ground-glass opacities within the periphery periphery of the right upper lobe of the lung, 3-6 month follow-up was recommended  CT also revealed focal narrowing of the mid sigmoid colon with associated wall thickening and mild prostatic hypertrophy and posterior bladder diverticuli  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[facility] [address] )    3/28/2018 Initial Diagnosis     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[facility] [address] )  Stage IIIC      3/28/2018 Surgery      Low anterior resection of the rectosigmoid sigmoid junction mass by Dr Adam Sanchez  Stage IIIc (T4, N2a, M0)      5/1/2018 - 10/1/2018 Chemotherapy     1  Adjuvant [id number]   Completed 12 cycles         Interval history:    ROS: Review of Systems   Constitutional: Negative for appetite change, diaphoresis, fatigue and fever  HENT: Negative for congestion, dental problem, facial swelling, hearing loss, tinnitus and trouble swallowing  Eyes: Negative for visual disturbance  Respiratory: Positive for cough  Negative for chest tightness, shortness of breath and wheezing  Cardiovascular: Negative for chest pain and leg swelling  Gastrointestinal: Negative for abdominal distention, abdominal pain, blood in stool, constipation, diarrhea, nausea and vomiting  Genitourinary: Negative for dysuria, hematuria and urgency  Musculoskeletal: Negative for arthralgias, myalgias and neck pain  Skin: Negative  Negative for color change, pallor, rash and wound  Neurological: Negative for dizziness, weakness, numbness and headaches  Hematological: Negative for adenopathy  Psychiatric/Behavioral: Negative for agitation, behavioral problems, confusion, hallucinations, self-injury and sleep disturbance  The patient is not nervous/anxious and is not hyperactive          Past Medical History:   Diagnosis Date    Colon cancer ([facility] [address] )     Depression     Hypertension     Psychiatric disorder        Past Surgical History:   Procedure Laterality Date    ABDOMINAL SURGERY      PORTACATH PLACEMENT Right        Social History     Socioeconomic History    Marital status: Single     Spouse name: None    Number of children: None    Years of education: None    Highest education level: None   Occupational History    None   Social Needs    Financial resource strain: None    Food insecurity:     Worry: None     Inability: None    Transportation needs:     Medical: None     Non-medical: None   Tobacco Use    Smoking status: Former Smoker    Smokeless tobacco: Never Used    Tobacco comment: quit 20 years ago    Substance and Sexual Activity    Alcohol use:  Yes     Alcohol/week: 2 0 - 3 0 standard drinks     Types: 2 - 3 Cans of beer per week     Comment: weekly    Drug use: No    Sexual activity: None   Lifestyle    Physical activity:     Days per week: None     Minutes per session: None    Stress: None   Relationships    Social connections:     Talks on phone: None     Gets together: None     Attends [employer] witness service: None     Active member of club or organization: None     Attends meetings of clubs or organizations: None     Relationship status: None    Intimate partner violence:     Fear of current or ex partner: None     Emotionally abused: None     Physically abused: None     Forced sexual activity: None   Other Topics Concern    None   Social History Narrative    None       Family History   Problem Relation Age of Onset    No Known Problems Mother     No Known Problems Father        No Known Allergies      Current Outpatient Medications:     albuterol (PROAIR HFA) 90 mcg/act inhaler, Inhale 2 puffs every 6 (six) hours as needed for wheezing, Disp: 2 Inhaler, Rfl: 3    ARIPiprazole (ABILIFY) 20 MG tablet, TK 1 T PO QD , Disp: , Rfl: 2    ARTIFICIAL TEARS 1 4 % ophthalmic solution, USE 1 DROP IN BOTH EYES 3-4 TIMES PER DAY AS NEEDED, Disp: , Rfl: 0    aspirin (ECOTRIN LOW STRENGTH) 81 mg EC tablet, Take 1 tablet (81 mg total) by mouth daily, Disp: 30 tablet, Rfl: 2    benztropine (COGENTIN) 0 5 mg tablet, every 24 hours, Disp: , Rfl:     clonazePAM (KlonoPIN) 1 mg tablet, TK 1 T PO BID PRA, Disp: , Rfl: 2    clonazePAM (KlonoPIN) 2 mg tablet, TK 1 T PO HS PRN , Disp: , Rfl: 2    cyanocobalamin (VITAMIN B-12) 1,000 mcg tablet, Take 1 tablet (1,000 mcg total) by mouth daily, Disp: 30 tablet, Rfl: 2    fluticasone (FLOVENT DISKUS) 50 MCG/BLIST diskus inhaler, Inhale 1 puff 2 (two) times a day, Disp: 1 Inhaler, Rfl: 3    lidocaine-prilocaine (EMLA) cream, , Disp: , Rfl: 3    lisinopril (ZESTRIL) 40 mg tablet, Take 1 tablet (40 mg total) by mouth daily, Disp: 30 tablet, Rfl: 2    loratadine (CLARITIN) 10 mg tablet, Take 1 tablet (10 mg total) by mouth daily, Disp: 30 tablet, Rfl: 2    mirtazapine (REMERON) 30 mg tablet, TK 1 T PO HS, Disp: , Rfl: 2    omeprazole (PriLOSEC) 20 mg delayed release capsule, Take 1 capsule (20 mg total) by mouth daily, Disp: 30 capsule, Rfl: 2    ondansetron (ZOFRAN) 8 mg tablet, Take 1 tablet (8 mg total) by mouth every 8 (eight) hours as needed for nausea or vomiting, Disp: 20 tablet, Rfl: 3    Propylene Glycol-Glycerin 1-0 3 % SOLN, Use 3-4 times per day PRN, Disp: , Rfl:     venlafaxine (EFFEXOR-XR) 150 mg 24 hr capsule, TK ONE C PO D, Disp: , Rfl: 2    venlafaxine (EFFEXOR-XR) 75 mg 24 hr capsule, TK ONE C PO QD WF AND WITH VENLAFAXINE 150 MG, Disp: , Rfl: 2    zolpidem (AMBIEN) 10 mg tablet, TK 1 T PO HS PRF SLEEP, Disp: , Rfl: 2    atorvastatin (LIPITOR) 20 mg tablet, Take 1 tablet (20 mg total) by mouth daily for 30 days, Disp: 30 tablet, Rfl: 2    hydrochlorothiazide (MICROZIDE) 12 5 mg capsule, Take 1 capsule (12 5 mg total) by mouth every morning for 30 days, Disp: 30 capsule, Rfl: 2      Physical Exam:  /94 (BP Location: Left arm, Cuff Size: Adult)   Pulse 73   Temp 98 °F (36 7 °C) (Tympanic)   Resp 18   Ht 5' 7" (1 702 m)   Wt 80 kg (176 lb 6 4 oz)   SpO2 98%   BMI 27 63 kg/m²     Physical Exam   Constitutional: He is oriented to person, place, and time   He appears well-developed and well-nourished  HENT:   Head: Normocephalic and atraumatic  Nose: Nose normal    Mouth/Throat: Oropharynx is clear and moist    Eyes: Pupils are equal, round, and reactive to light  Conjunctivae and EOM are normal  Right eye exhibits no discharge  Left eye exhibits no discharge  No scleral icterus  Neck: Normal range of motion  Neck supple  No tracheal deviation present  No thyromegaly present  Cardiovascular: Normal rate, regular rhythm and normal heart sounds  Exam reveals no friction rub  No murmur heard  Pulmonary/Chest: Effort normal and breath sounds normal  No respiratory distress  He has no wheezes  He has no rales  He exhibits no tenderness  Abdominal: Soft  Bowel sounds are normal  He exhibits no distension and no mass  There is no hepatosplenomegaly, splenomegaly or hepatomegaly  There is no tenderness  There is no rebound and no guarding  Musculoskeletal: Normal range of motion  He exhibits no edema, tenderness or deformity  Lymphadenopathy:     He has no cervical adenopathy  Neurological: He is alert and oriented to person, place, and time  He has normal reflexes  He displays normal reflexes  No cranial nerve deficit  Coordination normal    Skin: Skin is warm and dry  No rash noted  No erythema  No pallor  Psychiatric: He has a normal mood and affect   His behavior is normal  Judgment and thought content normal          Labs:  Lab Results   Component Value Date    WBC 9 90 10/28/2019    HGB 15 4 10/28/2019    HCT 47 1 10/28/2019    MCV 83 10/28/2019     10/28/2019     Lab Results   Component Value Date     06/11/2018    K 4 2 10/28/2019     10/28/2019    CO2 31 (H) 10/28/2019    ANIONGAP 12 06/11/2018    BUN 13 10/28/2019    CREATININE 1 07 10/28/2019    GLUCOSE 101 (H) 06/11/2018    GLUF 117 (H) 10/28/2019    CALCIUM 9 2 10/28/2019    AST 27 10/28/2019    ALT 27 10/28/2019    ALKPHOS 61 10/28/2019    PROT 7 1 06/11/2018    BILITOT 0 3 06/11/2018 EGFR 73 10/28/2019     No results found for: TSH    Patient voiced understanding and agreement in the above discussion  Aware to contact our office with questions/symptoms in the interim  foot pain/injury

## 2024-06-10 ENCOUNTER — TELEPHONE (OUTPATIENT)
Dept: FAMILY MEDICINE CLINIC | Facility: CLINIC | Age: 69
End: 2024-06-10

## 2024-06-10 DIAGNOSIS — N40.1 BENIGN PROSTATIC HYPERPLASIA WITH URINARY FREQUENCY: ICD-10-CM

## 2024-06-10 DIAGNOSIS — R35.0 BENIGN PROSTATIC HYPERPLASIA WITH URINARY FREQUENCY: ICD-10-CM

## 2024-06-10 RX ORDER — FINASTERIDE 5 MG/1
5 TABLET, FILM COATED ORAL DAILY
Qty: 30 TABLET | Refills: 5 | Status: SHIPPED | OUTPATIENT
Start: 2024-06-10

## 2024-07-11 DIAGNOSIS — I10 BENIGN ESSENTIAL HYPERTENSION: ICD-10-CM

## 2024-07-11 RX ORDER — LISINOPRIL 40 MG/1
40 TABLET ORAL DAILY
Qty: 90 TABLET | Refills: 1 | Status: SHIPPED | OUTPATIENT
Start: 2024-07-11

## 2024-10-01 ENCOUNTER — RA CDI HCC (OUTPATIENT)
Dept: OTHER | Facility: HOSPITAL | Age: 69
End: 2024-10-01

## 2024-10-15 ENCOUNTER — OFFICE VISIT (OUTPATIENT)
Dept: FAMILY MEDICINE CLINIC | Facility: CLINIC | Age: 69
End: 2024-10-15

## 2024-10-15 VITALS
SYSTOLIC BLOOD PRESSURE: 130 MMHG | HEIGHT: 67 IN | WEIGHT: 167 LBS | OXYGEN SATURATION: 98 % | TEMPERATURE: 98.7 F | HEART RATE: 87 BPM | BODY MASS INDEX: 26.21 KG/M2 | DIASTOLIC BLOOD PRESSURE: 74 MMHG | RESPIRATION RATE: 18 BRPM

## 2024-10-15 DIAGNOSIS — C19 RECTOSIGMOID JUNCTION CARCINOMA (HCC): Chronic | ICD-10-CM

## 2024-10-15 DIAGNOSIS — R35.0 BENIGN PROSTATIC HYPERPLASIA WITH URINARY FREQUENCY: ICD-10-CM

## 2024-10-15 DIAGNOSIS — Z23 ENCOUNTER FOR IMMUNIZATION: ICD-10-CM

## 2024-10-15 DIAGNOSIS — E78.5 HYPERLIPIDEMIA, UNSPECIFIED HYPERLIPIDEMIA TYPE: ICD-10-CM

## 2024-10-15 DIAGNOSIS — I10 BENIGN ESSENTIAL HYPERTENSION: ICD-10-CM

## 2024-10-15 DIAGNOSIS — J45.40 MODERATE PERSISTENT ASTHMA WITHOUT COMPLICATION: ICD-10-CM

## 2024-10-15 DIAGNOSIS — N40.1 BENIGN PROSTATIC HYPERPLASIA WITH URINARY FREQUENCY: ICD-10-CM

## 2024-10-15 DIAGNOSIS — M54.50 CHRONIC LOW BACK PAIN WITHOUT SCIATICA, UNSPECIFIED BACK PAIN LATERALITY: Primary | ICD-10-CM

## 2024-10-15 DIAGNOSIS — G89.29 CHRONIC LOW BACK PAIN WITHOUT SCIATICA, UNSPECIFIED BACK PAIN LATERALITY: Primary | ICD-10-CM

## 2024-10-15 DIAGNOSIS — F20.9 SCHIZOPHRENIA, UNSPECIFIED TYPE (HCC): ICD-10-CM

## 2024-10-15 PROCEDURE — G0008 ADMIN INFLUENZA VIRUS VAC: HCPCS | Performed by: FAMILY MEDICINE

## 2024-10-15 PROCEDURE — 99214 OFFICE O/P EST MOD 30 MIN: CPT | Performed by: FAMILY MEDICINE

## 2024-10-15 PROCEDURE — 90662 IIV NO PRSV INCREASED AG IM: CPT | Performed by: FAMILY MEDICINE

## 2024-10-15 PROCEDURE — G2211 COMPLEX E/M VISIT ADD ON: HCPCS | Performed by: FAMILY MEDICINE

## 2024-10-15 RX ORDER — CLONAZEPAM 1 MG/1
1 TABLET ORAL
Qty: 30 TABLET | Refills: 0 | Status: SHIPPED | OUTPATIENT
Start: 2024-10-15 | End: 2024-10-16

## 2024-10-15 RX ORDER — ASPIRIN 81 MG/1
81 TABLET ORAL DAILY
Qty: 30 TABLET | Refills: 2 | Status: SHIPPED | OUTPATIENT
Start: 2024-10-15

## 2024-10-15 RX ORDER — TAMSULOSIN HYDROCHLORIDE 0.4 MG/1
0.4 CAPSULE ORAL
Qty: 90 CAPSULE | Refills: 0 | Status: SHIPPED | OUTPATIENT
Start: 2024-10-15

## 2024-10-15 RX ORDER — FINASTERIDE 5 MG/1
5 TABLET, FILM COATED ORAL DAILY
Qty: 30 TABLET | Refills: 5 | Status: SHIPPED | OUTPATIENT
Start: 2024-10-15

## 2024-10-15 RX ORDER — ARIPIPRAZOLE 20 MG/1
20 TABLET ORAL DAILY PRN
Qty: 30 TABLET | Refills: 2 | Status: CANCELLED | OUTPATIENT
Start: 2024-10-15

## 2024-10-15 RX ORDER — ATORVASTATIN CALCIUM 20 MG/1
20 TABLET, FILM COATED ORAL DAILY
Qty: 90 TABLET | Refills: 1 | Status: SHIPPED | OUTPATIENT
Start: 2024-10-15

## 2024-10-15 RX ORDER — POLYVINYL ALCOHOL 14 MG/ML
1 SOLUTION/ DROPS OPHTHALMIC AS NEEDED
Qty: 15 ML | Refills: 0 | Status: SHIPPED | OUTPATIENT
Start: 2024-10-15

## 2024-10-15 RX ORDER — ALBUTEROL SULFATE 90 UG/1
2 INHALANT RESPIRATORY (INHALATION) EVERY 6 HOURS PRN
Qty: 8.5 G | Refills: 3 | Status: SHIPPED | OUTPATIENT
Start: 2024-10-15

## 2024-10-15 RX ORDER — LISINOPRIL 40 MG/1
40 TABLET ORAL DAILY
Qty: 90 TABLET | Refills: 1 | Status: SHIPPED | OUTPATIENT
Start: 2024-10-15

## 2024-10-15 RX ORDER — IBUPROFEN 600 MG/1
600 TABLET, FILM COATED ORAL EVERY 6 HOURS PRN
Qty: 60 TABLET | Refills: 0 | Status: SHIPPED | OUTPATIENT
Start: 2024-10-15

## 2024-10-15 RX ORDER — METHOCARBAMOL 500 MG/1
500 TABLET, FILM COATED ORAL 2 TIMES DAILY PRN
Qty: 20 TABLET | Refills: 0 | Status: SHIPPED | OUTPATIENT
Start: 2024-10-15

## 2024-10-15 RX ORDER — BLOOD PRESSURE TEST KIT
KIT MISCELLANEOUS 2 TIMES DAILY
Qty: 1 KIT | Refills: 0 | Status: SHIPPED | OUTPATIENT
Start: 2024-10-15

## 2024-10-15 RX ORDER — FLUTICASONE PROPIONATE 50 MCG
1 SPRAY, SUSPENSION (ML) NASAL DAILY
Qty: 16 G | Refills: 0 | Status: SHIPPED | OUTPATIENT
Start: 2024-10-15

## 2024-10-16 PROBLEM — G89.29 CHRONIC LOW BACK PAIN WITHOUT SCIATICA: Status: ACTIVE | Noted: 2024-10-16

## 2024-10-16 PROBLEM — M54.50 CHRONIC LOW BACK PAIN WITHOUT SCIATICA: Status: ACTIVE | Noted: 2024-10-16

## 2024-10-16 PROBLEM — J45.909 UNCOMPLICATED ASTHMA: Status: ACTIVE | Noted: 2024-10-16

## 2024-10-16 RX ORDER — ZOLPIDEM TARTRATE 10 MG/1
10 TABLET ORAL
Start: 2024-10-16 | End: 2024-10-20

## 2024-10-16 RX ORDER — VENLAFAXINE HYDROCHLORIDE 150 MG/1
150 CAPSULE, EXTENDED RELEASE ORAL DAILY
Qty: 60 CAPSULE | Refills: 1 | Status: SHIPPED | OUTPATIENT
Start: 2024-10-16

## 2024-10-16 RX ORDER — BENZTROPINE MESYLATE 0.5 MG/1
0.5 TABLET ORAL EVERY 24 HOURS
Qty: 30 TABLET | Refills: 1 | Status: SHIPPED | OUTPATIENT
Start: 2024-10-16

## 2024-10-16 RX ORDER — ARIPIPRAZOLE 20 MG/1
20 TABLET ORAL DAILY
Qty: 30 TABLET | Refills: 1 | Status: SHIPPED | OUTPATIENT
Start: 2024-10-16

## 2024-10-16 NOTE — ASSESSMENT & PLAN NOTE
Chronic lower back pain for over 10 years   At times associated with neck pain  Right side pain more prominent then left  Mild tenderness noted on lower back  Denies any numbness or weakness  Pain is worse at night, and unable to sleep through it   Uses tylenol or motrin for relief, but ineffective  Will re-prescribe previous meds used for pain relief and muscle spasm    Plan  Cont Robaxin 500 mg tablet  Cont Motrin 600 mg tablet      Orders:    ibuprofen (MOTRIN) 600 mg tablet; Take 1 tablet (600 mg total) by mouth every 6 (six) hours as needed for mild pain    methocarbamol (ROBAXIN) 500 mg tablet; Take 1 tablet (500 mg total) by mouth 2 (two) times a day as needed for muscle spasms

## 2024-10-16 NOTE — PROGRESS NOTES
Ambulatory Visit  Name: Saji Blackman      : 1955      MRN: [id number]  Encounter Provider: Rosetta Scott MD  Encounter Date: 10/15/2024   Encounter department: [facility]    Assessment & Plan  Hyperlipidemia, unspecified hyperlipidemia type    Orders:    atorvastatin (LIPITOR) 20 mg tablet; Take 1 tablet (20 mg total) by mouth daily    Encounter for immunization    Orders:    influenza vaccine, high-dose, PF 0.5 mL (Fluzone High Dose)    Benign prostatic hyperplasia with urinary frequency  Asympomtomatic  Burning sensation during urination present at times  Denies other urinary changes noted    Plan  To cont home meds finasteride   To cont home meds tamsulosin      Orders:    finasteride (PROSCAR) 5 mg tablet; Take 1 tablet (5 mg total) by mouth daily    tamsulosin (FLOMAX) 0.4 mg; Take 1 capsule (0.4 mg total) by mouth daily with dinner    Benign essential hypertension  BP Readings from Last 3 Encounters:   10/15/24 130/74   24 142/80   24 126/78   HTN is well controlled with medication  Denies any headache or visual changes  Admits compliance to medication daily  To continue lisinopril    Plan  Cont home medication (lisinopril 40 mg tablet)    Orders:    lisinopril (ZESTRIL) 40 mg tablet; Take 1 tablet (40 mg total) by mouth daily    Blood Pressure KIT; Use 2 (two) times a day    aspirin (ECOTRIN LOW STRENGTH) 81 mg EC tablet; Take 1 tablet (81 mg total) by mouth daily    Chronic low back pain without sciatica, unspecified back pain laterality  Chronic lower back pain for over 10 years   At times associated with neck pain  Right side pain more prominent then left  Mild tenderness noted on lower back  Denies any numbness or weakness  Pain is worse at night, and unable to sleep through it   Uses tylenol or motrin for relief, but ineffective  Will re-prescribe previous meds used for pain relief and muscle spasm    Plan  Cont Robaxin 500 mg tablet  Cont Motrin  600 mg tablet      Orders:    ibuprofen (MOTRIN) 600 mg tablet; Take 1 tablet (600 mg total) by mouth every 6 (six) hours as needed for mild pain    methocarbamol (ROBAXIN) 500 mg tablet; Take 1 tablet (500 mg total) by mouth 2 (two) times a day as needed for muscle spasms    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HCC)    As per chart, he has been lost to follow up since 2020  Denies any abdominal pain, bowel changes or drastic weight loss  No signs or symptoms of metastasis    Plan  To follow up with previous referral given for heme/onc to reestablish care         Schizophrenia, unspecified type (HCC)  According to the patient, his insurance will no longer cover [facility] and so he needs a new psych provider.  Will reduce dose of clonezepam from 1 mg to 0.5 mg due to recurrent episodes of syncope.  Order referral for psych for follow up    Plan  Start 0.5 mg Clonezepam  Cont Benztropine 0.5 mg tablet  Cont Aripiprazole 20 mg tablet  Cont Effexor 150 mg 24 hr capsule      Orders:    Ambulatory referral to Psych Services; Future    venlafaxine (EFFEXOR-XR) 150 mg 24 hr capsule; Take 1 capsule (150 mg total) by mouth daily    ARIPiprazole (ABILIFY) 20 MG tablet; Take 1 tablet (20 mg total) by mouth daily    benztropine (COGENTIN) 0.5 mg tablet; Take 1 tablet (0.5 mg total) by mouth every 24 hours    Moderate persistent asthma without complication  Denies any recent shortness of breath, cough, congestion, sneezing or rhinorrhea.  Patient doesn't recall the last time he used his albuterol, flonase and eye drops  To re-prescribe albuterol inhaler, flonase nasal spray and ophthalmic eye drop solution.    Plan   To cont home meds as mentioned above    Orders:    albuterol (PROVENTIL HFA,VENTOLIN HFA) 90 mcg/act inhaler; Inhale 2 puffs every 6 (six) hours as needed for wheezing    Artificial Tears 1.4 % ophthalmic solution; Administer 1 drop to both eyes as needed for dry eyes     fluticasone (FLONASE) 50 mcg/act nasal spray; 1 spray into each nostril daily       History of Present Illness     Patient is a 68 y/o male that presents today for refills for his pain medication for his back, HTN medications, allergy medications, BPH medications and psychiatric medications.    With regards to his back pain that he has been experiencing for the past 10 years, it has become unbearable to sleep through night with. For that reason he has come in today for a refill as he has been using tylenol and motrin with no refill. However he is able to ambulate well and denies any associated weakness, numbness or sensation changes. The pain is worse on sitting and improved on standing.    On further questioning he denies any associated headache or visual changes. He admits he had his last lisinopril dose today and need a refill. He states he is complaint to his medications.    Moreover with regards to his BPH, he states at times he experiences burning on urination other than that denies any other urinary changes to frequency, flow or color of urine.                    Review of Systems   Constitutional:  Negative for chills and fever.   HENT:  Negative for congestion, rhinorrhea, sinus pain, sneezing and sore throat.    Eyes:  Negative for pain and visual disturbance.   Respiratory:  Negative for cough and shortness of breath.    Cardiovascular:  Negative for chest pain and palpitations.   Gastrointestinal:  Negative for abdominal pain, blood in stool, constipation, diarrhea, nausea, rectal pain and vomiting.   Genitourinary:  Positive for dysuria. Negative for decreased urine volume, flank pain, frequency, hematuria and urgency.   Musculoskeletal:  Negative for arthralgias and back pain.   Skin:  Negative for color change and rash.   Neurological:  Negative for dizziness, seizures, syncope and light-headedness.   All other systems reviewed and are negative.          Objective     /74 (BP Location: Right  "arm, Patient Position: Sitting, Cuff Size: Large)   Pulse 87   Temp 98.7 °F (37.1 °C) (Temporal)   Resp 18   Ht 5' 7\" (1.702 m)   Wt 75.8 kg (167 lb)   SpO2 98%   BMI 26.16 kg/m²     Physical Exam  Vitals and nursing note reviewed.   Constitutional:       General: He is not in acute distress.     Appearance: He is well-developed.   HENT:      Head: Normocephalic and atraumatic.   Eyes:      Conjunctiva/sclera: Conjunctivae normal.   Cardiovascular:      Rate and Rhythm: Normal rate and regular rhythm.      Heart sounds: No murmur heard.  Pulmonary:      Effort: Pulmonary effort is normal. No respiratory distress.      Breath sounds: Normal breath sounds.   Abdominal:      Palpations: Abdomen is soft.      Tenderness: There is no abdominal tenderness.   Musculoskeletal:         General: No swelling.      Cervical back: Neck supple.   Skin:     General: Skin is warm and dry.      Capillary Refill: Capillary refill takes less than 2 seconds.   Neurological:      Mental Status: He is alert.   Psychiatric:         Mood and Affect: Mood normal.         "

## 2024-10-17 NOTE — ASSESSMENT & PLAN NOTE
Asympomtomatic  Burning sensation during urination present at times  Denies other urinary changes noted    Plan  To cont home meds finasteride   To cont home meds tamsulosin      Orders:    finasteride (PROSCAR) 5 mg tablet; Take 1 tablet (5 mg total) by mouth daily    tamsulosin (FLOMAX) 0.4 mg; Take 1 capsule (0.4 mg total) by mouth daily with dinner

## 2024-10-17 NOTE — ASSESSMENT & PLAN NOTE
BP Readings from Last 3 Encounters:   10/15/24 130/74   03/11/24 142/80   01/05/24 126/78   HTN is well controlled with medication  Denies any headache or visual changes  Admits compliance to medication daily  To continue lisinopril    Plan  Cont home medication (lisinopril 40 mg tablet)    Orders:    lisinopril (ZESTRIL) 40 mg tablet; Take 1 tablet (40 mg total) by mouth daily    Blood Pressure KIT; Use 2 (two) times a day    aspirin (ECOTRIN LOW STRENGTH) 81 mg EC tablet; Take 1 tablet (81 mg total) by mouth daily

## 2024-10-17 NOTE — ASSESSMENT & PLAN NOTE
According to the patient, his insurance will no longer cover [facility] and so he needs a new psych provider.  Will reduce dose of clonezepam from 1 mg to 0.5 mg due to recurrent episodes of syncope.  Order referral for psych for follow up    Plan  Start 0.5 mg Clonezepam  Cont Benztropine 0.5 mg tablet  Cont Aripiprazole 20 mg tablet  Cont Effexor 150 mg 24 hr capsule      Orders:    Ambulatory referral to Psych Services; Future    venlafaxine (EFFEXOR-XR) 150 mg 24 hr capsule; Take 1 capsule (150 mg total) by mouth daily    ARIPiprazole (ABILIFY) 20 MG tablet; Take 1 tablet (20 mg total) by mouth daily    benztropine (COGENTIN) 0.5 mg tablet; Take 1 tablet (0.5 mg total) by mouth every 24 hours

## 2024-10-17 NOTE — ASSESSMENT & PLAN NOTE
As per chart, he has been lost to follow up since 2020  Denies any abdominal pain, bowel changes or drastic weight loss  No signs or symptoms of metastasis    Plan  To follow up with previous referral given for heme/onc to reestablish care

## 2024-10-17 NOTE — ASSESSMENT & PLAN NOTE
Denies any recent shortness of breath, cough, congestion, sneezing or rhinorrhea.  Patient doesn't recall the last time he used his albuterol, flonase and eye drops  To re-prescribe albuterol inhaler, flonase nasal spray and ophthalmic eye drop solution.    Plan   To cont home meds as mentioned above

## 2024-10-17 NOTE — ASSESSMENT & PLAN NOTE
Denies any recent shortness of breath, cough, congestion, sneezing or rhinorrhea.  Patient doesn't recall the last time he used his albuterol, flonase and eye drops  To re-prescribe albuterol inhaler, flonase nasal spray and ophthalmic eye drop solution.    Plan   To cont home meds as mentioned above    Orders:    albuterol (PROVENTIL HFA,VENTOLIN HFA) 90 mcg/act inhaler; Inhale 2 puffs every 6 (six) hours as needed for wheezing    Artificial Tears 1.4 % ophthalmic solution; Administer 1 drop to both eyes as needed for dry eyes    fluticasone (FLONASE) 50 mcg/act nasal spray; 1 spray into each nostril daily

## 2024-10-18 ENCOUNTER — TELEPHONE (OUTPATIENT)
Dept: FAMILY MEDICINE CLINIC | Facility: CLINIC | Age: 69
End: 2024-10-18

## 2024-10-18 NOTE — TELEPHONE ENCOUNTER
PCP SIGNATURE NEEDED FOR [facility] pharmacy  FORM RECEIVED VIA FAX AND PLACED IN PCP FOLDER TO BE DELIVERED AT ASSIGNED TIMES.

## 2024-10-23 ENCOUNTER — TELEPHONE (OUTPATIENT)
Age: 69
End: 2024-10-23

## 2024-10-23 NOTE — TELEPHONE ENCOUNTER
Called Pt regarding an ASAP referral for Med mgmt services.  Writer spoke to Pt and informed of next available appts starting January, 2025. Pt stated that needs to schedule an appt sooner since he will be out of meds and previous psychiatrist no longer takes his insurance. Writer offered a packet with outside resources and advised to also contact his insurance for a list of participating providers where he can call and see if able to schedule sooner. Pt verbalized understanding.

## 2024-11-12 ENCOUNTER — OFFICE VISIT (OUTPATIENT)
Dept: FAMILY MEDICINE CLINIC | Facility: CLINIC | Age: 69
End: 2024-11-12

## 2024-11-12 VITALS
OXYGEN SATURATION: 98 % | WEIGHT: 167 LBS | HEART RATE: 93 BPM | BODY MASS INDEX: 26.21 KG/M2 | SYSTOLIC BLOOD PRESSURE: 112 MMHG | TEMPERATURE: 98.3 F | DIASTOLIC BLOOD PRESSURE: 70 MMHG | HEIGHT: 67 IN | RESPIRATION RATE: 18 BRPM

## 2024-11-12 DIAGNOSIS — F20.9 SCHIZOPHRENIA, UNSPECIFIED TYPE (HCC): Primary | ICD-10-CM

## 2024-11-12 DIAGNOSIS — G89.29 CHRONIC RIGHT-SIDED LOW BACK PAIN WITHOUT SCIATICA: ICD-10-CM

## 2024-11-12 DIAGNOSIS — R35.0 BENIGN PROSTATIC HYPERPLASIA WITH URINARY FREQUENCY: ICD-10-CM

## 2024-11-12 DIAGNOSIS — I10 BENIGN ESSENTIAL HYPERTENSION: ICD-10-CM

## 2024-11-12 DIAGNOSIS — C19 RECTOSIGMOID JUNCTION CARCINOMA (HCC): Chronic | ICD-10-CM

## 2024-11-12 DIAGNOSIS — E78.2 MIXED HYPERLIPIDEMIA: ICD-10-CM

## 2024-11-12 DIAGNOSIS — N40.1 BENIGN PROSTATIC HYPERPLASIA WITH URINARY FREQUENCY: ICD-10-CM

## 2024-11-12 DIAGNOSIS — M54.50 CHRONIC RIGHT-SIDED LOW BACK PAIN WITHOUT SCIATICA: ICD-10-CM

## 2024-11-12 DIAGNOSIS — F33.3 SEVERE EPISODE OF RECURRENT MAJOR DEPRESSIVE DISORDER, WITH PSYCHOTIC FEATURES (HCC): ICD-10-CM

## 2024-11-12 PROCEDURE — G2211 COMPLEX E/M VISIT ADD ON: HCPCS | Performed by: STUDENT IN AN ORGANIZED HEALTH CARE EDUCATION/TRAINING PROGRAM

## 2024-11-12 PROCEDURE — 99214 OFFICE O/P EST MOD 30 MIN: CPT | Performed by: STUDENT IN AN ORGANIZED HEALTH CARE EDUCATION/TRAINING PROGRAM

## 2024-11-12 RX ORDER — VENLAFAXINE HYDROCHLORIDE 75 MG/1
75 CAPSULE, EXTENDED RELEASE ORAL DAILY
Qty: 30 CAPSULE | Refills: 0 | Status: SHIPPED | OUTPATIENT
Start: 2024-11-12 | End: 2024-12-12

## 2024-11-12 NOTE — PROGRESS NOTES
Name: Saji Blackman      : 1955      MRN: [id number]  Encounter Provider: Rosetta Scott MD  Encounter Date: 2024   Encounter department: [facility]  :  Assessment & Plan  Schizophrenia, unspecified type (HCC)  [facility] more tan [facility] years  His insurance no longer covers this organization, currently awaiting follow up with new psychiatrist.  Currently experiencing auditory hallucinations of people calling him and making noises outside his home for the past 3 days. Denies any delusions or visual hallucinations present.  His medication aripiprazole 20 mg & benztropine 0.5 mg prescribed at last visit, were not released to the patient by the pharmacy, thus he hasn't been taking meds for the past week.   Denies any associated command hallucinations or visual hallucinations  Home meds; aripiprazole 20 mg, benztropine 0.5mg, clonezepam 0.5mg    Plan  To schedule expedited psych appointment next week (2024)  To reach out to pharmacy about the prescriptions not released to the patient from the last visit  Cont home meds once attained from the pharmacy         Benign essential hypertension  Asmptomatic HTN  BP well controlled   BP today: 112/70  His last lisinopril dose was today and requests a refill  He affirms compliance to his medication.  Home meds: Lisinopril 40 mg tablet    Plan  Cont home med       Chronic right-sided low back pain without sciatica  Chronic right paraspinal low back pain for over 10 years   Mild tenderness noted on right lower back  Denies any numbness or weakness  Pain is improved today as compared to last visit 4 weeks ago.   Able to sleep through night better  Taking prescribed motrin only twice a day     Plan  Cont Motrin 600 mg tablet Q6H PRN  Advised that he is allowed to take motrin every 6 hours as needed  To reach out to pharmacy about Robaxin 500 mg tablet prescription  not released to patient since last visit  To follow up on progress of pain control on next visit (11/26/2024)       Severe episode of recurrent major depressive disorder, with psychotic features (HCC)  Recurrence of MDD likely due to inadequate medical treatment   His medication effexor 150 mg prescribed at last visit, was not released to the patient by the pharmacy  Experiencing sadness for past 3 weeks, not preceding any incident in particular  Was very teary when answering PHQ screening questions  Associated auditory hallucinations present almost daily   Contemplated suicide 3 days ago, today not actively suicidal   Suicide plan: jump off bridge or self poison  No firearms present at home  Lives alone, but has a neighbour that checks up on him every other day  Family calls him everyday and visits him at least 3 times/ week   Home meds: effexor 150mg, effexor 75mg  Depression Screening Follow-up Plan: Patient's depression screening was positive with a PHQ-2 score of 3. Their PHQ-9 score was 9. Patient assessed for underlying major depression. They have no active suicidal ideations. Brief counseling provided and recommend additional follow-up/re-evaluation next office visit.    Plan  Prescribe Effexor 75 mg  To schedule expedited psych appointment next week (11/19/2024)   To reach out to pharmacy about effexor 150 mg prescription not released to patient  Informed patient it would take about 2 weeks before he is able to appreciate the maximum dose of both the 150 mg and 75 mg effexor doses combined    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HCC)  As per chart, he has been lost to follow up since 2020  Denies any abdominal pain, bowel changes or unintentional weight loss  No signs or symptoms of metastasis  Requesting to have chemo-port removed     Plan  To follow up with previous referral given for heme/onc to reestablish care  Will reach out to [facility] about having the port removed if no longer required       Benign  prostatic hyperplasia with urinary frequency  Asymptomatic BPH  Home med: finasteride 5 mg    Plan  Cont finasteride       Mixed hyperlipidemia  Lab Results   Component Value Date    CHOLESTEROL 214 (H) 12/12/2022    CHOLESTEROL 208 (H) 03/18/2021    CHOLESTEROL 215 (H) 10/28/2019     Lab Results   Component Value Date    HDL 46 12/12/2022    HDL 50 03/18/2021    HDL 58 10/28/2019     Lab Results   Component Value Date    TRIG 144 12/12/2022    TRIG 116 03/18/2021    TRIG 162 (H) 10/28/2019     Lab Results   Component Value Date    NONHDLC 168 12/12/2022    NONHDLC 158 03/18/2021    NONHDLC 157 10/28/2019   Home med: Lipitor 20 mg QD   ASCVD Risk 16.9%    Plan  Cont home med  Order lipid panel during next visit  Advise on lifestyle and healthy diet habits           Depression Screening and Follow-up Plan: Patient's depression screening was positive with a PHQ-2 score of 3. Their PHQ-9 score was 9.       History of Present Illness   Patient is a 70 y/o male with PMH of schizophrenia, Depression, HTN and chronic right lower back pain presenting today with complaints that he had not received his prescription refills of his psychiatric medications that were prescribed on his last visit. He states that he has started to have recurrence of auditory hallucinations for the past week that he had initially experienced about 11 years ago. In describing the auditory hallucinations, he admits that it is of noises & calling of his name outside and inside his home and when he checks, he finds no one present. He denies any command associated with the auditory hallucinations nor any visual hallucinations present. He denies hearing any voices that are talking to him directly. According to the patient his psychiatric medications had previously helped him get rid of the hallucinations, up until recently this past week.     He also states that the feelings of sadness have become more prominent in the past 3 weeks. He reports difficulty  concentrating, feels very lonely and feels like he has failed his mother back in [place] as he is unable to visit her for the past 50 years and misses her dearly. However he denies any change in appetite, sleep or difficulty finding pleasure in doing things he previously enjoyed. He states that recently about 3 days ago he had contemplated suicide by jumping off a bridge or poisoning himself and iterates that previously while on medication he didn't really contemplate suicide. He states the last time he was suicidal was over 2 years ago when he attempted to jump off a bridge and was admitted at a hospital in [place] for weeks. He reports he does not have a fire gun within his possession at home and is not actively contemplating suicide today or in the near future.    He lives alone, but admits he has a female neighbor he considers a friend and helps clean his house and checks up on him every other day. His children speak to him on a daily basis and his daughter and her family in [place] visit him about 3 times a week. His other kids live in [place] with their families.     As discussed on his last visit at the office on 10/15/2024, his current insurance does not cover the previous psychiatric organization [facility] that he had followed for the past 20 years, thus is still waiting to be schedule with a new psych provider.    On further questioning, he still complains of chronic right upper back pain. However he does report that his prescribed robaxin and motrin have helped improve the pain minimally and he is able to sleep better at night. He states he has been taking the motrin only once/ twice daily. He still denies any associated weakness or numbness experienced. The pain is improved on standing and exacerbated on sitting for long periods.                       Review of Systems   Constitutional:  Negative for chills and fever.   Eyes:  Negative for visual disturbance.   Respiratory:  " Negative for cough and shortness of breath.    Cardiovascular:  Negative for chest pain and palpitations.   Gastrointestinal:  Negative for abdominal pain, blood in stool, constipation, diarrhea and vomiting.   Genitourinary:  Negative for decreased urine volume, difficulty urinating, dysuria, flank pain, hematuria, penile discharge, testicular pain and urgency.   Musculoskeletal:  Positive for back pain. Negative for arthralgias, gait problem, joint swelling and neck stiffness.   Skin:  Negative for color change and rash.   Neurological:  Negative for dizziness, syncope, weakness, light-headedness and numbness.   Psychiatric/Behavioral:  Positive for decreased concentration, hallucinations (auditory) and suicidal ideas (jumping off bridge/ self poisoning).         Teary and depressed   All other systems reviewed and are negative.         Objective   /70 (BP Location: Right arm, Patient Position: Sitting, Cuff Size: Standard)   Pulse 93   Temp 98.3 °F (36.8 °C) (Temporal)   Resp 18   Ht 5' 7\" (1.702 m)   Wt 75.8 kg (167 lb)   SpO2 98%   BMI 26.16 kg/m²      Physical Exam  Vitals and nursing note reviewed.   Constitutional:       General: He is not in acute distress.     Appearance: He is well-developed.   HENT:      Head: Normocephalic and atraumatic.   Eyes:      Conjunctiva/sclera: Conjunctivae normal.   Cardiovascular:      Rate and Rhythm: Normal rate and regular rhythm.      Heart sounds: No murmur heard.  Pulmonary:      Effort: Pulmonary effort is normal. No respiratory distress.      Breath sounds: Normal breath sounds.   Musculoskeletal:         General: No swelling.      Cervical back: Neck supple.   Skin:     General: Skin is warm and dry.      Capillary Refill: Capillary refill takes less than 2 seconds.   Neurological:      Mental Status: He is alert and oriented to person, place, and time.   Psychiatric:      Comments: Depressed         "

## 2024-11-13 NOTE — ASSESSMENT & PLAN NOTE
Asmptomatic HTN  BP well controlled   BP today: 112/70  His last lisinopril dose was today and requests a refill  He affirms compliance to his medication.  Home meds: Lisinopril 40 mg tablet    Plan  Cont home med

## 2024-11-13 NOTE — ASSESSMENT & PLAN NOTE
Previously [facility] through with [facility] for more tan 20 years  His insurance no longer covers this organization, currently awaiting follow up with new psychiatrist.  Currently experiencing auditory hallucinations of people calling him and making noises outside his home for the past 3 days. Denies any delusions or visual hallucinations present.  His medication aripiprazole 20 mg & benztropine 0.5 mg prescribed at last visit, were not released to the patient by the pharmacy, thus he hasn't been taking meds for the past week.   Denies any associated command hallucinations or visual hallucinations  Home meds; aripiprazole 20 mg, benztropine 0.5mg, clonezepam 0.5mg    Plan  To schedule expedited psych appointment next week (11/19/2024)  To reach out to pharmacy about the prescriptions not released to the patient from the last visit  Cont home meds once attained from the pharmacy

## 2024-11-13 NOTE — ASSESSMENT & PLAN NOTE
Chronic right paraspinal low back pain for over 10 years   Mild tenderness noted on right lower back  Denies any numbness or weakness  Pain is improved today as compared to last visit 4 weeks ago.   Able to sleep through night better  Taking prescribed motrin only twice a day     Plan  Cont Motrin 600 mg tablet Q6H PRN  Advised that he is allowed to take motrin every 6 hours as needed  To reach out to pharmacy about Robaxin 500 mg tablet prescription not released to patient since last visit  To follow up on progress of pain control on next visit (11/26/2024)

## 2024-11-14 ENCOUNTER — TELEPHONE (OUTPATIENT)
Dept: FAMILY MEDICINE CLINIC | Facility: CLINIC | Age: 69
End: 2024-11-14

## 2024-11-14 DIAGNOSIS — R35.89 POLYURIA: Primary | ICD-10-CM

## 2024-11-14 NOTE — ASSESSMENT & PLAN NOTE
Recurrence of MDD likely due to inadequate medical treatment   His medication effexor 150 mg prescribed at last visit, was not released to the patient by the pharmacy  Experiencing sadness for past 3 weeks, not preceding any incident in particular  Was very teary when answering PHQ screening questions  Associated auditory hallucinations present almost daily   Contemplated suicide 3 days ago, today not actively suicidal   Suicide plan: jump off bridge or self poison  No firearms present at home  Lives alone, but has a neighbour that checks up on him every other day  Family calls him everyday and visits him at least 3 times/ week   Home meds: effexor 150mg, effexor 75mg  Depression Screening Follow-up Plan: Patient's depression screening was positive with a PHQ-2 score of 3. Their PHQ-9 score was 9. Patient assessed for underlying major depression. They have no active suicidal ideations. Brief counseling provided and recommend additional follow-up/re-evaluation next office visit.    Plan  Prescribe Effexor 75 mg  To schedule expedited psych appointment next week (11/19/2024)   To reach out to pharmacy about effexor 150 mg prescription not released to patient  Informed patient it would take about 2 weeks before he is able to appreciate the maximum dose of both the 150 mg and 75 mg effexor doses combined

## 2024-11-14 NOTE — ASSESSMENT & PLAN NOTE
As per chart, he has been lost to follow up since 2020  Denies any abdominal pain, bowel changes or unintentional weight loss  No signs or symptoms of metastasis  Requesting to have chemo-port removed     Plan  To follow up with previous referral given for heme/onc to reestablish care  Will reach out to [facility] about having the port removed if no longer required

## 2024-11-15 NOTE — ASSESSMENT & PLAN NOTE
Lab Results   Component Value Date    CHOLESTEROL 214 (H) 12/12/2022    CHOLESTEROL 208 (H) 03/18/2021    CHOLESTEROL 215 (H) 10/28/2019     Lab Results   Component Value Date    HDL 46 12/12/2022    HDL 50 03/18/2021    HDL 58 10/28/2019     Lab Results   Component Value Date    TRIG 144 12/12/2022    TRIG 116 03/18/2021    TRIG 162 (H) 10/28/2019     Lab Results   Component Value Date    NONHDLC 168 12/12/2022    NONHDLC 158 03/18/2021    NONHDLC 157 10/28/2019   Home med: Lipitor 20 mg QD   ASCVD Risk 16.9%    Plan  Cont home med  Order lipid panel during next visit  Advise on lifestyle and healthy diet habits

## 2024-11-15 NOTE — TELEPHONE ENCOUNTER
Used [place] interpretor- Lizandro [id number]  Informed patient that i reached out to his pharmacy.  They assured me that they have finally tried his insurance through for his psych meds and that it would be released to him and accessible in as of today evening itself.  Apologized to patient for the pharmacy error of not running his insurance through, and assured him that if it reccurs to reach out again.  Informed patient that Effexor would take around 2 weeks before it  starts to take effect.  Patient understood and had all questions answered.

## 2024-12-02 DIAGNOSIS — C19 RECTOSIGMOID JUNCTION CARCINOMA (HCC): Primary | Chronic | ICD-10-CM

## 2024-12-03 ENCOUNTER — OFFICE VISIT (OUTPATIENT)
Dept: FAMILY MEDICINE CLINIC | Facility: CLINIC | Age: 69
End: 2024-12-03

## 2024-12-03 ENCOUNTER — DOCUMENTATION (OUTPATIENT)
Dept: FAMILY MEDICINE CLINIC | Facility: CLINIC | Age: 69
End: 2024-12-03

## 2024-12-03 VITALS
WEIGHT: 169 LBS | OXYGEN SATURATION: 95 % | DIASTOLIC BLOOD PRESSURE: 70 MMHG | BODY MASS INDEX: 26.53 KG/M2 | RESPIRATION RATE: 18 BRPM | HEIGHT: 67 IN | SYSTOLIC BLOOD PRESSURE: 134 MMHG | TEMPERATURE: 97.7 F | HEART RATE: 83 BPM

## 2024-12-03 DIAGNOSIS — Z00.00 ENCOUNTER FOR ANNUAL WELLNESS VISIT (AWV) IN MEDICARE PATIENT: Primary | ICD-10-CM

## 2024-12-03 PROCEDURE — G0438 PPPS, INITIAL VISIT: HCPCS | Performed by: FAMILY MEDICINE

## 2024-12-03 NOTE — PATIENT INSTRUCTIONS
Medicare Preventive Visit Patient Instructions  Thank you for completing your Welcome to Medicare Visit or Medicare Annual Wellness Visit today. Your next wellness visit will be due in one year (12/6/2025).  The screening/preventive services that you may require over the next 5-10 years are detailed below. Some tests may not apply to you based off risk factors and/or age. Screening tests ordered at today's visit but not completed yet may show as past due. Also, please note that scanned in results may not display below.  Preventive Screenings:  Service Recommendations Previous Testing/Comments   Colorectal Cancer Screening  Colonoscopy    Fecal Occult Blood Test (FOBT)/Fecal Immunochemical Test (FIT)  Fecal DNA/Cologuard Test  Flexible Sigmoidoscopy Age: 45-75 years old   Colonoscopy: every 10 years (May be performed more frequently if at higher risk)  OR  FOBT/FIT: every 1 year  OR  Cologuard: every 3 years  OR  Sigmoidoscopy: every 5 years  Screening may be recommended earlier than age 45 if at higher risk for colorectal cancer. Also, an individualized decision between you and your healthcare provider will decide whether screening between the ages of 76-85 would be appropriate. Colonoscopy: 02/11/2022  FOBT/FIT: Not on file  Cologuard: Not on file  Sigmoidoscopy: Not on file    History Colorectal Cancer     Prostate Cancer Screening Individualized decision between patient and health care provider in men between ages of 55-69   Medicare will cover every 12 months beginning on the day after your 50th birthday PSA: No results in last 5 years     Risks and Benefits Discussed  Due for PSA     Hepatitis C Screening Once for adults born between 1945 and 1965  More frequently in patients at high risk for Hepatitis C Hep C Antibody: 03/18/2021    Screening Current   Diabetes Screening 1-2 times per year if you're at risk for diabetes or have pre-diabetes Fasting glucose: 94 mg/dL (12/12/2022)  A1C: 5.8 %  (12/12/2022)  Screening Current   Cholesterol Screening Once every 5 years if you don't have a lipid disorder. May order more often based on risk factors. Lipid panel: 12/12/2022  Screening Not Indicated  History Lipid Disorder      Other Preventive Screenings Covered by Medicare:  Abdominal Aortic Aneurysm (AAA) Screening: covered once if your at risk. You're considered to be at risk if you have a family history of AAA or a male between the age of 65-75 who smoking at least 100 cigarettes in your lifetime.  Lung Cancer Screening: covers low dose CT scan once per year if you meet all of the following conditions: (1) Age 55-77; (2) No signs or symptoms of lung cancer; (3) Current smoker or have quit smoking within the last 15 years; (4) You have a tobacco smoking history of at least 20 pack years (packs per day x number of years you smoked); (5) You get a written order from a healthcare provider.  Glaucoma Screening: covered annually if you're considered high risk: (1) You have diabetes OR (2) Family history of glaucoma OR (3)  aged 50 and older OR (4)  American aged 65 and older  Osteoporosis Screening: covered every 2 years if you meet one of the following conditions: (1) Have a vertebral abnormality; (2) On glucocorticoid therapy for more than 3 months; (3) Have primary hyperparathyroidism; (4) On osteoporosis medications and need to assess response to drug therapy.  HIV Screening: covered annually if you're between the age of 15-65. Also covered annually if you are younger than 15 and older than 65 with risk factors for HIV infection. For pregnant patients, it is covered up to 3 times per pregnancy.    Immunizations:  Immunization Recommendations   Influenza Vaccine Annual influenza vaccination during flu season is recommended for all persons aged >= 6 months who do not have contraindications   Pneumococcal Vaccine   * Pneumococcal conjugate vaccine = PCV13 (Prevnar 13), PCV15  (Vaxneuvance), PCV20 (Prevnar 20)  * Pneumococcal polysaccharide vaccine = PPSV23 (Pneumovax) Adults 19-65 yo with certain risk factors or if 65+ yo  If never received any pneumonia vaccine: recommend Prevnar 20 (PCV20)  Give PCV20 if previously received 1 dose of PCV13 or PPSV23   Hepatitis B Vaccine 3 dose series if at intermediate or high risk (ex: diabetes, end stage renal disease, liver disease)   Respiratory syncytial virus (RSV) Vaccine - COVERED BY MEDICARE PART D  * RSVPreF3 (Arexvy) CDC recommends that adults 60 years of age and older may receive a single dose of RSV vaccine using shared clinical decision-making (SCDM)   Tetanus (Td) Vaccine - COST NOT COVERED BY MEDICARE PART B Following completion of primary series, a booster dose should be given every 10 years to maintain immunity against tetanus. Td may also be given as tetanus wound prophylaxis.   Tdap Vaccine - COST NOT COVERED BY MEDICARE PART B Recommended at least once for all adults. For pregnant patients, recommended with each pregnancy.   Shingles Vaccine (Shingrix) - COST NOT COVERED BY MEDICARE PART B  2 shot series recommended in those 19 years and older who have or will have weakened immune systems or those 50 years and older     Health Maintenance Due:      Topic Date Due   • Hepatitis C Screening  Completed     Immunizations Due:      Topic Date Due   • COVID-19 Vaccine (3 - 2024-25 season) 09/01/2024     Advance Directives   What are advance directives?  Advance directives are legal documents that state your wishes and plans for medical care. These plans are made ahead of time in case you lose your ability to make decisions for yourself. Advance directives can apply to any medical decision, such as the treatments you want, and if you want to donate organs.   What are the types of advance directives?  There are many types of advance directives, and each state has rules about how to use them. You may choose a combination of any of the  following:  Living will:  This is a written record of the treatment you want. You can also choose which treatments you do not want, which to limit, and which to stop at a certain time. This includes surgery, medicine, IV fluid, and tube feedings.   Durable power of  for healthcare (DPAHC):  This is a written record that states who you want to make healthcare choices for you when you are unable to make them for yourself. This person, called a proxy, is usually a family member or a friend. You may choose more than 1 proxy.  Do not resuscitate (DNR) order:  A DNR order is used in case your heart stops beating or you stop breathing. It is a request not to have certain forms of treatment, such as CPR. A DNR order may be included in other types of advance directives.  Medical directive:  This covers the care that you want if you are in a coma, near death, or unable to make decisions for yourself. You can list the treatments you want for each condition. Treatment may include pain medicine, surgery, blood transfusions, dialysis, IV or tube feedings, and a ventilator (breathing machine).  Values history:  This document has questions about your views, beliefs, and how you feel and think about life. This information can help others choose the care that you would choose.  Why are advance directives important?  An advance directive helps you control your care. Although spoken wishes may be used, it is better to have your wishes written down. Spoken wishes can be misunderstood, or not followed. Treatments may be given even if you do not want them. An advance directive may make it easier for your family to make difficult choices about your care.   Fall Prevention    Fall prevention  includes ways to make your home and other areas safer. It also includes ways you can move more carefully to prevent a fall. Health conditions that cause changes in your blood pressure, vision, or muscle strength and coordination may increase  your risk for falls. Medicines may also increase your risk for falls if they make you dizzy, weak, or sleepy.   Fall prevention tips:   Stand or sit up slowly.    Use assistive devices as directed.    Wear shoes that fit well and have soles that .    Wear a personal alarm.    Stay active.    Manage your medical conditions.    Home Safety Tips:  Add items to prevent falls in the bathroom.    Keep paths clear.    Install bright lights in your home.    Keep items you use often on shelves within reach.    Paint or place reflective tape on the edges of your stairs.    Weight Management   Why it is important to manage your weight:  Being overweight increases your risk of health conditions such as heart disease, high blood pressure, type 2 diabetes, and certain types of cancer. It can also increase your risk for osteoarthritis, sleep apnea, and other respiratory problems. Aim for a slow, steady weight loss. Even a small amount of weight loss can lower your risk of health problems.  How to lose weight safely:  A safe and healthy way to lose weight is to eat fewer calories and get regular exercise. You can lose up about 1 pound a week by decreasing the number of calories you eat by 500 calories each day.   Healthy meal plan for weight management:  A healthy meal plan includes a variety of foods, contains fewer calories, and helps you stay healthy. A healthy meal plan includes the following:  Eat whole-grain foods more often.  A healthy meal plan should contain fiber. Fiber is the part of grains, fruits, and vegetables that is not broken down by your body. Whole-grain foods are healthy and provide extra fiber in your diet. Some examples of whole-grain foods are whole-wheat breads and pastas, oatmeal, brown rice, and bulgur.  Eat a variety of vegetables every day.  Include dark, leafy greens such as spinach, kale, salo greens, and mustard greens. Eat yellow and orange vegetables such as carrots, sweet potatoes, and  winter squash.   Eat a variety of fruits every day.  Choose fresh or canned fruit (canned in its own juice or light syrup) instead of juice. Fruit juice has very little or no fiber.  Eat low-fat dairy foods.  Drink fat-free (skim) milk or 1% milk. Eat fat-free yogurt and low-fat cottage cheese. Try low-fat cheeses such as mozzarella and other reduced-fat cheeses.  Choose meat and other protein foods that are low in fat.  Choose beans or other legumes such as split peas or lentils. Choose fish, skinless poultry (chicken or turkey), or lean cuts of red meat (beef or pork). Before you cook meat or poultry, cut off any visible fat.   Use less fat and oil.  Try baking foods instead of frying them. Add less fat, such as margarine, sour cream, regular salad dressing and mayonnaise to foods. Eat fewer high-fat foods. Some examples of high-fat foods include french fries, doughnuts, ice cream, and cakes.  Eat fewer sweets.  Limit foods and drinks that are high in sugar. This includes candy, cookies, regular soda, and sweetened drinks.  Exercise:  Exercise at least 30 minutes per day on most days of the week. Some examples of exercise include walking, biking, dancing, and swimming. You can also fit in more physical activity by taking the stairs instead of the elevator or parking farther away from stores. Ask your healthcare provider about the best exercise plan for you.      © Copyright [employer] 2018 Information is for End User's use only and may not be sold, redistributed or otherwise used for commercial purposes. All illustrations and images included in CareNotes® are the copyrighted property of [employer] or [employer]    Medicare Preventive Visit Patient Instructions  Thank you for completing your Welcome to Medicare Visit or Medicare Annual Wellness Visit today. Your next wellness visit will be due in one year (12/6/2025).  The screening/preventive services that you may require over the next 5-10 years  are detailed below. Some tests may not apply to you based off risk factors and/or age. Screening tests ordered at today's visit but not completed yet may show as past due. Also, please note that scanned in results may not display below.  Preventive Screenings:  Service Recommendations Previous Testing/Comments   Colorectal Cancer Screening  Colonoscopy    Fecal Occult Blood Test (FOBT)/Fecal Immunochemical Test (FIT)  Fecal DNA/Cologuard Test  Flexible Sigmoidoscopy Age: 45-75 years old   Colonoscopy: every 10 years (May be performed more frequently if at higher risk)  OR  FOBT/FIT: every 1 year  OR  Cologuard: every 3 years  OR  Sigmoidoscopy: every 5 years  Screening may be recommended earlier than age 45 if at higher risk for colorectal cancer. Also, an individualized decision between you and your healthcare provider will decide whether screening between the ages of 76-85 would be appropriate. Colonoscopy: 02/11/2022  FOBT/FIT: Not on file  Cologuard: Not on file  Sigmoidoscopy: Not on file    History Colorectal Cancer     Prostate Cancer Screening Individualized decision between patient and health care provider in men between ages of 55-69   Medicare will cover every 12 months beginning on the day after your 50th birthday PSA: No results in last 5 years     Risks and Benefits Discussed  Due for PSA     Hepatitis C Screening Once for adults born between 1945 and 1965  More frequently in patients at high risk for Hepatitis C Hep C Antibody: 03/18/2021    Screening Current   Diabetes Screening 1-2 times per year if you're at risk for diabetes or have pre-diabetes Fasting glucose: 94 mg/dL (12/12/2022)  A1C: 5.8 % (12/12/2022)  Screening Current   Cholesterol Screening Once every 5 years if you don't have a lipid disorder. May order more often based on risk factors. Lipid panel: 12/12/2022  Screening Not Indicated  History Lipid Disorder      Other Preventive Screenings Covered by Medicare:  Abdominal Aortic  Aneurysm (AAA) Screening: covered once if your at risk. You're considered to be at risk if you have a family history of AAA or a male between the age of 65-75 who smoking at least 100 cigarettes in your lifetime.  Lung Cancer Screening: covers low dose CT scan once per year if you meet all of the following conditions: (1) Age 55-77; (2) No signs or symptoms of lung cancer; (3) Current smoker or have quit smoking within the last 15 years; (4) You have a tobacco smoking history of at least 20 pack years (packs per day x number of years you smoked); (5) You get a written order from a healthcare provider.  Glaucoma Screening: covered annually if you're considered high risk: (1) You have diabetes OR (2) Family history of glaucoma OR (3)  aged 50 and older OR (4)  American aged 65 and older  Osteoporosis Screening: covered every 2 years if you meet one of the following conditions: (1) Have a vertebral abnormality; (2) On glucocorticoid therapy for more than 3 months; (3) Have primary hyperparathyroidism; (4) On osteoporosis medications and need to assess response to drug therapy.  HIV Screening: covered annually if you're between the age of 15-65. Also covered annually if you are younger than 15 and older than 65 with risk factors for HIV infection. For pregnant patients, it is covered up to 3 times per pregnancy.    Immunizations:  Immunization Recommendations   Influenza Vaccine Annual influenza vaccination during flu season is recommended for all persons aged >= 6 months who do not have contraindications   Pneumococcal Vaccine   * Pneumococcal conjugate vaccine = PCV13 (Prevnar 13), PCV15 (Vaxneuvance), PCV20 (Prevnar 20)  * Pneumococcal polysaccharide vaccine = PPSV23 (Pneumovax) Adults 19-65 yo with certain risk factors or if 65+ yo  If never received any pneumonia vaccine: recommend Prevnar 20 (PCV20)  Give PCV20 if previously received 1 dose of PCV13 or PPSV23   Hepatitis B Vaccine 3 dose  series if at intermediate or high risk (ex: diabetes, end stage renal disease, liver disease)   Respiratory syncytial virus (RSV) Vaccine - COVERED BY MEDICARE PART D  * RSVPreF3 (Arexvy) CDC recommends that adults 60 years of age and older may receive a single dose of RSV vaccine using shared clinical decision-making (SCDM)   Tetanus (Td) Vaccine - COST NOT COVERED BY MEDICARE PART B Following completion of primary series, a booster dose should be given every 10 years to maintain immunity against tetanus. Td may also be given as tetanus wound prophylaxis.   Tdap Vaccine - COST NOT COVERED BY MEDICARE PART B Recommended at least once for all adults. For pregnant patients, recommended with each pregnancy.   Shingles Vaccine (Shingrix) - COST NOT COVERED BY MEDICARE PART B  2 shot series recommended in those 19 years and older who have or will have weakened immune systems or those 50 years and older     Health Maintenance Due:      Topic Date Due   • Hepatitis C Screening  Completed     Immunizations Due:      Topic Date Due   • COVID-19 Vaccine (3 - 2024-25 season) 09/01/2024     Advance Directives   What are advance directives?  Advance directives are legal documents that state your wishes and plans for medical care. These plans are made ahead of time in case you lose your ability to make decisions for yourself. Advance directives can apply to any medical decision, such as the treatments you want, and if you want to donate organs.   What are the types of advance directives?  There are many types of advance directives, and each state has rules about how to use them. You may choose a combination of any of the following:  Living will:  This is a written record of the treatment you want. You can also choose which treatments you do not want, which to limit, and which to stop at a certain time. This includes surgery, medicine, IV fluid, and tube feedings.   Durable power of  for healthcare (DPAHC):  This is a  written record that states who you want to make healthcare choices for you when you are unable to make them for yourself. This person, called a proxy, is usually a family member or a friend. You may choose more than 1 proxy.  Do not resuscitate (DNR) order:  A DNR order is used in case your heart stops beating or you stop breathing. It is a request not to have certain forms of treatment, such as CPR. A DNR order may be included in other types of advance directives.  Medical directive:  This covers the care that you want if you are in a coma, near death, or unable to make decisions for yourself. You can list the treatments you want for each condition. Treatment may include pain medicine, surgery, blood transfusions, dialysis, IV or tube feedings, and a ventilator (breathing machine).  Values history:  This document has questions about your views, beliefs, and how you feel and think about life. This information can help others choose the care that you would choose.  Why are advance directives important?  An advance directive helps you control your care. Although spoken wishes may be used, it is better to have your wishes written down. Spoken wishes can be misunderstood, or not followed. Treatments may be given even if you do not want them. An advance directive may make it easier for your family to make difficult choices about your care.   Fall Prevention    Fall prevention  includes ways to make your home and other areas safer. It also includes ways you can move more carefully to prevent a fall. Health conditions that cause changes in your blood pressure, vision, or muscle strength and coordination may increase your risk for falls. Medicines may also increase your risk for falls if they make you dizzy, weak, or sleepy.   Fall prevention tips:   Stand or sit up slowly.    Use assistive devices as directed.    Wear shoes that fit well and have soles that .    Wear a personal alarm.    Stay active.    Manage your  medical conditions.    Home Safety Tips:  Add items to prevent falls in the bathroom.    Keep paths clear.    Install bright lights in your home.    Keep items you use often on shelves within reach.    Paint or place reflective tape on the edges of your stairs.    Weight Management   Why it is important to manage your weight:  Being overweight increases your risk of health conditions such as heart disease, high blood pressure, type 2 diabetes, and certain types of cancer. It can also increase your risk for osteoarthritis, sleep apnea, and other respiratory problems. Aim for a slow, steady weight loss. Even a small amount of weight loss can lower your risk of health problems.  How to lose weight safely:  A safe and healthy way to lose weight is to eat fewer calories and get regular exercise. You can lose up about 1 pound a week by decreasing the number of calories you eat by 500 calories each day.   Healthy meal plan for weight management:  A healthy meal plan includes a variety of foods, contains fewer calories, and helps you stay healthy. A healthy meal plan includes the following:  Eat whole-grain foods more often.  A healthy meal plan should contain fiber. Fiber is the part of grains, fruits, and vegetables that is not broken down by your body. Whole-grain foods are healthy and provide extra fiber in your diet. Some examples of whole-grain foods are whole-wheat breads and pastas, oatmeal, brown rice, and bulgur.  Eat a variety of vegetables every day.  Include dark, leafy greens such as spinach, kale, salo greens, and mustard greens. Eat yellow and orange vegetables such as carrots, sweet potatoes, and winter squash.   Eat a variety of fruits every day.  Choose fresh or canned fruit (canned in its own juice or light syrup) instead of juice. Fruit juice has very little or no fiber.  Eat low-fat dairy foods.  Drink fat-free (skim) milk or 1% milk. Eat fat-free yogurt and low-fat cottage cheese. Try low-fat  cheeses such as mozzarella and other reduced-fat cheeses.  Choose meat and other protein foods that are low in fat.  Choose beans or other legumes such as split peas or lentils. Choose fish, skinless poultry (chicken or turkey), or lean cuts of red meat (beef or pork). Before you cook meat or poultry, cut off any visible fat.   Use less fat and oil.  Try baking foods instead of frying them. Add less fat, such as margarine, sour cream, regular salad dressing and mayonnaise to foods. Eat fewer high-fat foods. Some examples of high-fat foods include french fries, doughnuts, ice cream, and cakes.  Eat fewer sweets.  Limit foods and drinks that are high in sugar. This includes candy, cookies, regular soda, and sweetened drinks.  Exercise:  Exercise at least 30 minutes per day on most days of the week. Some examples of exercise include walking, biking, dancing, and swimming. You can also fit in more physical activity by taking the stairs instead of the elevator or parking farther away from stores. Ask your healthcare provider about the best exercise plan for you.      © Copyright [employer] 2018 Information is for End User's use only and may not be sold, redistributed or otherwise used for commercial purposes. All illustrations and images included in CareNotes® are the copyrighted property of [employer] or [employer]

## 2024-12-03 NOTE — PROGRESS NOTES
Name: Saji Blackman      : 1955      MRN: [id number]  Encounter Provider: Rosetta Scott MD  Encounter Date: 12/3/2024   Encounter department: [facility]    Assessment & Plan  Encounter for annual wellness visit (AWV) in Medicare patient    Patient had no current active medical complaints.    Orders:    PSA, total and free; Future    US abdominal aorta screening aaa; Future    BMI Counseling: Body mass index is 26.47 kg/m². The BMI is above normal. Nutrition recommendations include encouraging healthy choices of fruits and vegetables, consuming healthier snacks, limiting drinks that contain sugar, moderation in carbohydrate intake and reducing intake of cholesterol. No pharmacotherapy was ordered. Rationale for BMI follow-up plan is due to patient being overweight or obese.     Depression Screening and Follow-up Plan: Patient was screened for depression during today's encounter. They screened negative with a PHQ-9 score of 3.      Preventive health issues were discussed with patient, and age appropriate screening tests were ordered as noted in patient's After Visit Summary. Personalized health advice and appropriate referrals for health education or preventive services given if needed, as noted in patient's After Visit Summary.    History of Present Illness     ASSESSMENT & PLAN      Saji Blackman was personally seen and evaluated at the [facility] outpatient clinic for follow-up by Dr. Scott who contacted Tayler Loera MD, for in-person consultation regarding psychiatric management recommendations. Previously, patient was noted to endorse suicidal ideation and worsening depression in setting of not having previous provider due to which Dr Scott had requested psychiatric services/support for patient as he was scheduled for in-person psychiatric consult at [facility]. Patient did not present for consult visit two weeks ago with Dr Loera and follow up  "visit last week with PCP Dr Scott due to being in [place] for vacation.     On evaluation, Saji endorsed feeling better today. He reports nervousness/anxiety most days of the week, some difficulty controlling his worrying, daily worrying about different things, difficulty relaxing on most days, irritability on most days of the week, decreased interest for a few days each week, decreased motivation a few days a week, sadness or depressed feelings a few days in the week, difficulty with sleeping several days a week, low energy a few days a week, poor appetite more than half of the week, negative feelings about self more than half the days in the week, and varying attention/concentration throughout the week. Presently, patient denies suicidal ideation, intent or plan at present in addition to thoughts of self-injury, citing \"life, daughter, friends\" as deterrents against self-harm; consents for safety, see risk assessment documented for further detail. He reported he last experienced passive suicidal ideation and passive death wishes 3-4 weeks ago. He denied experiencing active suicidal ideation, intent, or plan to harm self at that time. He denied homicidal ideation, intent, or plan at present. He denied access to weapons or firearms. 12/03/24 PHQ-9: 13. 12/03/24 GRANT-7: 13. Saji reports benefit and tolerability with current regimen. He denies problematic adverse medication concerns.      At conclusion of evaluation, patient is amenable to the recommendations of this writer including: attending PCP appointments as scheduled, keeping all scheduled appointments, and taking psychotropic medications responsibly. Should the patient experience any acute adverse effects of their medication regimen arise in addition to any comments or concerns pertaining to their psychiatric management, the patient is amenable to calling/contacting the outpatient office including this writer.  Patient is amenable to calling/contacting crisis " and/or attending to the nearest emergency department if their clinical condition deteriorates to assure their safety and stability.     At this time, will plan to continue current medication regimen as prescribed by his PCP, Dr Scott: Aripiprazole/Abilify 20 mg PO daily, Benztropine/Cogentin 0.5 mg PO daily, Clonazepam/Klonopin 0.5 mg daily, and Venlafaxine/Effexor  mg daily. No anticipated psychopharmacologic changes at this juncture. Risks and benefits as well as adverse effects of psychotropic medications were reviewed with Saji who expressed understanding and agreement with treatment recommendations. Recommended that Saji present to consult follow up visit in 2-4 weeks with medication list with details regarding how he is taking his medications, to which he expressed understanding and agreement during encounter. Outpatient level of care is deemed appropriate at this current time. Counseled Saji on safety planning, following which patient expressed understanding and agreement to call the office or report to their nearest Emergency Room for immediate evaluation if experiencing acute/worsening symptoms and no longer be able to contract for safety.      Additional Treatment Recommendations/Precautions:   ·     Continue current psychotropic medications as prescribed by Dr Scott (per 11/12/24 visit note): Aripiprazole/Abilify 20 mg PO daily, Benztropine/Cogentin 0.5 mg PO daily, Clonazepam/Klonopin 0.5 mg daily, and Venlafaxine/Effexor  mg daily      ·     Schedule next follow up visit in 2-4 weeks with Tayler Loera MD, for psychiatric consult follow up. Patient advised to bring in medication list for review.      Psychiatric Review of Symptoms:      Sleep change:  4-5 hours, early awakenings   Interest change: decreased   ·     Interests include: walking   Guilt/Hopelessness/helplessness/worthlessness: yes, on several days a week   Energy change: no   Concentration/Attention change: no  "  Appetite change: decreased for 6 months   ·     Weight changes & timeframe: decreased from 180 to 169 lbs over past few months   Suicidal ideation: yes, passive death wish, which last occurred 3-4 weeks ago prior to which occurring every few days per patient report   Homicidal ideation: no      Anxiety/panic attack: feels nervous or anxious at times, worrying at times, does not endorse that it impedes with his functioning   ·     Panic Disorder symptoms: no symptoms suggestive of panic disorder   ·     PTSD symptoms: denied   Eating Disorder symptoms: no historical or current eating disorder.      Auditory hallucinations:  history of AH, last occurred one week ago, reported AH of \"voices of unknown male\"   Visual hallucinations: no   Other perceptual disturbances: no   Delusional thinking: no      CIDI-based Bipolar Disorder Screening Scale      Euphoria Stem Question   1. Some people have periods lasting several days when they feel much more excited and full of energy than usual. Their minds go too fast. They talk a lot. They are very restless or unable to sit still and they sometimes do things that are unusual for them, such as driving too fast or   spending too much money.      Have you ever had a period like this lasting several days or longer? No      If this question is endorsed, the next question (the irritability stem question) is skipped and the respondent goes directly to the Criterion B screening question.      Irritability Stem Question   2. Have you ever had a period lasting several days or longer when most of the time you were so irritable or grouchy that you either started arguments, shouted at people or hit people? No      Criterion B Screening Question   3. People who have episodes like this often have changes in their thinking and behavior at the same time, like being more talkative, needing very little sleep, being very restless, going on buying sprees, and behaving in many ways they would " normally think inappropriate.      Did you ever have any of these changes during your episodes of being excited and full of   energy or very irritable or grouchy? No         SUICIDE/HOMICIDE RISK ASSESSMENT      1) Little interest or pleasure in doing things. Several or more days of the week? Yes   2) Feeling down, depressed or hopeless. Several or more days of the week? Yes      9) Thoughts that you would be better off dead or of hurting yourself in some way. Yes      Always ask questions 1 and 2. (In the Past Month)   1) Have you wished you were dead or wished you could go to sleep and not wake up? No   2) Have you actually had any thoughts about killing yourself? No      Always Ask Question 6.   6) Have you done anything, started to do anything, or prepared to do anything to end your life? No   If yes, was this within the past 3 months? No      7. What if any mood altering substances do you currently utilize including things like coffee? Coffee 1 cup daily, occasional social use of alcohol, denied use of tobacco/marijuana/cocaine/opioids      8. Do you have any history of mental health diagnosis? Yes, describe: depression per patient report      9. Do you have any history of inpatient hospitalization for a mental health condition? Yes, describe: 3 times; [place] most recent [facility]  12-13 years ago      Risk of Harm to Self:   ·     The following ratings are based on assessment at the time of the interview and review of records   ·     Demographic risk factors include: male, age: over 50 or older   ·     Historical Risk Factors include: chronic psychiatric problems, chronic depressive symptoms, history of depression, history of anxiety, history of psychosis   ·     Recent Specific Risk Factors include: mental illness diagnosis, current depressive symptoms, current anxiety symptoms, feelings of guilt or self blame, hopelessness, passive death wishes a few weeks ago   ·     Protective Factors: no current suicidal  ideation, able to manage anger well, being a parent, compliant with medications, connection to community, having a desire to be alive, restricted access to lethal means, supportive daughter, supportive friends   ·     Weapons: none. The following steps have been taken to ensure weapons are properly secured: not applicable   ·     Based on today's assessment, Saji presents the following risk of harm to self: low      Risk of Harm to Others:   ·     The following ratings are based on assessment at the time of the interview and review of records   ·     Demographic Risk Factors include: male.   ·     Historical Risk Factors include: none.   ·     Recent Specific Risk Factors include:multiple stressors, social difficulties.   ·     Protective Factors: no current homicidal ideation, being a parent, compliant with medications, connection to community, good impulse control, no substance use problems, restricted access to lethal means, supportive daughter, supportive friends   ·     Weapons: none. The following steps have been taken to ensure weapons are properly secured: not applicable   ·     Based on today's assessment, Saji presents the following risk of harm to others: low      The following interventions are recommended: no intervention changes needed. Although patient's acute lethality risk is low, long-term/chronic lethality risk is mildly elevated in the presence of social difficulties, everyday stressors, ongoing anxiety, and chronic mental illness. At the current moment, Saji is future-oriented, forward-thinking, and demonstrates ability to act in a self-preserving manner as evidenced by volitionally presenting to today's visit, seeking treatment, suggesting a will and desire to live. At this juncture, inpatient hospitalization is not currently warranted. To mitigate future risk, patient should adhere to the recommendations of this writer, avoid alcohol/illicit substance use, utilize community-based  resources and familiar support and prioritize mental health treatment. Safety plan below was created during the visit with patient.      Plan de Seguridad   Paso 1: Señal de advertencia: (Cuales son los signos or señales que necesito para usar ynes plan?)   1. Se siente depremido   2. Escucha a voces mas que normal   3. No tengo appetito y no derik      Paso 2: Estrategias internas de afrontamiento: (Cosas que puedo hacer para dejar de pensar mis problemas sin contactar otra persona)   1. Jugar dominos con mis amigos   2. Salgo caminos   3. Banarse      Paso 3: Personas y entornos sociales que proporcionan distraccion: (No es necesario que seun personas a las que les cuentes tus problemas)   1. Nombre: Friends, Daughter   Telephono: In phone   4. Lugar: [place]      Paso 4: Personas a las que puedo preguntar por ayuda:   1. Nombre: [place]   Telephono: In phone      [facility] calida de respuesta: Llamar [phone]. Esta linea calida de respuesta proveee servicios the telephono para residentes adultos (18 años y mayores) de [facility] que necesitan apoyo emocional, ayuda para resolver problemas o information y referidos. (Horas 7 wake[place] a la semana 6:00 AM and 2:00 AM)      Paso 5: Profesionales o agencias que puedo contactar carina yovani crisis:    1. La Línea Nacional de Prevención del Suicidio llama las 24 horas del día, los 7 días de la semana, para cualquier persona con pensamientos suicidas o que esté en crisis. Call: [phone]   2. Envía un mensaje de texto con la palabra AYUDA al [phone] para comunicarte de manera gratuita con un Consejero de Crisis. Apoyo gratuito las 24 horas del día, los 7 días de la semana, al alcance de tu mano.      3. Número de teléfono de crisis local: Call: [phone]   Paso 6: Hacer que el ambiente sea seguro: (pasos que se distancian de los medios letales por ejemplo: sera medicamentos a yovani persona de apoyo)   1: 911   2: [phone]      Based on today's assessment and clinical  "criteria, Saji Blackman is not an imminent risk of harm to self or others. Outpatient level of care is deemed appropriate at this present time. Saji understands that if they experience worsening symptoms or risk of harm, they need to call/contact the outpatient office including this writer, call/contact crisis and/or attend to the nearest Emergency Department for immediate evaluation.      Mental Status Evaluation:   Appearance: alert, appears stated age, casually dressed, and appropriate grooming and hygiene, blue baseball cap, chemo port on chest   Behavior: cooperative, calm, fair eye contact   Speech: normal rate, normal volume, normal pitch   Mood: \"good\"   Affect: normal range and intensity, appropriate   Thought process: Organized, logical, goal-directed   Associations: intact associations   Thought content: no overt delusions   Perceptual disturbances: no auditory hallucinations, no visual hallucinations, does not appear to be responding to internal stimuli   Risk potential: No active or passive suicidal or homicidal ideation was verbalized during interview   Potential for aggression - Not at present   Cognition: oriented to self and situation, appears to be of average intelligence, and cognition not formally tested   Muscle strength/Gait: normal muscle strength and normal muscle tone, normal gait and normal balance   Motor Activity: no abnormal movements   Insight: Fair   Judgment: Fair      Tayler Loera MD            Patient Care Team:  Rosetta Scott MD as PCP - General (Family Medicine)  [facility] as PCP - PCP-[facility] ([facility])  [facility] as PCP - PCP-[place] (RTE)    Review of Systems   Constitutional:  Negative for chills and fever.   HENT:  Negative for ear pain and sore throat.    Eyes:  Negative for pain and visual disturbance.   Respiratory:  Negative for cough and shortness of breath.    Cardiovascular:  Negative for chest pain and palpitations. "   Gastrointestinal:  Negative for abdominal pain and vomiting.   Genitourinary:  Negative for dysuria and hematuria.   Musculoskeletal:  Positive for back pain. Negative for arthralgias.   Skin:  Negative for color change and rash.   Neurological:  Negative for seizures and syncope.   Psychiatric/Behavioral:  Positive for hallucinations and suicidal ideas. Negative for agitation, confusion and self-injury.    All other systems reviewed and are negative.    Medical History Reviewed by provider this encounter:       Annual Wellness Visit Questionnaire       Health Risk Assessment:   Patient rates overall health as fair. Patient feels that their physical health rating is same. Patient is dissatisfied with their life. Eyesight was rated as much worse. Hearing was rated as same. Patient feels that their emotional and mental health rating is same. Patients states they are sometimes angry. Patient states they are sometimes unusually tired/fatigued. Pain experienced in the last 7 days has been a lot. Patient's pain rating has been 9/10. Patient states that he has experienced no weight loss or gain in last 6 months.     Depression Screening:   PHQ-9 Score: 3      Fall Risk Screening:   In the past year, patient has experienced: history of falling in past year    Number of falls: 2 or more  Injured during fall?: Yes    Feels unsteady when standing or walking?: Yes    Worried about falling?: Yes      Home Safety:  Patient has trouble with stairs inside or outside of their home. Patient has working smoke alarms and has working carbon monoxide detector. Home safety hazards include: none.     Nutrition:   Current diet is Regular.     Medications:   Patient is not currently taking any over-the-counter supplements. Patient is able to manage medications.     Activities of Daily Living (ADLs)/Instrumental Activities of Daily Living (IADLs):   Walk and transfer into and out of bed and chair?: Yes  Dress and groom yourself?: Yes     Bathe or shower yourself?: Yes    Feed yourself? Yes  Do your laundry/housekeeping?: Yes  Manage your money, pay your bills and track your expenses?: Yes  Make your own meals?: Yes    Do your own shopping?: Yes    Previous Hospitalizations:   Any hospitalizations or ED visits within the last 12 months?: Yes    How many hospitalizations have you had in the last year?: 3-4    Advance Care Planning:   Living will: No    Durable POA for healthcare: No    Advanced directive: No      PREVENTIVE SCREENINGS      Cardiovascular Screening:    General: Screening Not Indicated and History Lipid Disorder      Diabetes Screening:     General: Screening Current      Colorectal Cancer Screening:     General: History Colorectal Cancer      Prostate Cancer Screening:    General: Risks and Benefits Discussed    Due for: PSA      Abdominal Aortic Aneurysm (AAA) Screening:    Risk factors include: age between 65-74 yo and tobacco use        General: Screening Not Indicated    Due for: Screening AAA Ultrasound      Lung Cancer Screening:     General: Screening Not Indicated    Due for: Low Dose CT (LDCT)      Hepatitis C Screening:    General: Screening Current    Screening, Brief Intervention, and Referral to Treatment (SBIRT)    Screening      Single Item Drug Screening:  How often have you used an illegal drug (including marijuana) or a prescription medication for non-medical reasons in the past year? never    Single Item Drug Screen Score: 0  Interpretation: Negative screen for possible drug use disorder    SDOH Risk Assessment  Social determinants of health (SDOH) risk assesment tool was completed. The tool at a minimum covered housing stability, food insecurity, transportation needs, and utility difficulty. Patient had at risk responses for the following SDOH domains: financial resource strain and housing stability.     Social Drivers of Health     Financial Resource Strain: Medium Risk (12/3/2024)    Overall Financial Resource  "Strain (CARDIA)     Difficulty of Paying Living Expenses: Somewhat hard   Food Insecurity: No Food Insecurity (12/3/2024)    Hunger Vital Sign     Worried About Running Out of Food in the Last Year: Never true     Ran Out of Food in the Last Year: Never true   Transportation Needs: No Transportation Needs (12/3/2024)    PRAPARE - Transportation     Lack of Transportation (Medical): No     Lack of Transportation (Non-Medical): No   Housing Stability: High Risk (12/3/2024)    Housing Stability Vital Sign     Unable to Pay for Housing in the Last Year: Yes     Number of Times Moved in the Last Year: 1     Homeless in the Last Year: No   Utilities: Not At Risk (12/3/2024)    [facility] Utilities     Threatened with loss of utilities: No     No results found.    Objective   /70 (BP Location: Left arm, Patient Position: Sitting, Cuff Size: Standard)   Pulse 83   Temp 97.7 °F (36.5 °C) (Temporal)   Resp 18   Ht 5' 7\" (1.702 m)   Wt 76.7 kg (169 lb)   SpO2 95%   BMI 26.47 kg/m²     Physical Exam  Vitals and nursing note reviewed.   Constitutional:       General: He is not in acute distress.     Appearance: He is well-developed.   HENT:      Head: Normocephalic and atraumatic.      Nose: Nose normal.   Eyes:      Conjunctiva/sclera: Conjunctivae normal.   Cardiovascular:      Rate and Rhythm: Normal rate and regular rhythm.      Heart sounds: No murmur heard.  Pulmonary:      Effort: Pulmonary effort is normal. No respiratory distress.      Breath sounds: Normal breath sounds.   Abdominal:      Palpations: Abdomen is soft.      Tenderness: There is no abdominal tenderness.   Musculoskeletal:         General: No swelling.      Cervical back: Neck supple.   Skin:     General: Skin is warm and dry.      Capillary Refill: Capillary refill takes less than 2 seconds.   Neurological:      Mental Status: He is alert.   Psychiatric:         Mood and Affect: Mood normal.      Comments: Refer to HPI, as per psych consult         "

## 2024-12-04 NOTE — PROGRESS NOTES
"ASSESSMENT & RECOMMENDATIONS      Saji Blackman was personally seen and evaluated at the [facility] outpatient clinic for follow-up by Dr. Scott who contacted Tayler Loera MD, for in-person consultation regarding psychiatric management recommendations. Previously, patient was noted to endorse suicidal ideation and worsening depression in setting of not having previous provider due to which Dr Scott had requested psychiatric services/support for patient as he was scheduled for in-person psychiatric consult at [facility]. Patient did not present for consult visit two weeks ago with Dr Loera and follow up visit last week with PCP Dr Scott due to being in [place] for vacation.     On evaluation, Saji endorsed feeling better today. He reports nervousness/anxiety most days of the week, some difficulty controlling his worrying, daily worrying about different things, difficulty relaxing on most days, irritability on most days of the week, decreased interest for a few days each week, decreased motivation a few days a week, sadness or depressed feelings a few days in the week, difficulty with sleeping several days a week, low energy a few days a week, poor appetite more than half of the week, negative feelings about self more than half the days in the week, and varying attention/concentration throughout the week. Presently, patient denies suicidal ideation, intent or plan at present in addition to thoughts of self-injury, citing \"life, daughter, friends\" as deterrents against self-harm; consents for safety, see risk assessment documented for further detail. He reported he last experienced passive suicidal ideation and passive death wishes 3-4 weeks ago. He denied experiencing active suicidal ideation, intent, or plan to harm self at that time. He denied homicidal ideation, intent, or plan at present. He denied access to weapons or firearms. 12/03/24 PHQ-9: 13. 12/03/24 GRANT-7: 13. Saji reports benefit " and tolerability with current regimen. He denies problematic adverse medication concerns.      At conclusion of evaluation, patient is amenable to the recommendations of this writer including: attending PCP appointments as scheduled, keeping all scheduled appointments, and taking psychotropic medications responsibly. Should the patient experience any acute adverse effects of their medication regimen arise in addition to any comments or concerns pertaining to their psychiatric management, the patient is amenable to calling/contacting the outpatient office including this writer.  Patient is amenable to calling/contacting crisis and/or attending to the nearest emergency department if their clinical condition deteriorates to assure their safety and stability.     At this time, will plan to continue current medication regimen as prescribed by his PCP, Dr Scott: Aripiprazole/Abilify 20 mg PO daily, Benztropine/Cogentin 0.5 mg PO daily, Clonazepam/Klonopin 0.5 mg daily, and Venlafaxine/Effexor  mg daily. No anticipated psychopharmacologic changes at this juncture. Risks and benefits as well as adverse effects of psychotropic medications were reviewed with Saji who expressed understanding and agreement with treatment recommendations. Recommended that Saji present to consult follow up visit in 2-4 weeks with medication list with details regarding how he is taking his medications, to which he expressed understanding and agreement during encounter. Outpatient level of care is deemed appropriate at this current time. Counseled Saji on safety planning, following which patient expressed understanding and agreement to call the office or report to their nearest Emergency Room for immediate evaluation if experiencing acute/worsening symptoms and no longer be able to contract for safety.      Additional Treatment Recommendations/Precautions:   ·     Continue current psychotropic medications as prescribed by Dr Scott  "(per 11/12/24 visit note): Aripiprazole/Abilify 20 mg PO daily, Benztropine/Cogentin 0.5 mg PO daily, Clonazepam/Klonopin 0.5 mg daily, and Venlafaxine/Effexor  mg daily    ·     Schedule next follow up visit in 2-4 weeks with Tayler Loera MD, for psychiatric consult follow up. Patient advised to bring in medication list for review.      Psychiatric Review of Symptoms:      Sleep change:  4-5 hours, early awakenings   Interest change: decreased   ·     Interests include: walking   Guilt/Hopelessness/helplessness/worthlessness: yes, on several days a week   Energy change: no   Concentration/Attention change: no   Appetite change: decreased for 6 months   ·     Weight changes & timeframe: decreased from 180 to 169 lbs over past few months   Suicidal ideation: yes, passive death wish, which last occurred 3-4 weeks ago prior to which occurring every few days per patient report   Homicidal ideation: no      Anxiety/panic attack: feels nervous or anxious at times, worrying at times, does not endorse that it impedes with his functioning   ·     Panic Disorder symptoms: no symptoms suggestive of panic disorder   ·     PTSD symptoms: denied   Eating Disorder symptoms: no historical or current eating disorder.      Auditory hallucinations:  history of AH, last occurred one week ago, reported AH of \"voices of unknown male\"   Visual hallucinations: no   Other perceptual disturbances: no   Delusional thinking: no      CIDI-based Bipolar Disorder Screening Scale      Euphoria Stem Question   1. Some people have periods lasting several days when they feel much more excited and full of energy than usual. Their minds go too fast. They talk a lot. They are very restless or unable to sit still and they sometimes do things that are unusual for them, such as driving too fast or   spending too much money.      Have you ever had a period like this lasting several days or longer? No      If this question is endorsed, the next " question (the irritability stem question) is skipped and the respondent goes directly to the Criterion B screening question.      Irritability Stem Question   2. Have you ever had a period lasting several days or longer when most of the time you were so irritable or grouchy that you either started arguments, shouted at people or hit people? No      Criterion B Screening Question   3. People who have episodes like this often have changes in their thinking and behavior at the same time, like being more talkative, needing very little sleep, being very restless, going on buying sprees, and behaving in many ways they would normally think inappropriate.      Did you ever have any of these changes during your episodes of being excited and full of   energy or very irritable or grouchy? No         SUICIDE/HOMICIDE RISK ASSESSMENT      1) Little interest or pleasure in doing things. Several or more days of the week? Yes   2) Feeling down, depressed or hopeless. Several or more days of the week? Yes      9) Thoughts that you would be better off dead or of hurting yourself in some way. Yes      Always ask questions 1 and 2. (In the Past Month)   1) Have you wished you were dead or wished you could go to sleep and not wake up? No   2) Have you actually had any thoughts about killing yourself? No      Always Ask Question 6.   6) Have you done anything, started to do anything, or prepared to do anything to end your life? No   If yes, was this within the past 3 months? No      7. What if any mood altering substances do you currently utilize including things like coffee? Coffee 1 cup daily, occasional social use of alcohol, denied use of tobacco/marijuana/cocaine/opioids      8. Do you have any history of mental health diagnosis? Yes, describe: depression per patient report      9. Do you have any history of inpatient hospitalization for a mental health condition? Yes, describe: 3 times; [place] most recent [facility]  12-13 years ago       Risk of Harm to Self:   ·     The following ratings are based on assessment at the time of the interview and review of records   ·     Demographic risk factors include: male, age: over 50 or older   ·     Historical Risk Factors include: chronic psychiatric problems, chronic depressive symptoms, history of depression, history of anxiety, history of psychosis   ·     Recent Specific Risk Factors include: mental illness diagnosis, current depressive symptoms, current anxiety symptoms, feelings of guilt or self blame, hopelessness, passive death wishes a few weeks ago   ·     Protective Factors: no current suicidal ideation, able to manage anger well, being a parent, compliant with medications, connection to community, having a desire to be alive, restricted access to lethal means, supportive daughter, supportive friends   ·     Weapons: none. The following steps have been taken to ensure weapons are properly secured: not applicable   ·     Based on today's assessment, Saji presents the following risk of harm to self: low      Risk of Harm to Others:   ·     The following ratings are based on assessment at the time of the interview and review of records   ·     Demographic Risk Factors include: male.   ·     Historical Risk Factors include: none.   ·     Recent Specific Risk Factors include:multiple stressors, social difficulties.   ·     Protective Factors: no current homicidal ideation, being a parent, compliant with medications, connection to community, good impulse control, no substance use problems, restricted access to lethal means, supportive daughter, supportive friends   ·     Weapons: none. The following steps have been taken to ensure weapons are properly secured: not applicable   ·     Based on today's assessment, Saji presents the following risk of harm to others: low      The following interventions are recommended: no intervention changes needed. Although patient's acute lethality risk is  low, long-term/chronic lethality risk is mildly elevated in the presence of social difficulties, everyday stressors, ongoing anxiety, and chronic mental illness. At the current moment, Saji is future-oriented, forward-thinking, and demonstrates ability to act in a self-preserving manner as evidenced by volitionally presenting to today's visit, seeking treatment, suggesting a will and desire to live. At this juncture, inpatient hospitalization is not currently warranted. To mitigate future risk, patient should adhere to the recommendations of this writer, avoid alcohol/illicit substance use, utilize community-based resources and familiar support and prioritize mental health treatment. Safety plan below was created during the visit with patient.      Plan de Seguridad     Paso 1: Señal de advertencia: (Cuales son los signos or señales que necesito para usar ynes plan?)   1. Se siente depremido   2. Escucha a voces mas que normal   3. No tengo appetito y no derik      Paso 2: Estrategias internas de afrontamiento: (Cosas que puedo hacer para dejar de pensar mis problemas sin contactar otra persona)   1. Jugar dominos con mis amigos   2. Salgo caminos   3. Banarse      Paso 3: Personas y entornos sociales que proporcionan distraccion: (No es necesario que seun personas a las que les cuentes tus problemas)   1. Nombre: Friends, Daughter   Telephono: In phone   4. Lugar: [place]      Paso 4: Personas a las que puedo preguntar por ayuda:   1. Nombre: [place]   Telephono: In phone      [facility] Linea calida de respuesta: Aimee [phone]. Esta linea calida de respuesta proveee servicios the telephono para residentes adultos (18 años y mayores) de [facility] que necesitan apoyo emocional, ayuda para resolver problemas o information y referidos. (Horas 7 [place] a la semana 6:00 AM and 2:00 AM)      Paso 5: Profesionales o agencias que puedo contactar carina yovani crisis:    1. La Línea Nacional de Prevención del  "Suicidio llama las 24 horas del día, los 7 días de la semana, para cualquier persona con pensamientos suicidas o que esté en crisis. Call: [phone]   2. Envía un mensaje de texto con la palabra AYUDA al [phone] para comunicarte de manera gratuita con un Consejero de Crisis. Apoyo gratuito las 24 horas del día, los 7 días de la semana, al alcance de tu mano.      3. Número de teléfono de crisis local: Call: [phone]   Paso 6: Hacer que el ambiente sea seguro: (pasos que se distancian de los medios letales por ejemplo: sera medicamentos a yovani persona de apoyo)   1: 911   2: 988      Based on today's assessment and clinical criteria, Saji Blackman is not an imminent risk of harm to self or others. Outpatient level of care is deemed appropriate at this present time. Saji understands that if they experience worsening symptoms or risk of harm, they need to call/contact the outpatient office including this writer, call/contact crisis and/or attend to the nearest Emergency Department for immediate evaluation.      Mental Status Evaluation:   Appearance: alert, appears stated age, casually dressed, and appropriate grooming and hygiene, blue baseball cap, chemo port on chest   Behavior: cooperative, calm, fair eye contact   Speech: normal rate, normal volume, normal pitch   Mood: \"good\"   Affect: normal range and intensity, appropriate   Thought process: Organized, logical, goal-directed   Associations: intact associations   Thought content: no overt delusions   Perceptual disturbances: no auditory hallucinations, no visual hallucinations, does not appear to be responding to internal stimuli   Risk potential: No active or passive suicidal or homicidal ideation was verbalized during interview   Potential for aggression - Not at present   Cognition: oriented to self and situation, appears to be of average intelligence, and cognition not formally tested   Muscle strength/Gait: normal muscle strength and normal muscle tone, " normal gait and normal balance   Motor Activity: no abnormal movements   Insight: Fair   Judgment: Fair

## 2024-12-06 NOTE — PROGRESS NOTES
Name: Saji Blackman      : 1955      MRN: [id number]  Encounter Provider: Rosetta Scott MD  Encounter Date: 12/3/2024   Encounter department: [facility]    Assessment & Plan  Encounter for annual wellness visit (AWV) in Medicare patient    Orders:  •  PSA, total and free; Future  •  US abdominal aorta screening aaa; Future  •  CT lung screening program; Future         Preventive health issues were discussed with patient, and age appropriate screening tests were ordered as noted in patient's After Visit Summary. Personalized health advice and appropriate referrals for health education or preventive services given if needed, as noted in patient's After Visit Summary.    History of Present Illness   {?Quick Links Encounters * My Last Note * Last Note in Specialty * Snapshot * Since Last Visit * History :23266}  HPI   Patient Care Team:  Rosetta Scott MD as PCP - General (Family Medicine)  [facility] as PCP - PCP-[facility] ([facility])  [facility] as PCP - PCP-Printer (RTE)    Review of Systems  Medical History Reviewed by provider this encounter:       Annual Wellness Visit Questionnaire   Annual Wellness Visit  Social Drivers of Health     Financial Resource Strain: Medium Risk (12/3/2024)    Overall Financial Resource Strain (CARDIA)    • Difficulty of Paying Living Expenses: Somewhat hard   Food Insecurity: No Food Insecurity (12/3/2024)    Hunger Vital Sign    • Worried About Running Out of Food in the Last Year: Never true    • Ran Out of Food in the Last Year: Never true   Transportation Needs: No Transportation Needs (12/3/2024)    PRAPARE - Transportation    • Lack of Transportation (Medical): No    • Lack of Transportation (Non-Medical): No   Housing Stability: High Risk (12/3/2024)    Housing Stability Vital Sign    • Unable to Pay for Housing in the Last Year: Yes    • Number of Times Moved in the Last Year: 1    • Homeless  "in the Last Year: No   Utilities: Not At Risk (12/3/2024)    [facility] Utilities    • Threatened with loss of utilities: No     No results found.    Objective {?Quick Links Trend Vitals * Enter New Vitals * Results Review * Timeline (Adult) * Labs * Imaging * Cardiology * Procedures * Lung Cancer Screening * Surgical eConsent :90425}  /70 (BP Location: Left arm, Patient Position: Sitting, Cuff Size: Standard)   Pulse 83   Temp 97.7 °F (36.5 °C) (Temporal)   Resp 18   Ht 5' 7\" (1.702 m)   Wt 76.7 kg (169 lb)   SpO2 95%   BMI 26.47 kg/m²     Physical Exam  {Administrative / Billing Section (Optional):74931}  "

## 2024-12-29 DIAGNOSIS — R35.0 BENIGN PROSTATIC HYPERPLASIA WITH URINARY FREQUENCY: ICD-10-CM

## 2024-12-29 DIAGNOSIS — N40.1 BENIGN PROSTATIC HYPERPLASIA WITH URINARY FREQUENCY: ICD-10-CM

## 2024-12-31 ENCOUNTER — TELEPHONE (OUTPATIENT)
Dept: FAMILY MEDICINE CLINIC | Facility: CLINIC | Age: 69
End: 2024-12-31

## 2024-12-31 ENCOUNTER — TELEPHONE (OUTPATIENT)
Dept: RADIOLOGY | Facility: HOSPITAL | Age: 69
End: 2024-12-31

## 2024-12-31 RX ORDER — TAMSULOSIN HYDROCHLORIDE 0.4 MG/1
0.4 CAPSULE ORAL
Qty: 90 CAPSULE | Refills: 0 | Status: SHIPPED | OUTPATIENT
Start: 2024-12-31

## 2024-12-31 NOTE — TELEPHONE ENCOUNTER
first attempt to contact patient. no answer    Mail box not set up     Need to reschedule kati for 01/07/25 due to provider not available

## 2025-01-07 DIAGNOSIS — F33.3 SEVERE EPISODE OF RECURRENT MAJOR DEPRESSIVE DISORDER, WITH PSYCHOTIC FEATURES (HCC): ICD-10-CM

## 2025-01-07 RX ORDER — VENLAFAXINE HYDROCHLORIDE 75 MG/1
75 CAPSULE, EXTENDED RELEASE ORAL DAILY
Qty: 30 CAPSULE | Refills: 0 | Status: SHIPPED | OUTPATIENT
Start: 2025-01-07 | End: 2025-02-06

## 2025-01-07 NOTE — TELEPHONE ENCOUNTER
Patient is requesting refills for the following medications:      venlafaxine (EFFEXOR-XR) 75 mg 24 hr capsule       Clonazipam (Not on med list)    Please Advise

## 2025-01-09 ENCOUNTER — TELEPHONE (OUTPATIENT)
Age: 70
End: 2025-01-09

## 2025-01-09 NOTE — TELEPHONE ENCOUNTER
Phoned patient using  [id number].  Per patient, he would like to have his port flushed now, and removed later.  Patient will schedule removal, but would like to have it flushed now.   Patient confirmed he is not using the port and thinks it was last flushed approx 6 months ago.  Patient would like schedulers to reach out to schedule flush.        Reviewed with Dr. Ford.  Ok to flush port and schedule removal when convenient with patient.

## 2025-01-09 NOTE — TELEPHONE ENCOUNTER
Nicola calling in stating that they reached out to the pt to schedule his port removal, pt stated he cannot have it taken out just yet because he needs it for an appointment on the 28th. Pt just has an office appt on the 28th. Pt is stating he is having pain in his port. Pt is looking to have a call back form the office regarding his concerns. If port is ok to be removed, IR will need to be contacted, they are looking to schedule him for 1/17 to have removed.    Thank you.

## 2025-01-13 ENCOUNTER — HOSPITAL ENCOUNTER (OUTPATIENT)
Dept: INFUSION CENTER | Facility: HOSPITAL | Age: 70
Discharge: HOME | End: 2025-01-13
Payer: MEDICARE

## 2025-01-13 DIAGNOSIS — C19 RECTOSIGMOID JUNCTION CARCINOMA (HCC): ICD-10-CM

## 2025-01-13 DIAGNOSIS — Z45.2 ENCOUNTER FOR CENTRAL LINE CARE: Primary | ICD-10-CM

## 2025-01-13 PROCEDURE — 96523 IRRIG DRUG DELIVERY DEVICE: CPT

## 2025-01-13 NOTE — PROGRESS NOTES
Pt tolerated Port flush per protocol. Port flushes freely with brisk blood return. AVS provided and next apt confirmed. 2/24/25 @ 1200. Left unit ambulatory with a steady gait.

## 2025-01-16 ENCOUNTER — RA CDI HCC (OUTPATIENT)
Dept: OTHER | Facility: HOSPITAL | Age: 70
End: 2025-01-16

## 2025-01-28 ENCOUNTER — OFFICE VISIT (OUTPATIENT)
Dept: FAMILY MEDICINE CLINIC | Facility: CLINIC | Age: 70
End: 2025-01-28

## 2025-01-28 VITALS
RESPIRATION RATE: 14 BRPM | BODY MASS INDEX: 27.44 KG/M2 | OXYGEN SATURATION: 99 % | HEART RATE: 86 BPM | SYSTOLIC BLOOD PRESSURE: 130 MMHG | HEIGHT: 67 IN | DIASTOLIC BLOOD PRESSURE: 60 MMHG | TEMPERATURE: 97.5 F | WEIGHT: 174.8 LBS

## 2025-01-28 DIAGNOSIS — F20.9 SCHIZOPHRENIA, UNSPECIFIED TYPE (HCC): ICD-10-CM

## 2025-01-28 DIAGNOSIS — R35.0 BENIGN PROSTATIC HYPERPLASIA WITH URINARY FREQUENCY: ICD-10-CM

## 2025-01-28 DIAGNOSIS — N40.1 BENIGN PROSTATIC HYPERPLASIA WITH URINARY FREQUENCY: ICD-10-CM

## 2025-01-28 DIAGNOSIS — I10 BENIGN ESSENTIAL HYPERTENSION: Primary | ICD-10-CM

## 2025-01-28 DIAGNOSIS — G89.29 CHRONIC LOW BACK PAIN WITHOUT SCIATICA, UNSPECIFIED BACK PAIN LATERALITY: ICD-10-CM

## 2025-01-28 DIAGNOSIS — M54.50 CHRONIC LOW BACK PAIN WITHOUT SCIATICA, UNSPECIFIED BACK PAIN LATERALITY: ICD-10-CM

## 2025-01-28 DIAGNOSIS — F33.3 SEVERE EPISODE OF RECURRENT MAJOR DEPRESSIVE DISORDER, WITH PSYCHOTIC FEATURES (HCC): ICD-10-CM

## 2025-01-28 PROCEDURE — 99214 OFFICE O/P EST MOD 30 MIN: CPT | Performed by: FAMILY MEDICINE

## 2025-01-28 PROCEDURE — G2211 COMPLEX E/M VISIT ADD ON: HCPCS | Performed by: FAMILY MEDICINE

## 2025-01-28 RX ORDER — VENLAFAXINE HYDROCHLORIDE 150 MG/1
150 CAPSULE, EXTENDED RELEASE ORAL DAILY
Qty: 60 CAPSULE | Refills: 0 | Status: SHIPPED | OUTPATIENT
Start: 2025-01-28

## 2025-01-28 RX ORDER — TAMSULOSIN HYDROCHLORIDE 0.4 MG/1
0.4 CAPSULE ORAL
Qty: 90 CAPSULE | Refills: 0 | Status: SHIPPED | OUTPATIENT
Start: 2025-01-28

## 2025-01-28 RX ORDER — METHOCARBAMOL 500 MG/1
500 TABLET, FILM COATED ORAL 2 TIMES DAILY PRN
Qty: 60 TABLET | Refills: 0 | Status: SHIPPED | OUTPATIENT
Start: 2025-01-28 | End: 2025-02-04

## 2025-01-28 RX ORDER — VENLAFAXINE HYDROCHLORIDE 75 MG/1
75 CAPSULE, EXTENDED RELEASE ORAL DAILY
Qty: 90 CAPSULE | Refills: 0 | Status: SHIPPED | OUTPATIENT
Start: 2025-01-28

## 2025-01-28 RX ORDER — LISINOPRIL 40 MG/1
40 TABLET ORAL DAILY
Qty: 90 TABLET | Refills: 1 | Status: SHIPPED | OUTPATIENT
Start: 2025-01-28

## 2025-01-28 RX ORDER — ARIPIPRAZOLE 20 MG/1
20 TABLET ORAL DAILY
Qty: 90 TABLET | Refills: 0 | Status: SHIPPED | OUTPATIENT
Start: 2025-01-28

## 2025-01-28 RX ORDER — BENZTROPINE MESYLATE 0.5 MG/1
0.5 TABLET ORAL EVERY 24 HOURS
Qty: 90 TABLET | Refills: 0 | Status: SHIPPED | OUTPATIENT
Start: 2025-01-28

## 2025-01-28 NOTE — PROGRESS NOTES
"Name: Saji Blackman      : 1955      MRN: [id number]  Encounter Provider: Rosetta Scott MD  Encounter Date: 2025   Encounter department: [facility]  :  Assessment & Plan  Benign essential hypertension  Well controlled  130/60 today  Affirms compliance  Home med: lisinopril 40 mg QD    Plan  Cont home med  Orders:    lisinopril (ZESTRIL) 40 mg tablet; Take 1 tablet (40 mg total) by mouth daily    Severe episode of recurrent major depressive disorder, with psychotic features (HCC)  Likely due to running out of all psych medications for past 8 days  Reports hallucination that started about 4 days ago  Depressive symptoms, mildly present however not suicidal  Patient reports insurance gave him a physicians number to follow up  and set up appointments with (most probably a psychiatry- will follow up on next office visit)    Plan  Refill all psych medications  Have preceptor prescribe clonazepam 0.5 mg.  Orders:    venlafaxine (EFFEXOR-XR) 75 mg 24 hr capsule; Take 1 capsule (75 mg total) by mouth daily    Schizophrenia, unspecified type (HCC)  Refer to\"A/P\" severe depression  Orders:    venlafaxine (EFFEXOR-XR) 150 mg 24 hr capsule; Take 1 capsule (150 mg total) by mouth daily    ARIPiprazole (ABILIFY) 20 MG tablet; Take 1 tablet (20 mg total) by mouth daily    benztropine (COGENTIN) 0.5 mg tablet; Take 1 tablet (0.5 mg total) by mouth every 24 hours    Chronic low back pain without sciatica, unspecified back pain laterality  Motrin helps relief pain, however not really taking anymore due to almost no more pain.    Plan  Cont Motrin 600 mg as needed   Orders:    methocarbamol (ROBAXIN) 500 mg tablet; Take 1 tablet (500 mg total) by mouth 2 (two) times a day as needed for muscle spasms    Benign prostatic hyperplasia with urinary frequency  Asympomatic  Home med: tamsulosin    Plan  Cont home med  Orders:    tamsulosin (FLOMAX) 0.4 mg; Take 1 capsule (0.4 mg total) by " mouth daily with dinner           History of Present Illness   Patient is a 68 y/o male with PMH severe MDD with psychotic features, well controlled asthma, HTN and HLD who presents today for medication refill for his psychiatric medications and to have a letter written to affirm his medical psychiatric condition disabling him from working.    On further questioning, he states he has been starting to experience auditory hallucinations at times for the past 4 days since he ran out of his medications, however denies any commands associated with the hallucinations or suicidal intention noted. He reports he was given a new physician's number by his medical insurance to call and start following up with, most probably a psych therapist.     Of note, he is due to remove his chemo-port soon after his appointment on 2/21/2025 for evaluation by.              Review of Systems   Constitutional:  Negative for activity change, appetite change, chills, fatigue and fever.   HENT:  Positive for congestion. Negative for rhinorrhea and sneezing.    Eyes: Negative.    Respiratory:  Positive for cough. Negative for chest tightness and shortness of breath.    Cardiovascular:  Negative for chest pain and leg swelling.   Gastrointestinal:  Positive for diarrhea. Negative for abdominal distention, abdominal pain, blood in stool, constipation and vomiting.   Endocrine: Negative.    Genitourinary:  Negative for difficulty urinating, dysuria, flank pain, frequency and hematuria.   Musculoskeletal:  Negative for back pain and myalgias.   Skin: Negative.    Allergic/Immunologic: Negative.    Neurological:  Negative for dizziness, weakness, light-headedness and headaches.   Hematological: Negative.    Psychiatric/Behavioral:  Positive for hallucinations. Negative for suicidal ideas. The patient is not nervous/anxious.         MDD, with psychotic features       Objective   /60 (BP Location: Right arm, Patient Position: Sitting, Cuff Size:  "Standard)   Pulse 86   Temp 97.5 °F (36.4 °C) (Temporal)   Resp 14   Ht 5' 7\" (1.702 m)   Wt 79.3 kg (174 lb 12.8 oz)   SpO2 99%   BMI 27.38 kg/m²      Physical Exam  Constitutional:       General: He is not in acute distress.     Appearance: Normal appearance. He is not ill-appearing or toxic-appearing.      Comments: Very pleasant and cooperative   HENT:      Nose: No congestion.      Mouth/Throat:      Mouth: Mucous membranes are moist.   Eyes:      Extraocular Movements: Extraocular movements intact.   Cardiovascular:      Rate and Rhythm: Normal rate and regular rhythm.      Pulses: Normal pulses.      Heart sounds: Normal heart sounds. No murmur heard.  Pulmonary:      Effort: Pulmonary effort is normal.      Breath sounds: Normal breath sounds. No wheezing or rales.   Chest:      Chest wall: No tenderness.   Abdominal:      Palpations: Abdomen is soft.   Musculoskeletal:         General: Normal range of motion.      Cervical back: Normal range of motion.   Skin:     General: Skin is warm.   Neurological:      Mental Status: He is alert and oriented to person, place, and time. Mental status is at baseline.   Psychiatric:         Mood and Affect: Mood normal.         Behavior: Behavior normal.         Thought Content: Thought content normal.         "

## 2025-01-28 NOTE — ASSESSMENT & PLAN NOTE
"Refer to\"A/P\" severe depression  Orders:    venlafaxine (EFFEXOR-XR) 150 mg 24 hr capsule; Take 1 capsule (150 mg total) by mouth daily    ARIPiprazole (ABILIFY) 20 MG tablet; Take 1 tablet (20 mg total) by mouth daily    benztropine (COGENTIN) 0.5 mg tablet; Take 1 tablet (0.5 mg total) by mouth every 24 hours    "

## 2025-01-28 NOTE — ASSESSMENT & PLAN NOTE
Well controlled  130/60 today  Affirms compliance  Home med: lisinopril 40 mg QD    Plan  Cont home med  Orders:    lisinopril (ZESTRIL) 40 mg tablet; Take 1 tablet (40 mg total) by mouth daily

## 2025-01-28 NOTE — ASSESSMENT & PLAN NOTE
Likely due to running out of all psych medications for past 8 days  Reports hallucination that started about 4 days ago  Depressive symptoms, mildly present however not suicidal  Patient reports insurance gave him a physicians number to follow up  and set up appointments with (most probably a psychiatry- will follow up on next office visit)    Plan  Refill all psych medications  Have preceptor prescribe clonazepam 0.5 mg.  Orders:    venlafaxine (EFFEXOR-XR) 75 mg 24 hr capsule; Take 1 capsule (75 mg total) by mouth daily

## 2025-01-28 NOTE — ASSESSMENT & PLAN NOTE
Asympomatic  Home med: tamsulosin    Plan  Cont home med  Orders:    tamsulosin (FLOMAX) 0.4 mg; Take 1 capsule (0.4 mg total) by mouth daily with dinner

## 2025-02-06 RX ORDER — CLONAZEPAM 0.5 MG/1
0.5 TABLET ORAL DAILY
Qty: 30 TABLET | Refills: 1 | Status: SHIPPED | OUTPATIENT
Start: 2025-02-06

## 2025-03-29 DIAGNOSIS — R35.0 BENIGN PROSTATIC HYPERPLASIA WITH URINARY FREQUENCY: ICD-10-CM

## 2025-03-29 DIAGNOSIS — N40.1 BENIGN PROSTATIC HYPERPLASIA WITH URINARY FREQUENCY: ICD-10-CM

## 2025-03-31 RX ORDER — TAMSULOSIN HYDROCHLORIDE 0.4 MG/1
CAPSULE ORAL
Qty: 90 CAPSULE | Refills: 0 | Status: SHIPPED | OUTPATIENT
Start: 2025-03-31

## 2025-04-02 DIAGNOSIS — E78.5 HYPERLIPIDEMIA, UNSPECIFIED HYPERLIPIDEMIA TYPE: ICD-10-CM

## 2025-04-04 RX ORDER — ATORVASTATIN CALCIUM 20 MG/1
20 TABLET, FILM COATED ORAL DAILY
Qty: 90 TABLET | Refills: 1 | Status: SHIPPED | OUTPATIENT
Start: 2025-04-04

## 2025-04-07 ENCOUNTER — OFFICE VISIT (OUTPATIENT)
Dept: FAMILY MEDICINE CLINIC | Facility: CLINIC | Age: 70
End: 2025-04-07

## 2025-04-07 VITALS
RESPIRATION RATE: 19 BRPM | HEART RATE: 80 BPM | BODY MASS INDEX: 26.59 KG/M2 | DIASTOLIC BLOOD PRESSURE: 80 MMHG | OXYGEN SATURATION: 97 % | WEIGHT: 169.4 LBS | TEMPERATURE: 97.5 F | HEIGHT: 67 IN | SYSTOLIC BLOOD PRESSURE: 134 MMHG

## 2025-04-07 DIAGNOSIS — M54.2 NECK PAIN: Primary | ICD-10-CM

## 2025-04-07 DIAGNOSIS — F20.9 SCHIZOPHRENIA, UNSPECIFIED TYPE (HCC): ICD-10-CM

## 2025-04-07 DIAGNOSIS — R35.0 BENIGN PROSTATIC HYPERPLASIA WITH URINARY FREQUENCY: ICD-10-CM

## 2025-04-07 DIAGNOSIS — N40.1 BENIGN PROSTATIC HYPERPLASIA WITH URINARY FREQUENCY: ICD-10-CM

## 2025-04-07 DIAGNOSIS — Z76.0 MEDICATION REFILL: ICD-10-CM

## 2025-04-07 PROCEDURE — 99213 OFFICE O/P EST LOW 20 MIN: CPT | Performed by: FAMILY MEDICINE

## 2025-04-07 RX ORDER — FINASTERIDE 5 MG/1
5 TABLET, FILM COATED ORAL DAILY
Qty: 30 TABLET | Refills: 5 | Status: SHIPPED | OUTPATIENT
Start: 2025-04-07

## 2025-04-07 RX ORDER — BENZTROPINE MESYLATE 0.5 MG/1
0.5 TABLET ORAL EVERY 24 HOURS
Qty: 90 TABLET | Refills: 0 | Status: SHIPPED | OUTPATIENT
Start: 2025-04-07

## 2025-04-07 RX ORDER — FINASTERIDE 5 MG/1
5 TABLET, FILM COATED ORAL DAILY
Qty: 90 TABLET | OUTPATIENT
Start: 2025-04-07

## 2025-04-07 RX ORDER — ARIPIPRAZOLE 20 MG/1
20 TABLET ORAL DAILY
Qty: 90 TABLET | Refills: 0 | Status: SHIPPED | OUTPATIENT
Start: 2025-04-07

## 2025-04-07 NOTE — ASSESSMENT & PLAN NOTE
Well controlled on Abilify 20 mg daily. Patient denies hallucinations, SI/HI  Patient waiting to establish care with Psychiatry   Medications refilled   ED precautions discussed   Follow up with PCP in 4 weeks   Orders:    ARIPiprazole (ABILIFY) 20 MG tablet; Take 1 tablet (20 mg total) by mouth daily    benztropine (COGENTIN) 0.5 mg tablet; Take 1 tablet (0.5 mg total) by mouth every 24 hours

## 2025-04-07 NOTE — PROGRESS NOTES
Name: Saji Blackman      : 1955      MRN: [id number]  Encounter Provider: Demarcus Velásquez MD  Encounter Date: 2025   Encounter department: [facility]  :  Assessment & Plan  Schizophrenia, unspecified type (HCC)  Well controlled on Abilify 20 mg daily. Patient denies hallucinations, SI/HI  Patient waiting to establish care with Psychiatry   Medications refilled   ED precautions discussed   Follow up with PCP in 4 weeks   Orders:    ARIPiprazole (ABILIFY) 20 MG tablet; Take 1 tablet (20 mg total) by mouth daily    benztropine (COGENTIN) 0.5 mg tablet; Take 1 tablet (0.5 mg total) by mouth every 24 hours    Neck pain  Likely muscular. Strengthen and sensibility preserved. No signs of radiculopathy   Trial of exercise and Voltaren gel   Follow up with PCP as scheduled   Orders:    Diclofenac Sodium (VOLTAREN) 1 %; Apply 2 g topically 4 (four) times a day for 7 days    Medication refill    Orders:    ARIPiprazole (ABILIFY) 20 MG tablet; Take 1 tablet (20 mg total) by mouth daily    finasteride (PROSCAR) 5 mg tablet; Take 1 tablet (5 mg total) by mouth daily    benztropine (COGENTIN) 0.5 mg tablet; Take 1 tablet (0.5 mg total) by mouth every 24 hours           History of Present Illness   Saji is a 69 y.o male presenting today to request refill off his psych medications (venlafaxine 75 mg daily, aripiprazole 20 mg daily, and benztropine 0.5 mg daily).  He is also request refill for finasteride which he takes for BPH.  Patient reports that he is stable on his medications.  Denies side effects, suicidal or homicidal ideation.  Denies hallucinations.    Patient is also complaining of neck pain for the past 2 weeks.     Neck Pain   This is a new problem. The current episode started 1 to 4 weeks ago. The problem occurs constantly. The problem has been unchanged. The pain is associated with a sleep position. The pain is present in the occipital region. The quality of the  "pain is described as aching. The pain is at a severity of 4/10. The pain is mild. Nothing aggravates the symptoms. The pain is Same all the time. Pertinent negatives include no fever, headaches, numbness, trouble swallowing, visual change or weakness. He has tried nothing for the symptoms.     Review of Systems   Constitutional:  Negative for fever.   HENT:  Negative for trouble swallowing.    Musculoskeletal:  Positive for neck pain.   Neurological:  Negative for weakness, numbness and headaches.       Objective   /80 (BP Location: Left arm, Patient Position: Sitting, Cuff Size: Standard)   Pulse 80   Temp 97.5 °F (36.4 °C) (Temporal)   Resp 19   Ht 5' 7\" (1.702 m)   Wt 76.8 kg (169 lb 6.4 oz)   SpO2 97%   BMI 26.53 kg/m²      Physical Exam  Vitals and nursing note reviewed.   Constitutional:       General: He is not in acute distress.     Appearance: Normal appearance. He is well-developed. He is not ill-appearing.   HENT:      Head: Normocephalic and atraumatic.      Right Ear: Tympanic membrane and external ear normal. There is no impacted cerumen.      Left Ear: Tympanic membrane and external ear normal. There is no impacted cerumen.      Nose: Nose normal.      Mouth/Throat:      Mouth: Mucous membranes are moist.      Pharynx: Oropharynx is clear. No oropharyngeal exudate or posterior oropharyngeal erythema.   Eyes:      General: No scleral icterus.     Conjunctiva/sclera: Conjunctivae normal.   Neck:      Thyroid: No thyroid mass, thyromegaly or thyroid tenderness.   Cardiovascular:      Rate and Rhythm: Normal rate and regular rhythm.      Pulses: Normal pulses.      Heart sounds: Normal heart sounds. No murmur heard.  Pulmonary:      Effort: Pulmonary effort is normal. No respiratory distress.      Breath sounds: Normal breath sounds. No wheezing.   Musculoskeletal:      Cervical back: Normal range of motion and neck supple. No rigidity or crepitus. Muscular tenderness present. No pain with " movement or spinous process tenderness. Normal range of motion.   Lymphadenopathy:      Cervical: No cervical adenopathy.   Skin:     General: Skin is warm and dry.      Capillary Refill: Capillary refill takes less than 2 seconds.   Neurological:      General: No focal deficit present.      Mental Status: He is alert and oriented to person, place, and time.   Psychiatric:         Mood and Affect: Mood normal.         Behavior: Behavior normal.         Thought Content: Thought content normal.

## 2025-04-10 ENCOUNTER — TELEPHONE (OUTPATIENT)
Dept: FAMILY MEDICINE CLINIC | Facility: CLINIC | Age: 70
End: 2025-04-10

## 2025-04-10 DIAGNOSIS — F33.3 SEVERE EPISODE OF RECURRENT MAJOR DEPRESSIVE DISORDER, WITH PSYCHOTIC FEATURES (HCC): ICD-10-CM

## 2025-04-10 RX ORDER — VENLAFAXINE HYDROCHLORIDE 75 MG/1
75 CAPSULE, EXTENDED RELEASE ORAL DAILY
Qty: 90 CAPSULE | Refills: 0 | Status: SHIPPED | OUTPATIENT
Start: 2025-04-10

## 2025-04-10 NOTE — TELEPHONE ENCOUNTER
Pt came into office and is requesting refills for the following medications. Please advise.     venlafaxine (EFFEXOR-XR) 75 mg 24 hr capsule

## 2025-04-29 ENCOUNTER — OFFICE VISIT (OUTPATIENT)
Dept: FAMILY MEDICINE CLINIC | Facility: CLINIC | Age: 70
End: 2025-04-29

## 2025-04-29 VITALS
TEMPERATURE: 97.6 F | BODY MASS INDEX: 26.21 KG/M2 | OXYGEN SATURATION: 99 % | HEIGHT: 67 IN | SYSTOLIC BLOOD PRESSURE: 140 MMHG | HEART RATE: 93 BPM | DIASTOLIC BLOOD PRESSURE: 80 MMHG | RESPIRATION RATE: 16 BRPM | WEIGHT: 167 LBS

## 2025-04-29 DIAGNOSIS — M54.2 NECK PAIN: ICD-10-CM

## 2025-04-29 DIAGNOSIS — F20.9 SCHIZOPHRENIA, UNSPECIFIED TYPE (HCC): ICD-10-CM

## 2025-04-29 DIAGNOSIS — I10 BENIGN ESSENTIAL HYPERTENSION: Primary | ICD-10-CM

## 2025-04-29 DIAGNOSIS — G89.29 CHRONIC RIGHT-SIDED LOW BACK PAIN WITHOUT SCIATICA: ICD-10-CM

## 2025-04-29 DIAGNOSIS — H04.129 EYE DRYNESS: ICD-10-CM

## 2025-04-29 DIAGNOSIS — M54.50 CHRONIC RIGHT-SIDED LOW BACK PAIN WITHOUT SCIATICA: ICD-10-CM

## 2025-04-29 PROCEDURE — 99213 OFFICE O/P EST LOW 20 MIN: CPT

## 2025-04-29 RX ORDER — LISINOPRIL 40 MG/1
40 TABLET ORAL DAILY
Qty: 90 TABLET | Refills: 1 | Status: SHIPPED | OUTPATIENT
Start: 2025-04-29

## 2025-04-29 RX ORDER — CLONAZEPAM 0.5 MG/1
0.5 TABLET ORAL DAILY
Qty: 30 TABLET | Refills: 1 | Status: SHIPPED | OUTPATIENT
Start: 2025-04-29

## 2025-04-29 RX ORDER — ACETAMINOPHEN 500 MG
1000 TABLET ORAL EVERY 8 HOURS PRN
Qty: 30 TABLET | Refills: 0 | Status: SHIPPED | OUTPATIENT
Start: 2025-04-29

## 2025-04-29 RX ORDER — POLYVINYL ALCOHOL 14 MG/ML
1 SOLUTION/ DROPS OPHTHALMIC AS NEEDED
Qty: 15 ML | Refills: 0 | Status: SHIPPED | OUTPATIENT
Start: 2025-04-29

## 2025-04-29 NOTE — ASSESSMENT & PLAN NOTE
-Well controlled, asymptomatic  -140/80 today  -Requesting refills for lisinopril     Plan  -Refill home meds  -To bring along all medications for next office visit for complete reconciliation and proper review  Orders:    lisinopril (ZESTRIL) 40 mg tablet; Take 1 tablet (40 mg total) by mouth daily    Ambulatory referral to chronic care management; Future

## 2025-04-29 NOTE — PROGRESS NOTES
Name: Saji Blackman      : 1955      MRN: [id number]  Encounter Provider: Rosetta Scott MD  Encounter Date: 2025   Encounter department: [facility]  :  Assessment & Plan  Benign essential hypertension  -Well controlled, asymptomatic  -140/80 today  -Requesting refills for lisinopril     Plan  -Refill home meds  -To bring along all medications for next office visit for complete reconciliation and proper review  Orders:    lisinopril (ZESTRIL) 40 mg tablet; Take 1 tablet (40 mg total) by mouth daily    Ambulatory referral to chronic care management; Future    Neck pain  -Improves with tylenol   -However requesting re-prescription of voltaren gel    Plan  -Reordered voltaren gel  -Continue taking Tylenol   -To reassess pain in 2 weeks  -To bring along all medications for next office visit for complete reconciliation and proper review  Orders:    Diclofenac Sodium (VOLTAREN) 1 %; Apply 2 g topically 4 (four) times a day for 7 days    acetaminophen (TYLENOL) 500 mg tablet; Take 2 tablets (1,000 mg total) by mouth every 8 (eight) hours as needed for mild pain    Eye dryness  -Denies any known allergies  -Requesting eye drops    Plan  -Ordered eye drops  Orders:    Artificial Tears 1.4 % ophthalmic solution; Administer 1 drop to both eyes as needed for dry eyes    Schizophrenia, unspecified type (HCC)  -Requesting refill for klonopin  -PHQ-9 of 9 today, reports SI at times for the past month, however no intent because of his children  -Affirms safe to go home, and will not attempt suicide  -Friends live at his place for now, not alone at home    Plan  -Ordered klonopin  -To follow up in 2 weeks   -To bring along all medications for next office visit for complete reconciliation and proper review  Orders:    clonazePAM (KlonoPIN) 0.5 mg tablet; Take 1 tablet (0.5 mg total) by mouth daily    Chronic right-sided low back pain without sciatica  -Recurrence of pain for past 2-3  weeks  -No red flag symptoms    Plan  -Tylenol 1g Q8H PRN  -To follow up in 2 weeks to reassess  -To bring along all medications for next office visit for complete reconciliation and proper review                 Depression Screening and Follow-up Plan: Patient's depression screening was positive with a PHQ-9 score of 9.         History of Present Illness   Saji is a 68 y/o male with PMH of severe MDD (with psychotic features), well controlled asthma, HTN and HLD presenting today requesting refills for his clonazepam, lisinopril and eye drops. He reports not receiving it from the pharmacy for the past 4 months and states it usually helps him sleep better.    With regards to his right sided neck pain from his last visit, he reports it has been about 2 months, currently worsening with a pain score of 9/10. Also was not given his voltaren gel prescribed through the pharmacy. His neck pain is associated with light headedness at times when he lowers his head. However he denies any sensation of room spinning, vision affected, headache, nausea, ringing sensation in his ear, weakness, numbness or unexpected weight loss recently. Therefore, he is requesting the voltaren gel to be resent to the pharmacy for him to  today.      He also reports recurrence of right lower back pain for the past 2-3 weeks. He denies any associated weakness, numbness, difficulties controlling urine or stool. He takes tylenol at home for it that truly helps with pain. He denies any prior trauma or sleeping on his right side more at night. Notes it was a chronic issue in the past. Requesting refills for tylenol.         Review of Systems   Constitutional:  Negative for activity change, appetite change and unexpected weight change.   HENT:  Negative for congestion, ear discharge, ear pain, rhinorrhea and tinnitus.    Eyes:  Positive for itching. Negative for pain, discharge and visual disturbance.        Bilateral eye dryness   Respiratory:  " Negative for cough and shortness of breath.    Cardiovascular:  Negative for chest pain and palpitations.   Gastrointestinal:  Negative for abdominal pain, blood in stool, constipation, diarrhea, nausea and vomiting.   Genitourinary:  Negative for difficulty urinating, dysuria and hematuria.   Musculoskeletal:  Positive for back pain and neck pain.   Allergic/Immunologic: Negative for environmental allergies and food allergies.   Neurological:  Positive for light-headedness. Negative for syncope, weakness, numbness and headaches.   Psychiatric/Behavioral:  Positive for hallucinations (audio). Negative for suicidal ideas. The patient is not nervous/anxious.        Objective   /80 (BP Location: Left arm, Patient Position: Sitting, Cuff Size: Standard)   Pulse 93   Temp 97.6 °F (36.4 °C) (Temporal)   Resp 16   Ht 5' 7\" (1.702 m)   Wt 75.8 kg (167 lb)   SpO2 99%   BMI 26.16 kg/m²      Physical Exam  Vitals and nursing note reviewed.   Constitutional:       General: He is not in acute distress.     Appearance: Normal appearance. He is well-developed.   HENT:      Mouth/Throat:      Mouth: Mucous membranes are moist.   Eyes:      Extraocular Movements: Extraocular movements intact.      Conjunctiva/sclera: Conjunctivae normal.   Neck:      Thyroid: No thyroid mass.      Trachea: Trachea normal.   Cardiovascular:      Rate and Rhythm: Normal rate and regular rhythm.      Pulses: Normal pulses.      Heart sounds: Normal heart sounds. No murmur heard.  Pulmonary:      Effort: Pulmonary effort is normal. No respiratory distress.      Breath sounds: Normal breath sounds.   Musculoskeletal:         General: Tenderness present. No swelling. Normal range of motion.      Cervical back: Normal range of motion. No edema, erythema, signs of trauma, rigidity or crepitus. Muscular tenderness (right sided, darren on turning head to right) present.      Thoracic back: Normal.      Lumbar back: No swelling, edema, deformity, " signs of trauma or bony tenderness. Tenderness: right lower paraspinal pain, darren on left lateral and forward bend..Normal range of motion. Negative right straight leg raise test and negative left straight leg raise test. No scoliosis.   Lymphadenopathy:      Cervical: No cervical adenopathy.   Skin:     General: Skin is warm.   Neurological:      Mental Status: He is alert and oriented to person, place, and time. Mental status is at baseline.   Psychiatric:         Mood and Affect: Mood normal.         Behavior: Behavior normal.         Thought Content: Thought content normal.

## 2025-04-30 ENCOUNTER — PATIENT OUTREACH (OUTPATIENT)
Dept: FAMILY MEDICINE CLINIC | Facility: CLINIC | Age: 70
End: 2025-04-30

## 2025-04-30 PROBLEM — M54.2 NECK PAIN: Status: ACTIVE | Noted: 2025-04-30

## 2025-04-30 NOTE — PROGRESS NOTES
Patient identified for CCM billing.    I called the patient using [employer] but his voicemail is not set up.  I will continue further outreach.    Chart reviewed.

## 2025-04-30 NOTE — ASSESSMENT & PLAN NOTE
-Improves with tylenol   -However requesting re-prescription of voltaren gel    Plan  -Reordered voltaren gel  -Continue taking Tylenol   -To reassess pain in 2 weeks  -To bring along all medications for next office visit for complete reconciliation and proper review  Orders:    Diclofenac Sodium (VOLTAREN) 1 %; Apply 2 g topically 4 (four) times a day for 7 days    acetaminophen (TYLENOL) 500 mg tablet; Take 2 tablets (1,000 mg total) by mouth every 8 (eight) hours as needed for mild pain

## 2025-04-30 NOTE — ASSESSMENT & PLAN NOTE
-Recurrence of pain for past 2-3 weeks  -No red flag symptoms    Plan  -Tylenol 1g Q8H PRN  -To follow up in 2 weeks to reassess  -To bring along all medications for next office visit for complete reconciliation and proper review

## 2025-05-01 NOTE — ASSESSMENT & PLAN NOTE
-Requesting refill for klonopin  -PHQ-9 of 9 today, reports SI at times for the past month, however no intent because of his children  -Affirms safe to go home, and will not attempt suicide  -Friends live at his place for now, not alone at home    Plan  -Ordered klonopin  -To follow up in 2 weeks   -To bring along all medications for next office visit for complete reconciliation and proper review  Orders:    clonazePAM (KlonoPIN) 0.5 mg tablet; Take 1 tablet (0.5 mg total) by mouth daily

## 2025-05-05 ENCOUNTER — PATIENT OUTREACH (OUTPATIENT)
Dept: FAMILY MEDICINE CLINIC | Facility: CLINIC | Age: 70
End: 2025-05-05

## 2025-05-05 NOTE — PROGRESS NOTES
Chronic Care Management Program Consent:    Patient informed of availability of Chronic Care Management services. The services will billed monthly by their Primary Care Provider only. Patient is informed there may be a monthly cost sharing associated with the Chronic Care Management services. Patient is aware that financial counseling is available to assist with any co-pay questions or concerns.    Chronic Care Management services include:    24/7 access to care.  Comprehensive plan of care created by the provider.  Individualized care planning by the care manager(s).  Transitional care support.    The patient is informed that they have the right to stop Chronic Care Management services at anytime.       Patient consents to Chronic Care Management services? yes      Patient informed that consent is needed only once unless the patient switches qualifying providers.      I called the patient using [employer], introduced my role and explained the CCM program which he accepts.  The patient could not talk at this time and asked that I call back.

## 2025-05-07 ENCOUNTER — RA CDI HCC (OUTPATIENT)
Dept: OTHER | Facility: HOSPITAL | Age: 70
End: 2025-05-07

## 2025-05-07 ENCOUNTER — PATIENT OUTREACH (OUTPATIENT)
Dept: FAMILY MEDICINE CLINIC | Facility: CLINIC | Age: 70
End: 2025-05-07

## 2025-05-07 DIAGNOSIS — I10 BENIGN ESSENTIAL HYPERTENSION: Primary | ICD-10-CM

## 2025-05-07 DIAGNOSIS — E78.5 HYPERLIPIDEMIA, UNSPECIFIED HYPERLIPIDEMIA TYPE: ICD-10-CM

## 2025-05-07 NOTE — PROGRESS NOTES
I called the patient using [email or web address] but he does not have a voicemail set up.  I will continue further outreach.

## 2025-05-12 ENCOUNTER — PATIENT OUTREACH (OUTPATIENT)
Dept: FAMILY MEDICINE CLINIC | Facility: CLINIC | Age: 70
End: 2025-05-12

## 2025-05-12 DIAGNOSIS — I10 BENIGN ESSENTIAL HYPERTENSION: Primary | ICD-10-CM

## 2025-05-12 DIAGNOSIS — J45.40 MODERATE PERSISTENT ASTHMA WITHOUT COMPLICATION: ICD-10-CM

## 2025-05-12 NOTE — LETTER
May 12, 2025    Saji Blackman  [address] [facility] [address]    Dear Mr. Blackman,    We would like to invite you to participate in our Chronic Care Management program with the goal of improving your health. We will match you up with a care manager who will work with you to keep your chronic conditions under control. Please call your clinic or log on to [facility] if you have any questions or would like to enroll.    We look forward to working toward a healthier you together.         Sincerely,      Jaycee HILL Care Manager  [phone]

## 2025-05-12 NOTE — PROGRESS NOTES
I called the patient using [employer] but his voicemail is not set up.  I am sending the UTR letter and await response.

## 2025-05-13 ENCOUNTER — TELEPHONE (OUTPATIENT)
Age: 70
End: 2025-05-13

## 2025-05-13 NOTE — TELEPHONE ENCOUNTER
Pt calling in, spoke with pt through . Pt reports missing call from office 1 min ago- I do not see any notes in chart. I provided Dr. Moreno phone number.

## 2025-05-16 ENCOUNTER — ANESTHESIA EVENT (OUTPATIENT)
Dept: GASTROENTEROLOGY | Facility: HOSPITAL | Age: 70
End: 2025-05-16
Payer: COMMERCIAL

## 2025-05-16 ENCOUNTER — ANESTHESIA (OUTPATIENT)
Dept: GASTROENTEROLOGY | Facility: HOSPITAL | Age: 70
End: 2025-05-16
Payer: COMMERCIAL

## 2025-05-16 ENCOUNTER — HOSPITAL ENCOUNTER (OUTPATIENT)
Dept: GASTROENTEROLOGY | Facility: HOSPITAL | Age: 70
Setting detail: OUTPATIENT SURGERY
End: 2025-05-16
Attending: COLON & RECTAL SURGERY
Payer: COMMERCIAL

## 2025-05-16 VITALS
SYSTOLIC BLOOD PRESSURE: 111 MMHG | HEIGHT: 68 IN | RESPIRATION RATE: 16 BRPM | TEMPERATURE: 97.4 F | HEART RATE: 75 BPM | BODY MASS INDEX: 26.83 KG/M2 | WEIGHT: 177 LBS | DIASTOLIC BLOOD PRESSURE: 72 MMHG | OXYGEN SATURATION: 97 %

## 2025-05-16 DIAGNOSIS — Z12.11 ENCOUNTER FOR SCREENING FOR MALIGNANT NEOPLASM OF COLON: ICD-10-CM

## 2025-05-16 DIAGNOSIS — Z85.038 PERSONAL HISTORY OF OTHER MALIGNANT NEOPLASM OF LARGE INTESTINE: ICD-10-CM

## 2025-05-16 PROCEDURE — 88305 TISSUE EXAM BY PATHOLOGIST: CPT | Performed by: PATHOLOGY

## 2025-05-16 RX ORDER — SODIUM CHLORIDE, SODIUM LACTATE, POTASSIUM CHLORIDE, CALCIUM CHLORIDE 600; 310; 30; 20 MG/100ML; MG/100ML; MG/100ML; MG/100ML
INJECTION, SOLUTION INTRAVENOUS CONTINUOUS PRN
Status: DISCONTINUED | OUTPATIENT
Start: 2025-05-16 | End: 2025-05-16

## 2025-05-16 RX ORDER — PROPOFOL 10 MG/ML
INJECTION, EMULSION INTRAVENOUS AS NEEDED
Status: DISCONTINUED | OUTPATIENT
Start: 2025-05-16 | End: 2025-05-16

## 2025-05-16 RX ADMIN — PROPOFOL 30 MG: 10 INJECTION, EMULSION INTRAVENOUS at 10:04

## 2025-05-16 RX ADMIN — SODIUM CHLORIDE, SODIUM LACTATE, POTASSIUM CHLORIDE, AND CALCIUM CHLORIDE: .6; .31; .03; .02 INJECTION, SOLUTION INTRAVENOUS at 08:38

## 2025-05-16 RX ADMIN — PROPOFOL 50 MG: 10 INJECTION, EMULSION INTRAVENOUS at 09:54

## 2025-05-16 RX ADMIN — PROPOFOL 100 MG: 10 INJECTION, EMULSION INTRAVENOUS at 09:51

## 2025-05-16 RX ADMIN — PROPOFOL 50 MG: 10 INJECTION, EMULSION INTRAVENOUS at 09:57

## 2025-05-16 NOTE — DISCHARGE INSTR - AVS FIRST PAGE
"[facility]  [place] [address]  [place] [address]  Phone: [phone]    DISCHARGE INSTRUCTIONS:    1.  ___ Complete Exam - Normal    2.  ___ Exam normal, but entire colon not seen. We will discuss this with you.    3.  ___ Polyp(s) removed by \"burning\" - NO pathology report will follow    4.  _2__ Polyp(s) removed by excision. Pathology report will be available in 4-5 days   Someone from our office will call you with results.    5.  ___ Exam prompted biopsies. Pathology report will be available in 4-5 days   Someone from our office will call you with results.    6.  ___ Exam demonstrated findings that need treatment. Prescriptions will be   Given to you. Return to our office in ____ weeks. Please call for appt.    7.  ___ Original office visit or colonoscopy findings necessitate an office visit.   Please call to set up a new appointment    8.  ___ Medication  __________________________________________        WHEN LEAVING THE HOSPITAL:    - Go straight home and rest today    - No driving or drinking alcohol for 24 hours    - Resume regular diet and medications unless otherwise instructed. Coumadin and Plavix are blood thinners. You can resume these medications on __________.    IF YOU ARE HAVING ANY FEVER, BLEEDING OR PERSISTENT PAIN IN THE ABDOMEN, PLEASE CALL OUR OFFICE ANY DAY OR TIME  [phone]  "

## 2025-05-16 NOTE — ANESTHESIA PREPROCEDURE EVALUATION
Procedure:  COLONOSCOPY    Relevant Problems   ANESTHESIA (within normal limits)      CARDIO   (+) Benign essential hypertension   (+) Mixed hyperlipidemia   (-) Angina at rest (HCC)   (-) Angina of effort (HCC)   (-) ALBRECHT (dyspnea on exertion)      ENDO (within normal limits)      GI/HEPATIC   (+) GERD (gastroesophageal reflux disease) (Well controlled)   (+) 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HCC)      /RENAL   (+) Chronic kidney disease (CKD), stage II (mild)      HEMATOLOGY   (+) Iron deficiency anemia   (+) Other dietary vitamin B12 deficiency anemia      MUSCULOSKELETAL   (+) Chronic right-sided low back pain without sciatica      NEURO/PSYCH   (+) Chronic right-sided low back pain without sciatica   (+) Schizophrenia (HCC)   (+) Severe episode of recurrent major depressive disorder, with psychotic features (HCC)      PULMONARY   (+) Moderate persistent asthma without complication   (+) Uncomplicated asthma   (-) URI (upper respiratory infection)        Physical Exam    Airway     Mallampati score: IV  TM Distance: >3 FB  Neck ROM: full      Cardiovascular  Rhythm: regular, Rate: normal    Dental   No notable dental hx     Pulmonary   Breath sounds clear to auscultation    Neurological    He appears awake, alert and oriented x3.      Other Findings        Anesthesia Plan  ASA Score- 3     Anesthesia Type- sedation with ASA Monitors.         Additional Monitors:     Airway Plan: natural airway.           Plan Factors-Exercise tolerance (METS): >4 METS.    Chart reviewed.        Patient is not a current smoker.      Obstructive sleep apnea risk education given perioperatively.        Induction- intravenous.    Postoperative Plan- .   Monitoring Plan - Monitoring plan - standard ASA monitoring      Perioperative Resuscitation Plan - Level 1 - Full Code.       Informed Consent- Anesthetic plan and risks discussed with patient.  I personally reviewed this patient with the CRNA. Discussed and agreed on the Anesthesia  Plan with the CRNA..      NPO Status:  Vitals Value Taken Time   Date of last liquid 05/15/25 05/16/25 08:49   Time of last liquid 2300 05/16/25 08:49   Date of last solid 05/15/25 05/16/25 08:49   Time of last solid 1500 05/16/25 08:49

## 2025-05-16 NOTE — ANESTHESIA POSTPROCEDURE EVALUATION
Post-Op Assessment Note    CV Status:  Stable  Pain Score: 0    Pain management: adequate       Mental Status:  Alert   Hydration Status:  Stable   PONV Controlled:  Controlled   Airway Patency:  Patent     Post Op Vitals Reviewed: Yes    No anethesia notable event occurred.    Staff: CRNA           Last Filed PACU Vitals:  Vitals Value Taken Time   Temp 97.4 °F (36.3 °C) 05/16/25 10:10   Pulse 83 05/16/25 10:10   BP 99/63 05/16/25 10:10   Resp 16 05/16/25 10:10   SpO2 97 % 05/16/25 10:10       Modified Juan:     Vitals Value Taken Time   Activity 2 05/16/25 10:12   Respiration 2 05/16/25 10:12   Circulation 1 05/16/25 10:12   Consciousness 1 05/16/25 10:12   Oxygen Saturation 2 05/16/25 10:12     Modified Jaun Score: 8

## 2025-05-16 NOTE — INTERVAL H&P NOTE
H&P reviewed. After examining the patient I find no changes in the patients condition since the H&P had been written.    Vitals:    05/16/25 0855   BP: 158/86   Pulse: 80   Resp: 16   Temp: (!) 97.4 °F (36.3 °C)   SpO2: 99%

## 2025-05-19 ENCOUNTER — TELEPHONE (OUTPATIENT)
Age: 70
End: 2025-05-19

## 2025-05-19 NOTE — TELEPHONE ENCOUNTER
Pt calling in saying he missed a call from us. Unable to find documentation in chart of who would've called. Informed pt that when the tissue results from his recent colonoscopy come back, we will call him to review. Pt verbalized understanding. No further needs at this time.

## 2025-05-20 ENCOUNTER — OFFICE VISIT (OUTPATIENT)
Dept: FAMILY MEDICINE CLINIC | Facility: CLINIC | Age: 70
End: 2025-05-20

## 2025-05-20 VITALS
RESPIRATION RATE: 16 BRPM | HEIGHT: 68 IN | SYSTOLIC BLOOD PRESSURE: 118 MMHG | DIASTOLIC BLOOD PRESSURE: 70 MMHG | TEMPERATURE: 98 F | OXYGEN SATURATION: 99 % | WEIGHT: 170 LBS | HEART RATE: 70 BPM | BODY MASS INDEX: 25.76 KG/M2

## 2025-05-20 DIAGNOSIS — E78.5 HYPERLIPIDEMIA, UNSPECIFIED HYPERLIPIDEMIA TYPE: ICD-10-CM

## 2025-05-20 DIAGNOSIS — J45.40 MODERATE PERSISTENT ASTHMA WITHOUT COMPLICATION: ICD-10-CM

## 2025-05-20 DIAGNOSIS — F20.9 SCHIZOPHRENIA, UNSPECIFIED TYPE (HCC): ICD-10-CM

## 2025-05-20 DIAGNOSIS — Z79.899 ENCOUNTER FOR MEDICATION REVIEW: Primary | ICD-10-CM

## 2025-05-20 DIAGNOSIS — M54.2 NECK PAIN: ICD-10-CM

## 2025-05-20 DIAGNOSIS — H04.129 EYE DRYNESS: ICD-10-CM

## 2025-05-20 PROBLEM — Z00.00 ANNUAL PHYSICAL EXAM: Status: ACTIVE | Noted: 2025-05-20

## 2025-05-20 RX ORDER — ARIPIPRAZOLE 20 MG/1
20 TABLET ORAL DAILY
Qty: 90 TABLET | Refills: 0 | Status: SHIPPED | OUTPATIENT
Start: 2025-05-20

## 2025-05-20 RX ORDER — DILTIAZEM HYDROCHLORIDE 60 MG/1
2 TABLET, FILM COATED ORAL 2 TIMES DAILY
Qty: 10.2 G | Refills: 2 | Status: SHIPPED | OUTPATIENT
Start: 2025-05-20

## 2025-05-20 RX ORDER — BENZTROPINE MESYLATE 0.5 MG/1
0.5 TABLET ORAL EVERY 24 HOURS
Qty: 90 TABLET | Refills: 0 | Status: SHIPPED | OUTPATIENT
Start: 2025-05-20

## 2025-05-20 RX ORDER — CLONAZEPAM 0.5 MG/1
0.5 TABLET ORAL DAILY
Qty: 30 TABLET | Refills: 1 | Status: SHIPPED | OUTPATIENT
Start: 2025-05-20

## 2025-05-20 RX ORDER — ACETAMINOPHEN 500 MG
1000 TABLET ORAL EVERY 8 HOURS PRN
Qty: 30 TABLET | Refills: 0 | Status: SHIPPED | OUTPATIENT
Start: 2025-05-20

## 2025-05-20 RX ORDER — ATORVASTATIN CALCIUM 20 MG/1
40 TABLET, FILM COATED ORAL DAILY
Qty: 90 TABLET | Refills: 0 | Status: SHIPPED | OUTPATIENT
Start: 2025-05-20

## 2025-05-20 RX ORDER — ATORVASTATIN CALCIUM 20 MG/1
20 TABLET, FILM COATED ORAL DAILY
Qty: 90 TABLET | Refills: 0 | Status: SHIPPED | OUTPATIENT
Start: 2025-05-20 | End: 2025-05-20

## 2025-05-20 RX ORDER — FINASTERIDE 5 MG/1
5 TABLET, FILM COATED ORAL DAILY
Qty: 90 TABLET | Refills: 0 | Status: SHIPPED | OUTPATIENT
Start: 2025-05-20

## 2025-05-20 RX ORDER — ALBUTEROL SULFATE 90 UG/1
2 INHALANT RESPIRATORY (INHALATION) EVERY 6 HOURS PRN
Qty: 8.5 G | Refills: 3 | Status: SHIPPED | OUTPATIENT
Start: 2025-05-20

## 2025-05-20 RX ORDER — POLYVINYL ALCOHOL 14 MG/ML
1 SOLUTION/ DROPS OPHTHALMIC AS NEEDED
Qty: 15 ML | Refills: 0 | Status: SHIPPED | OUTPATIENT
Start: 2025-05-20

## 2025-05-20 NOTE — PROGRESS NOTES
Name: Saji Blackman      : 1955      MRN: [id number]  Encounter Provider: Rosetta Scott MD  Encounter Date: 2025   Encounter department: [facility]  :  Assessment & Plan  Encounter for medication review  Patient forgot to bring HTN and antidepressants meds today  Requesting refills    Plan  Refill medication bottles brought in that were empty  To bring ALL medications on next visit  Orders:    finasteride (PROSCAR) 5 mg tablet; Take 1 tablet (5 mg total) by mouth daily    Schizophrenia, unspecified type (HCC)  Symptoms well controlled  Denies any active hallucinations at moment  Home meds: apriprazole, bentztropine, clonazepam    Plan  Refill home meds  Orders:    ARIPiprazole (ABILIFY) 20 MG tablet; Take 1 tablet (20 mg total) by mouth daily    benztropine (COGENTIN) 0.5 mg tablet; Take 1 tablet (0.5 mg total) by mouth every 24 hours    clonazePAM (KlonoPIN) 0.5 mg tablet; Take 1 tablet (0.5 mg total) by mouth daily    Neck pain  Chronic neck pain   Previously resolved with tylenol and motrin  Last took meds over month ago    Plan  Refill tylenol  Orders:    acetaminophen (TYLENOL) 500 mg tablet; Take 2 tablets (1,000 mg total) by mouth every 8 (eight) hours as needed for mild pain    Moderate persistent asthma without complication  Mild symptoms present  Unknown timeline on when he last used his inhaler  Home meds: albuterol & symbicort    Plan:  Refill home meds  Orders:    albuterol (PROVENTIL HFA,VENTOLIN HFA) 90 mcg/act inhaler; Inhale 2 puffs every 6 (six) hours as needed for wheezing    Symbicort 80-4.5 MCG/ACT inhaler; Inhale 2 puffs 2 (two) times a day Rinse mouth after use    Hyperlipidemia, unspecified hyperlipidemia type  Lasl  (2022)  ASCVD 18.4%  Home med: Lipitor 20 mg QD    Plan  Increase dose 40 mg atorvastatin  Lipid panel  Orders:    atorvastatin (LIPITOR) 20 mg tablet; Take 2 tablets (40 mg total) by mouth daily    Lipid panel;  "Future    Eye dryness  Symptomatic at times  No known past allergies  No recent exposure to new pets, carpets, environment    Plan  Refill home meds  Orders:    Artificial Tears 1.4 % ophthalmic solution; Administer 1 drop to both eyes as needed for dry eyes       History of Present Illness   Saji is a 70 y/o male with PMH of schizophrenia, well controlled asthma, HTN and HLD presenting today for complete review of his medications since his last office visit. He was requesting refills, however was unsure of dosages at home and meds, so he was told to bring in all his meds for this visit.     On his last office visit he had refills for his clonazepam, lisinopril and eye drops. Today he had no active medical concerns, except that he's been having neck pain that was previously resolved with motrin and tylenol.      Review of Systems   Constitutional:  Negative for chills and fever.   HENT:  Negative for congestion.    Eyes:  Negative for visual disturbance.   Respiratory:  Negative for cough and shortness of breath.    Cardiovascular:  Negative for chest pain and palpitations.   Gastrointestinal:  Negative for abdominal pain, constipation, diarrhea, nausea and vomiting.   Genitourinary:  Negative for dysuria and hematuria.   Musculoskeletal:  Negative for arthralgias.   Neurological:  Negative for light-headedness and headaches.   All other systems reviewed and are negative.      Objective   /70 (BP Location: Right arm, Patient Position: Sitting, Cuff Size: Standard)   Pulse 70   Temp 98 °F (36.7 °C) (Temporal)   Resp 16   Ht 5' 8\" (1.727 m)   Wt 77.1 kg (170 lb)   SpO2 99%   BMI 25.85 kg/m²      Physical Exam  Vitals reviewed.   Constitutional:       General: He is not in acute distress.     Appearance: Normal appearance. He is well-developed.   HENT:      Mouth/Throat:      Mouth: Mucous membranes are moist.     Eyes:      Extraocular Movements: Extraocular movements intact.      Conjunctiva/sclera: " Conjunctivae normal.     Neck:      Comments: Very mild back of neck tenderness   Cardiovascular:      Rate and Rhythm: Normal rate and regular rhythm.      Pulses: Normal pulses.      Heart sounds: Normal heart sounds. No murmur heard.  Pulmonary:      Effort: Pulmonary effort is normal. No respiratory distress.      Breath sounds: Normal breath sounds.     Musculoskeletal:         General: No swelling. Normal range of motion.      Cervical back: Normal range of motion.     Skin:     General: Skin is warm.     Neurological:      Mental Status: He is alert and oriented to person, place, and time. Mental status is at baseline.     Psychiatric:         Mood and Affect: Mood normal.         Behavior: Behavior normal.         Thought Content: Thought content normal.

## 2025-05-20 NOTE — ASSESSMENT & PLAN NOTE
Mild symptoms present  Unknown timeline on when he last used his inhaler  Home meds: albuterol & symbicort    Plan:  Refill home meds  Orders:    albuterol (PROVENTIL HFA,VENTOLIN HFA) 90 mcg/act inhaler; Inhale 2 puffs every 6 (six) hours as needed for wheezing    Symbicort 80-4.5 MCG/ACT inhaler; Inhale 2 puffs 2 (two) times a day Rinse mouth after use

## 2025-05-20 NOTE — ASSESSMENT & PLAN NOTE
Chronic neck pain   Previously resolved with tylenol and motrin  Last took meds over month ago    Plan  Refill tylenol  Orders:    acetaminophen (TYLENOL) 500 mg tablet; Take 2 tablets (1,000 mg total) by mouth every 8 (eight) hours as needed for mild pain

## 2025-05-20 NOTE — ASSESSMENT & PLAN NOTE
Symptoms well controlled  Denies any active hallucinations at moment  Home meds: apriprazole, bentztropine, clonazepam    Plan  Refill home meds  Orders:    ARIPiprazole (ABILIFY) 20 MG tablet; Take 1 tablet (20 mg total) by mouth daily    benztropine (COGENTIN) 0.5 mg tablet; Take 1 tablet (0.5 mg total) by mouth every 24 hours    clonazePAM (KlonoPIN) 0.5 mg tablet; Take 1 tablet (0.5 mg total) by mouth daily

## 2025-05-22 PROCEDURE — 88305 TISSUE EXAM BY PATHOLOGIST: CPT | Performed by: PATHOLOGY

## 2025-05-30 ENCOUNTER — PATIENT OUTREACH (OUTPATIENT)
Dept: FAMILY MEDICINE CLINIC | Facility: CLINIC | Age: 70
End: 2025-05-30

## 2025-05-30 DIAGNOSIS — J45.40 MODERATE PERSISTENT ASTHMA WITHOUT COMPLICATION: ICD-10-CM

## 2025-05-30 DIAGNOSIS — I10 BENIGN ESSENTIAL HYPERTENSION: Primary | ICD-10-CM

## 2025-07-06 NOTE — PROGRESS NOTES
Name: Saji Blackman      : 1955      MRN: [id number]  Encounter Provider: Rosetta Scott MD  Encounter Date: 2025   Encounter department: [facility] [place]  :  Assessment & Plan  Encounter for medication review  Patient did not bring all his medications for visit  HTN, HLD, BPH refills requested     Plan  Requested again to bring all home meds on subsequent visit  Refilled lisinopril, lipitor, flomax,tamsulosin and finasteride  Follow up in 3 weeks  Orders:    finasteride (PROSCAR) 5 mg tablet; Take 1 tablet (5 mg total) by mouth daily    lisinopril (ZESTRIL) 40 mg tablet; Take 1 tablet (40 mg total) by mouth daily    tamsulosin (FLOMAX) 0.4 mg; Take 1 capsule (0.4 mg total) by mouth daily with dinner    atorvastatin (LIPITOR) 20 mg tablet; Take 2 tablets (40 mg total) by mouth daily    Benign essential hypertension  Elvated today 148/88 mmHg, on manual repeat 145/98 mmHg  He endorses at times forgetting to take ho me meds  Requesting refills  Last CMP wnl (1/3/24)  No evidence of any complications of HTN noted  Home meds:lisinopril    Plan  Cont home meds  Follow up in 3 weeks  Alb/cr urine ratio  BMP  Orders:    lisinopril (ZESTRIL) 40 mg tablet; Take 1 tablet (40 mg total) by mouth daily    Basic metabolic panel; Future    Albumin / creatinine urine ratio; Future    Benign prostatic hyperplasia with urinary frequency  Asymptomatic  Requesting refills    Plan  Tamsulosin 0.4 mg qd with dinner  Orders:    tamsulosin (FLOMAX) 0.4 mg; Take 1 capsule (0.4 mg total) by mouth daily with dinner    Hyperlipidemia, unspecified hyperlipidemia type  Last cholestrol 214 (2022)  Last  (2022)    Plan  Refill lipitor 20 mg qd  Orders:    atorvastatin (LIPITOR) 20 mg tablet; Take 2 tablets (40 mg total) by mouth daily           History of Present Illness   Saji is a 68 y/o male with PMH of schizophrenia, well controlled mild intermittent asthma, HTN and HLD presenting  "today for complete review of his medications since his last office visit and general follow up for his chronic medical conditions. He has no active medical concerns, otherwise is requesting refills for his HTN, HLD and BPH.    Of note, he reports being called in by pharmacy to  one of his meds. Possibly klonipin since he didn't receive it after his last visit he had to  his meds.          Review of Systems   Constitutional:  Negative for chills and fever.   HENT:  Negative for congestion and sore throat.    Eyes:  Negative for visual disturbance.   Respiratory:  Negative for cough and shortness of breath.    Cardiovascular:  Negative for chest pain and palpitations.   Gastrointestinal:  Negative for abdominal pain, constipation and diarrhea.   Genitourinary:  Negative for dysuria and hematuria.   Musculoskeletal:  Negative for arthralgias, back pain and neck pain.   Skin:  Negative for color change and rash.   Neurological:  Negative for dizziness, light-headedness and headaches.   Psychiatric/Behavioral:  Negative for agitation, confusion, hallucinations, sleep disturbance and suicidal ideas.    All other systems reviewed and are negative.      Objective   /88 (BP Location: Left arm, Patient Position: Sitting, Cuff Size: Standard)   Pulse 72   Temp (!) 97 °F (36.1 °C) (Temporal)   Resp 16   Ht 5' 8\" (1.727 m)   Wt 76.2 kg (168 lb)   SpO2 98%   BMI 25.54 kg/m²      Physical Exam  Vitals and nursing note reviewed.   Constitutional:       Appearance: Normal appearance.     Cardiovascular:      Rate and Rhythm: Normal rate and regular rhythm.      Pulses: Normal pulses.      Heart sounds: Normal heart sounds. No murmur heard.  Pulmonary:      Effort: Pulmonary effort is normal.      Breath sounds: Normal breath sounds.     Musculoskeletal:         General: Normal range of motion.      Cervical back: Normal range of motion.     Skin:     General: Skin is warm.     Neurological:      Mental " Status: He is alert and oriented to person, place, and time. Mental status is at baseline.     Psychiatric:         Mood and Affect: Mood normal.         Behavior: Behavior normal.         Thought Content: Thought content normal.

## 2025-07-07 ENCOUNTER — OFFICE VISIT (OUTPATIENT)
Dept: FAMILY MEDICINE CLINIC | Facility: CLINIC | Age: 70
End: 2025-07-07

## 2025-07-07 VITALS
RESPIRATION RATE: 16 BRPM | TEMPERATURE: 97 F | OXYGEN SATURATION: 98 % | DIASTOLIC BLOOD PRESSURE: 88 MMHG | HEIGHT: 68 IN | WEIGHT: 168 LBS | SYSTOLIC BLOOD PRESSURE: 148 MMHG | HEART RATE: 72 BPM | BODY MASS INDEX: 25.46 KG/M2

## 2025-07-07 DIAGNOSIS — E78.5 HYPERLIPIDEMIA, UNSPECIFIED HYPERLIPIDEMIA TYPE: ICD-10-CM

## 2025-07-07 DIAGNOSIS — I10 BENIGN ESSENTIAL HYPERTENSION: ICD-10-CM

## 2025-07-07 DIAGNOSIS — N40.1 BENIGN PROSTATIC HYPERPLASIA WITH URINARY FREQUENCY: ICD-10-CM

## 2025-07-07 DIAGNOSIS — R35.0 BENIGN PROSTATIC HYPERPLASIA WITH URINARY FREQUENCY: ICD-10-CM

## 2025-07-07 DIAGNOSIS — Z79.899 ENCOUNTER FOR MEDICATION REVIEW: ICD-10-CM

## 2025-07-07 RX ORDER — FINASTERIDE 5 MG/1
5 TABLET, FILM COATED ORAL DAILY
Qty: 90 TABLET | Refills: 1 | Status: SHIPPED | OUTPATIENT
Start: 2025-07-07

## 2025-07-07 RX ORDER — TAMSULOSIN HYDROCHLORIDE 0.4 MG/1
0.4 CAPSULE ORAL
Qty: 90 CAPSULE | Refills: 1 | Status: SHIPPED | OUTPATIENT
Start: 2025-07-07

## 2025-07-07 RX ORDER — ATORVASTATIN CALCIUM 20 MG/1
40 TABLET, FILM COATED ORAL DAILY
Qty: 90 TABLET | Refills: 0 | Status: SHIPPED | OUTPATIENT
Start: 2025-07-07

## 2025-07-07 RX ORDER — LISINOPRIL 40 MG/1
40 TABLET ORAL DAILY
Qty: 90 TABLET | Refills: 1 | Status: SHIPPED | OUTPATIENT
Start: 2025-07-07

## 2025-07-07 NOTE — ASSESSMENT & PLAN NOTE
Asymptomatic  Requesting refills    Plan  Tamsulosin 0.4 mg qd with dinner  Orders:    tamsulosin (FLOMAX) 0.4 mg; Take 1 capsule (0.4 mg total) by mouth daily with dinner

## 2025-07-07 NOTE — ASSESSMENT & PLAN NOTE
Elvated today 148/88 mmHg, on manual repeat 145/98 mmHg  He endorses at times forgetting to take ho me meds  Requesting refills  Last CMP wnl (1/3/24)  No evidence of any complications of HTN noted  Home meds:lisinopril    Plan  Cont home meds  Follow up in 3 weeks  Alb/cr urine ratio  BMP  Orders:    lisinopril (ZESTRIL) 40 mg tablet; Take 1 tablet (40 mg total) by mouth daily    Basic metabolic panel; Future    Albumin / creatinine urine ratio; Future